# Patient Record
Sex: FEMALE | Race: BLACK OR AFRICAN AMERICAN | NOT HISPANIC OR LATINO | Employment: FULL TIME | URBAN - METROPOLITAN AREA
[De-identification: names, ages, dates, MRNs, and addresses within clinical notes are randomized per-mention and may not be internally consistent; named-entity substitution may affect disease eponyms.]

---

## 2017-06-27 PROBLEM — O99.282 THYROID DISEASE DURING PREGNANCY IN SECOND TRIMESTER: Chronic | Status: ACTIVE | Noted: 2017-06-27

## 2017-06-27 PROBLEM — E07.9 THYROID DISEASE DURING PREGNANCY IN SECOND TRIMESTER: Chronic | Status: ACTIVE | Noted: 2017-06-27
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
## 2017-08-22 ENCOUNTER — ANESTHESIA EVENT (OUTPATIENT)
Dept: OBSTETRICS AND GYNECOLOGY | Facility: OTHER | Age: 22
End: 2017-08-22
Payer: MEDICAID

## 2017-08-22 ENCOUNTER — ANESTHESIA (OUTPATIENT)
Dept: OBSTETRICS AND GYNECOLOGY | Facility: OTHER | Age: 22
End: 2017-08-22

## 2017-08-22 ENCOUNTER — HOSPITAL ENCOUNTER (INPATIENT)
Facility: OTHER | Age: 22
LOS: 22 days | Discharge: HOME | End: 2017-09-13
Attending: OBSTETRICS & GYNECOLOGY | Admitting: OBSTETRICS & GYNECOLOGY
Payer: MEDICAID

## 2017-08-22 ENCOUNTER — ANESTHESIA (OUTPATIENT)
Dept: OBSTETRICS AND GYNECOLOGY | Facility: OTHER | Age: 22
End: 2017-08-22
Payer: MEDICAID

## 2017-08-22 ENCOUNTER — ANESTHESIA EVENT (OUTPATIENT)
Dept: OBSTETRICS AND GYNECOLOGY | Facility: OTHER | Age: 22
End: 2017-08-22

## 2017-08-22 DIAGNOSIS — Z3A.26 26 WEEKS GESTATION OF PREGNANCY: ICD-10-CM

## 2017-08-22 DIAGNOSIS — O36.5990 PREGNANCY AFFECTED BY FETAL GROWTH RESTRICTION: ICD-10-CM

## 2017-08-22 DIAGNOSIS — O14.12: ICD-10-CM

## 2017-08-22 DIAGNOSIS — R00.0 TACHYCARDIA: ICD-10-CM

## 2017-08-22 DIAGNOSIS — Z3A.25 25 WEEKS GESTATION OF PREGNANCY: ICD-10-CM

## 2017-08-22 DIAGNOSIS — O36.5920: ICD-10-CM

## 2017-08-22 DIAGNOSIS — J45.20 MILD INTERMITTENT ASTHMA WITHOUT COMPLICATION: ICD-10-CM

## 2017-08-22 DIAGNOSIS — O36.5920 INTRAUTERINE GROWTH RESTRICTION AFFECTING ANTEPARTUM CARE OF MOTHER IN SECOND TRIMESTER, NOT APPLICABLE OR UNSPECIFIED FETUS: ICD-10-CM

## 2017-08-22 DIAGNOSIS — O14.10 PRE-ECLAMPSIA, SEVERE, ANTEPARTUM: ICD-10-CM

## 2017-08-22 DIAGNOSIS — O14.12 SEVERE PREECLAMPSIA, SECOND TRIMESTER: Primary | ICD-10-CM

## 2017-08-22 DIAGNOSIS — O14.12 PREECLAMPSIA, SEVERE, SECOND TRIMESTER: ICD-10-CM

## 2017-08-22 DIAGNOSIS — O14.10 PREECLAMPSIA, SEVERE: ICD-10-CM

## 2017-08-22 PROBLEM — O99.282 THYROID DISEASE DURING PREGNANCY IN SECOND TRIMESTER: Chronic | Status: RESOLVED | Noted: 2017-06-27 | Resolved: 2017-08-22

## 2017-08-22 PROBLEM — E05.90 HYPERTHYROIDISM: Status: ACTIVE | Noted: 2017-08-22

## 2017-08-22 PROBLEM — O14.90 PREECLAMPSIA: Status: ACTIVE | Noted: 2017-08-22

## 2017-08-22 PROBLEM — Z86.19 HISTORY OF HEPATITIS C: Status: ACTIVE | Noted: 2017-08-22

## 2017-08-22 PROBLEM — Z86.19 HX OF CHLAMYDIA INFECTION: Status: ACTIVE | Noted: 2017-08-22

## 2017-08-22 PROBLEM — O14.90 PREECLAMPSIA: Status: RESOLVED | Noted: 2017-08-22 | Resolved: 2017-08-22

## 2017-08-22 PROBLEM — E07.9 THYROID DISEASE DURING PREGNANCY IN SECOND TRIMESTER: Chronic | Status: RESOLVED | Noted: 2017-06-27 | Resolved: 2017-08-22

## 2017-08-22 LAB
ABO + RH BLD: NORMAL
ALBUMIN SERPL BCP-MCNC: 3.2 G/DL
ALP SERPL-CCNC: 136 U/L
ALT SERPL W/O P-5'-P-CCNC: 24 U/L
AMPHET+METHAMPHET UR QL: NEGATIVE
AST SERPL-CCNC: 25 U/L
AST SERPL-CCNC: 25 U/L
BACTERIA #/AREA URNS HPF: ABNORMAL /HPF
BARBITURATES UR QL SCN>200 NG/ML: NEGATIVE
BASOPHILS # BLD AUTO: 0.01 K/UL
BASOPHILS NFR BLD: 0 %
BENZODIAZ UR QL SCN>200 NG/ML: NEGATIVE
BILIRUB DIRECT SERPL-MCNC: 0.1 MG/DL
BILIRUB SERPL-MCNC: 0.2 MG/DL
BILIRUB UR QL STRIP: NEGATIVE
BLD GP AB SCN CELLS X3 SERPL QL: NORMAL
BZE UR QL SCN: NEGATIVE
CANNABINOIDS UR QL SCN: NEGATIVE
CLARITY UR: CLEAR
COLOR UR: YELLOW
CREAT SERPL-MCNC: 0.6 MG/DL
CREAT UR-MCNC: 27.6 MG/DL
DIFFERENTIAL METHOD: ABNORMAL
EOSINOPHIL # BLD AUTO: 0 K/UL
EOSINOPHIL NFR BLD: 0 %
ERYTHROCYTE [DISTWIDTH] IN BLOOD BY AUTOMATED COUNT: 12.8 %
EST. GFR  (AFRICAN AMERICAN): >60 ML/MIN/1.73 M^2
EST. GFR  (NON AFRICAN AMERICAN): >60 ML/MIN/1.73 M^2
ETHANOL UR-MCNC: <10 MG/DL
GLUCOSE UR QL STRIP: NEGATIVE
HCT VFR BLD AUTO: 38.3 %
HGB BLD-MCNC: 13.1 G/DL
HGB UR QL STRIP: ABNORMAL
HIV1+2 IGG SERPL QL IA.RAPID: NEGATIVE
HYALINE CASTS #/AREA URNS LPF: 0 /LPF
KETONES UR QL STRIP: ABNORMAL
LEUKOCYTE ESTERASE UR QL STRIP: NEGATIVE
LYMPHOCYTES # BLD AUTO: 0.8 K/UL
LYMPHOCYTES NFR BLD: 3.5 %
MCH RBC QN AUTO: 28.4 PG
MCHC RBC AUTO-ENTMCNC: 34.2 G/DL
MCV RBC AUTO: 83 FL
METHADONE UR QL SCN>300 NG/ML: NEGATIVE
MICROSCOPIC COMMENT: ABNORMAL
MONOCYTES # BLD AUTO: 0.1 K/UL
MONOCYTES NFR BLD: 0.5 %
NEUTROPHILS # BLD AUTO: 21.8 K/UL
NEUTROPHILS NFR BLD: 95.6 %
NITRITE UR QL STRIP: NEGATIVE
OPIATES UR QL SCN: NEGATIVE
PCP UR QL SCN>25 NG/ML: NEGATIVE
PH UR STRIP: 6 [PH] (ref 5–8)
PLATELET # BLD AUTO: 322 K/UL
PMV BLD AUTO: 11.1 FL
PROT SERPL-MCNC: 7.6 G/DL
PROT UR QL STRIP: ABNORMAL
RBC # BLD AUTO: 4.61 M/UL
RBC #/AREA URNS HPF: 20 /HPF (ref 0–4)
SP GR UR STRIP: >=1.03 (ref 1–1.03)
T4 FREE SERPL-MCNC: 1.07 NG/DL
TOXICOLOGY INFORMATION: NORMAL
TSH SERPL DL<=0.005 MIU/L-ACNC: 0.1 UIU/ML
URN SPEC COLLECT METH UR: ABNORMAL
UROBILINOGEN UR STRIP-ACNC: NEGATIVE EU/DL
WBC # BLD AUTO: 22.82 K/UL
WBC #/AREA URNS HPF: 10 /HPF (ref 0–5)

## 2017-08-22 PROCEDURE — 87081 CULTURE SCREEN ONLY: CPT

## 2017-08-22 PROCEDURE — 36415 COLL VENOUS BLD VENIPUNCTURE: CPT

## 2017-08-22 PROCEDURE — 80307 DRUG TEST PRSMV CHEM ANLYZR: CPT

## 2017-08-22 PROCEDURE — 86850 RBC ANTIBODY SCREEN: CPT | Mod: 91

## 2017-08-22 PROCEDURE — 86900 BLOOD TYPING SEROLOGIC ABO: CPT | Mod: 91

## 2017-08-22 PROCEDURE — 99221 1ST HOSP IP/OBS SF/LOW 40: CPT | Mod: ,,, | Performed by: OBSTETRICS & GYNECOLOGY

## 2017-08-22 PROCEDURE — 85025 COMPLETE CBC W/AUTO DIFF WBC: CPT | Mod: 91

## 2017-08-22 PROCEDURE — 25000003 PHARM REV CODE 250: Performed by: OBSTETRICS & GYNECOLOGY

## 2017-08-22 PROCEDURE — 86703 HIV-1/HIV-2 1 RESULT ANTBDY: CPT

## 2017-08-22 PROCEDURE — 99285 EMERGENCY DEPT VISIT HI MDM: CPT

## 2017-08-22 PROCEDURE — 11000001 HC ACUTE MED/SURG PRIVATE ROOM

## 2017-08-22 PROCEDURE — 83520 IMMUNOASSAY QUANT NOS NONAB: CPT

## 2017-08-22 PROCEDURE — 86778 TOXOPLASMA ANTIBODY IGM: CPT

## 2017-08-22 PROCEDURE — 84443 ASSAY THYROID STIM HORMONE: CPT

## 2017-08-22 PROCEDURE — 87591 N.GONORRHOEAE DNA AMP PROB: CPT

## 2017-08-22 PROCEDURE — 25000003 PHARM REV CODE 250: Performed by: STUDENT IN AN ORGANIZED HEALTH CARE EDUCATION/TRAINING PROGRAM

## 2017-08-22 PROCEDURE — 87522 HEPATITIS C REVRS TRNSCRPJ: CPT

## 2017-08-22 PROCEDURE — 80076 HEPATIC FUNCTION PANEL: CPT

## 2017-08-22 PROCEDURE — 82565 ASSAY OF CREATININE: CPT

## 2017-08-22 PROCEDURE — 86777 TOXOPLASMA ANTIBODY: CPT

## 2017-08-22 PROCEDURE — 99284 EMERGENCY DEPT VISIT MOD MDM: CPT | Mod: ,,, | Performed by: OBSTETRICS & GYNECOLOGY

## 2017-08-22 PROCEDURE — 84439 ASSAY OF FREE THYROXINE: CPT

## 2017-08-22 PROCEDURE — 86592 SYPHILIS TEST NON-TREP QUAL: CPT

## 2017-08-22 PROCEDURE — 86803 HEPATITIS C AB TEST: CPT

## 2017-08-22 PROCEDURE — 84445 ASSAY OF TSI GLOBULIN: CPT

## 2017-08-22 PROCEDURE — 81000 URINALYSIS NONAUTO W/SCOPE: CPT

## 2017-08-22 PROCEDURE — 63600175 PHARM REV CODE 636 W HCPCS: Performed by: OBSTETRICS & GYNECOLOGY

## 2017-08-22 PROCEDURE — 86703 HIV-1/HIV-2 1 RESULT ANTBDY: CPT | Mod: 91

## 2017-08-22 RX ORDER — MAGNESIUM SULFATE HEPTAHYDRATE 40 MG/ML
2 INJECTION, SOLUTION INTRAVENOUS CONTINUOUS
Status: DISCONTINUED | OUTPATIENT
Start: 2017-08-22 | End: 2017-08-23

## 2017-08-22 RX ORDER — NIFEDIPINE 10 MG/1
10 CAPSULE ORAL ONCE
Status: COMPLETED | OUTPATIENT
Start: 2017-08-22 | End: 2017-08-22

## 2017-08-22 RX ORDER — ONDANSETRON 8 MG/1
8 TABLET, ORALLY DISINTEGRATING ORAL EVERY 8 HOURS PRN
Status: DISCONTINUED | OUTPATIENT
Start: 2017-08-22 | End: 2017-09-09

## 2017-08-22 RX ORDER — BETAMETHASONE SODIUM PHOSPHATE AND BETAMETHASONE ACETATE 3; 3 MG/ML; MG/ML
12 INJECTION, SUSPENSION INTRA-ARTICULAR; INTRALESIONAL; INTRAMUSCULAR; SOFT TISSUE ONCE
Status: COMPLETED | OUTPATIENT
Start: 2017-08-23 | End: 2017-08-23

## 2017-08-22 RX ORDER — BETAMETHASONE SODIUM PHOSPHATE AND BETAMETHASONE ACETATE 3; 3 MG/ML; MG/ML
12 INJECTION, SUSPENSION INTRA-ARTICULAR; INTRALESIONAL; INTRAMUSCULAR; SOFT TISSUE ONCE
Status: DISCONTINUED | OUTPATIENT
Start: 2017-08-23 | End: 2017-08-22

## 2017-08-22 RX ORDER — NIFEDIPINE 30 MG/1
30 TABLET, EXTENDED RELEASE ORAL DAILY
Status: DISCONTINUED | OUTPATIENT
Start: 2017-08-23 | End: 2017-08-22

## 2017-08-22 RX ORDER — NIFEDIPINE 30 MG/1
30 TABLET, EXTENDED RELEASE ORAL DAILY
Status: DISCONTINUED | OUTPATIENT
Start: 2017-08-23 | End: 2017-08-26

## 2017-08-22 RX ORDER — NIFEDIPINE 30 MG/1
60 TABLET, EXTENDED RELEASE ORAL DAILY
Status: DISCONTINUED | OUTPATIENT
Start: 2017-08-23 | End: 2017-08-22

## 2017-08-22 RX ORDER — CALCIUM GLUCONATE 98 MG/ML
1 INJECTION, SOLUTION INTRAVENOUS
Status: DISCONTINUED | OUTPATIENT
Start: 2017-08-22 | End: 2017-08-23

## 2017-08-22 RX ORDER — SIMETHICONE 80 MG
1 TABLET,CHEWABLE ORAL EVERY 6 HOURS PRN
Status: DISCONTINUED | OUTPATIENT
Start: 2017-08-22 | End: 2017-09-10 | Stop reason: SDUPTHER

## 2017-08-22 RX ORDER — DIPHENHYDRAMINE HYDROCHLORIDE 50 MG/ML
25 INJECTION INTRAMUSCULAR; INTRAVENOUS EVERY 4 HOURS PRN
Status: DISCONTINUED | OUTPATIENT
Start: 2017-08-22 | End: 2017-09-02

## 2017-08-22 RX ORDER — SODIUM CHLORIDE, SODIUM LACTATE, POTASSIUM CHLORIDE, CALCIUM CHLORIDE 600; 310; 30; 20 MG/100ML; MG/100ML; MG/100ML; MG/100ML
1000 INJECTION, SOLUTION INTRAVENOUS CONTINUOUS
Status: DISCONTINUED | OUTPATIENT
Start: 2017-08-22 | End: 2017-08-23

## 2017-08-22 RX ORDER — DIPHENHYDRAMINE HCL 25 MG
25 CAPSULE ORAL EVERY 4 HOURS PRN
Status: DISCONTINUED | OUTPATIENT
Start: 2017-08-22 | End: 2017-09-11

## 2017-08-22 RX ORDER — PRENATAL WITH FERROUS FUM AND FOLIC ACID 3080; 920; 120; 400; 22; 1.84; 3; 20; 10; 1; 12; 200; 27; 25; 2 [IU]/1; [IU]/1; MG/1; [IU]/1; MG/1; MG/1; MG/1; MG/1; MG/1; MG/1; UG/1; MG/1; MG/1; MG/1; MG/1
1 TABLET ORAL DAILY
Status: DISCONTINUED | OUTPATIENT
Start: 2017-08-23 | End: 2017-09-11

## 2017-08-22 RX ORDER — NIFEDIPINE 30 MG/1
30 TABLET, EXTENDED RELEASE ORAL DAILY
Status: DISCONTINUED | OUTPATIENT
Start: 2017-08-22 | End: 2017-08-22

## 2017-08-22 RX ADMIN — NIFEDIPINE 10 MG: 10 CAPSULE, LIQUID FILLED ORAL at 09:08

## 2017-08-22 RX ADMIN — NIFEDIPINE 30 MG: 30 TABLET, FILM COATED, EXTENDED RELEASE ORAL at 05:08

## 2017-08-22 RX ADMIN — MAGNESIUM SULFATE IN WATER 2 G/HR: 40 INJECTION, SOLUTION INTRAVENOUS at 05:08

## 2017-08-22 RX ADMIN — NIFEDIPINE 10 MG: 10 CAPSULE, LIQUID FILLED ORAL at 08:08

## 2017-08-22 RX ADMIN — SODIUM CHLORIDE, POTASSIUM CHLORIDE, SODIUM LACTATE AND CALCIUM CHLORIDE 1000 ML: 600; 310; 30; 20 INJECTION, SOLUTION INTRAVENOUS at 05:08

## 2017-08-22 NOTE — PROGRESS NOTES
H and P pending.  In brief, I saw this patient in the OB ED who has new onset preeclampsia along with IUGR, fetal echogenic bowel and possible VSD.  The patient was started on magnesium sulfate and given a dose of IV hydralazine and betamethasone and transferred. P/C ratio was positive.  LFTs, creatinine, and platelets were normal.  EFW 379g.  I discussed with the patient the poor prognosis for the fetus at this gestational age given the EFW. The patient is aware that it is likely if this EFW is true that intubation of the fetus may not be possible. I discussed with her before resuscitation would even be considered I would recommend at least having the steroids on board before considering something such as a csection for distress.  I reviewed with her that a classical csection has consequences for future pregnancies and that a csection would always be needed in future pregnancies and discussed the risks of abnormal placentation.  The patient reports one prior SAB.  Maternal and fetal risks of morbidity and mortality with preeclampsia were reviewed. We will start Procardia XL 30mg daily in an event to temporize the patient's blood pressure to try to allow the fetus to get bigger. The patient is aware of the potential risks of conservative management and desires this but is also aware that delivery may be recommended at anytime. Recheck labs this evening. Continue magnesium.  With AEDF and IUGR, risks of IUFD and  demise were reviewed.  Check thyroid labs and hepatitis labs and HIV and tox screen.  Echogenic bowel labs are pending.  Please also see Dr. Trejo's note.  I spoke to Dr. Hartmann who indicated one of their team will see the patient tomorrow unless delivery is needed tonight for some reason then she will see the patient prior to delivery.  Patient agrees to doptones bid and no fetal monitoring currently.  If patient with low glucose IVF may need altered.  Patient with asthma-chose procardia XL  instead-reviewed theoretical risk of hypotension and magnesium.  Monitor pulse-ekg if signifcant tachycardia persists. BP 150s over 80s when I was in the room-had one 160 in room prior to my arrival.

## 2017-08-22 NOTE — ASSESSMENT & PLAN NOTE
Pre-Eclampsia w/ Severe Features (BP) at 25w0d complicated by FGR with AEDF  - Admit to Antepartum unit  - Consents signed for Delivery and Blood   - Pre-E Labs - P/C: 5.44, AST: 21, Plt:240, Cr:0.5  - Continue MgSO4 for seizure prophylaxis - s/p 4g load, continue at 2g per hour. Will closely monitor for UOP and for signs of MgSO4 toxicity.   - BPs - s/p hydralazine 5mg IV at OSH  - BP: (142-173)/() 155/83 . Will continue to monitor BPs.   - Continue BMZ series  - Repeat labs at 2000

## 2017-08-22 NOTE — HOSPITAL COURSE
08/22/2017 - admitted to antepartum at 25w0d for newly diagnosed PreE w/SF in a pregnancy complicated by FGR (AEDF). BMZ series and Mag started at OSH. BP remained elevated, started on Procardia 30XL.  08/23/2017 - Doing well from PreE standpoint, asymptomatic, labs stable, BP now mild range.  08/24/2017 -8/27 - Continues to be stable from PrE standpoint. Platelets stable, AST stable. Continue FHR dopplers q shift.  HD7-14: BP regimen changed to Procardia XL 60AM/30PM due to elevated PM BP. Labs stable. Umbilical artery flow remains absent on doppler. Ob glucose screen 169. Will order 3hour GTT this week.   HD15-16: Growth scan 420 g, <1%ile. Continue NST BID. Rescue course of BMZ completed.  HD17: BP to 150 overnight, Procardia given early.  HD18: BP in mild range intermittently overnight.  No acute issues.   HD19: NST with new onset variable decelerations. REDF on U/S. CCS 2/2 REDF/NRFHT - uncomplicated.   HD 20-22: POD #2-4 s/p LTCS. Patient doing well with no complaints. Denies SF. Procardia decreased to 30 XL daily with BP remaining in normal to mild range. Patient stable for discharge on HD#22/POD#4.

## 2017-08-22 NOTE — ED PROVIDER NOTES
Encounter Date: 2017       History     Chief Complaint   Patient presents with    Hypertension     Myrtle Valdez is a 22 y.o. [id number] at 25w0d [place] a [place] secondary to PreE w/SF and FGR with AEDF.     This IUP is complicated by newly diagnosed PreE w/SF (severe range BP), fetal growth restriction (1%, AEDF, fetal hyperechoic bowel and possible VSD), asthma (well controlled, no meds), hx hyperthyroidism (no meds), questionable hx of HepC, hx of chlamydia.     Currently patient has no complaints. Patient denies headache, scotoma, RUQ pain, N/V, CP, SOB. Patient denies contractions, vaginal bleeding or discharge, denies LOF.   Fetal Movement: normal.     Pt was [place] by Dr. Trejo and accepted by Dr. Mcdermott here.  Prenatal care with Dr. Elier Sanchez there.           Review of patient's allergies indicates:  No Known Allergies  Past Medical History:   Diagnosis Date    Asthma     Hypertension     elevated BP 17    Thyroid disease     hyperthyroid     Past Surgical History:   Procedure Laterality Date    DILATION AND CURETTAGE OF UTERUS      HIP SURGERY      TYMPANOPLASTY Left 6/10/2014    TYMPANOSTOMY TUBE PLACEMENT       Family History   Problem Relation Age of Onset    Diabetes Mother     Hypertension Mother     Cancer Maternal Grandmother     Diabetes Maternal Grandmother     Heart disease Maternal Grandmother     Hypertension Maternal Grandmother     Asthma Maternal Grandfather     Diabetes Maternal Grandfather      Social History   Substance Use Topics    Smoking status: Never Smoker    Smokeless tobacco: Never Used    Alcohol use No     Review of Systems   Constitutional: Negative for appetite change, chills and fever.   HENT: Negative for congestion and sore throat.    Respiratory: Negative for chest tightness, shortness of breath and wheezing.    Cardiovascular: Negative for chest pain and leg swelling.    Gastrointestinal: Negative for abdominal pain, constipation, diarrhea, nausea and vomiting.   Genitourinary: Negative for dysuria, frequency, vaginal bleeding, vaginal discharge and vaginal pain.   Musculoskeletal: Negative for back pain.   Neurological: Negative for dizziness and headaches.   Psychiatric/Behavioral: Negative for agitation, behavioral problems and confusion.       Physical Exam     Initial Vitals [08/22/17 1615]   BP Pulse Resp Temp SpO2   (!) 157/89 100 -- 98 °F (36.7 °C) 100 %      MAP       111.67         Physical Exam    Vitals reviewed.  Constitutional: She appears well-developed and well-nourished. No distress.   HENT:   Head: Normocephalic and atraumatic.   Eyes: EOM are normal.   Neck: Normal range of motion. Neck supple.   Cardiovascular: Normal rate and regular rhythm.   Pulmonary/Chest: No respiratory distress.   Abdominal: Soft. There is no tenderness. There is no rebound and no guarding.   Gravid fundus soft and nontender, appropriate for gestational age   Genitourinary:   Genitourinary Comments: deferred   Musculoskeletal: Normal range of motion. She exhibits no edema or tenderness.   Neurological: She is alert and oriented to person, place, and time. She has normal reflexes. No cranial nerve deficit.   Skin: Skin is warm and dry. No rash and no abscess noted. No erythema. No pallor.   Psychiatric: She has a normal mood and affect. Her behavior is normal. Judgment and thought content normal.     OB LABOR EXAM:       Method: Sterile vaginal exam per MD.   Vaginal Bleeding: none present.     Dilatation: 0.   Station: -4.   Effacement: 40%.   Amniotic Fluid Color: no fluid.     Comments: FHT: verified at 140s       ED Course   Procedures  Labs Reviewed   STREP B SCREEN, VAGINAL / RECTAL   C. TRACHOMATIS/N. GONORRHOEAE BY AMP DNA   AST (SGOT)   CBC W/ AUTO DIFFERENTIAL   HEPATITIS C RNA, QUANTITATIVE, PCR   CREATININE, SERUM   HEPATIC FUNCTION PANEL   HEPATITIS C ANTIBODY   HIV 1 / 2  ANTIBODY   RAPID HIV   RPR   T4, FREE   THYROID STIMULATING IMMUNOGLOBULIN   THYROTROPIN RECEPTOR ANTIBODY   TOXICOLOGY SCREEN, URINE, RANDOM (COMPLIANCE)   TOXOPLASMA GONDII ANTIBODY, IGM   TOXOPLASMA GONDII IGG   TSH   TYPE & SCREEN                          Attending Attestation:   Physician Attestation Statement for Resident:  As the supervising MD   Physician Attestation Statement: I have personally seen and examined this patient.   I agree with the above history. -:   As the supervising MD I agree with the above PE.    As the supervising MD I agree with the above treatment, course, plan, and disposition.   -: Patient evaluated and found to be stable, agree with resident's assessment and plan for admission to antepartum under MFM direction.   I was personally present during the critical portions of the procedure(s) performed by the resident and was immediately available in the ED to provide services and assistance as needed during the entire procedure.  I have reviewed and agree with the residents interpretation of the following: lab data.  I have reviewed the following: old records at this facility and records from a referring facility.                    ED Course   Comment By Time    by Doppler  Ms. Valdez is a 22 year old female  with prenatal care at [facility], transferred at 25 weeks gestation by Dr. Trejo today with severe preeclampsia, fetal growth restriction and absent end diastolic flow.  Pt is to be evaluated for admission either for delivery or to the antepartum floor. Pt denies headaches and denies any pain. Pt is just scared and crying. Reassured the pt that [facility] will be supervising her care throughout and that we are all participating to ensure the best care possible. Pt satisfied and senior resident present to proceed with consents for admission/delivery and initiating plan of care.  Minal Marroquin MD  [phone]     Clinical Impression:   The primary encounter diagnosis  was 25 weeks gestation of pregnancy. Diagnoses of Preeclampsia, severe and Pregnancy affected by fetal growth restriction were also pertinent to this visit.    Disposition:   Disposition: Admitted  Condition: Stable  Admit to high risk ante for BMZ, Mag, close monitoring and NICU/MFM consults           Kaity Nascimento M.D.  PGY-4 OB/GYN         Kaity Nascimento MD  Resident  08/22/17 0901       Minal Marroquin MD  08/22/17 [id number]

## 2017-08-22 NOTE — HPI
Myrtle Valdez is a 22 y.o. [id number] at 25w0d presents as a transport from [facility] secondary to PreE w/SF and FGR with AEDF.    This IUP is complicated by newly diagnosed PreE w/SF (severe range BP), fetal growth restriction (1%, AEDF, fetal hyperechoic bowel and possible VSD), asthma (well controlled, no meds), hx hyperthyroidism (no meds), questionable hx of HepC, hx of chlamydia.    Currently patient has no complaints. Patient denies headache, scotoma, RUQ pain, N/V, CP, SOB. Patient denies contractions, vaginal bleeding or discharge, denies LOF.   Fetal Movement: normal.

## 2017-08-22 NOTE — H&P
[facility]  Obstetrics  History & Physical    Patient Name: Myrtle Valdez  MRN: [id number]  Admission Date: 2017  Primary Care Provider: Primary Doctor No    Subjective:     Principal Problem:Pre-eclampsia, severe, antepartum    History of Present Illness:  Myrtle Valdez is a 22 y.o. [id number] at 25w0d presents as a transport from [facility] secondary to PreE w/SF and FGR with AEDF.    This IUP is complicated by newly diagnosed PreE w/SF (severe range BP), fetal growth restriction (1%, AEDF, fetal hyperechoic bowel and possible VSD), asthma (well controlled, no meds), hx hyperthyroidism (no meds), questionable hx of HepC, hx of chlamydia.    Currently patient has no complaints. Patient denies headache, scotoma, RUQ pain, N/V, CP, SOB. Patient denies contractions, vaginal bleeding or discharge, denies LOF.   Fetal Movement: normal.       Obstetric History       T0      L0     SAB0   TAB0   Ectopic0   Multiple0   Live Births0       # Outcome Date GA Lbr Jos/2nd Weight Sex Delivery Anes PTL Lv   2 Current            1 AB  12w0d               Past Medical History:   Diagnosis Date    Asthma     Hypertension     elevated BP 17    Thyroid disease     hyperthyroid     Past Surgical History:   Procedure Laterality Date    DILATION AND CURETTAGE OF UTERUS      HIP SURGERY      TYMPANOPLASTY Left 6/10/2014    TYMPANOSTOMY TUBE PLACEMENT         PTA Medications   Medication Sig    neomycin-polymyxin-hydrocortisone (CORTISPORIN) 3.5-10,000-1 mg/mL-unit/mL-% otic suspension Place 4 drops into the left ear 3 (three) times daily.    PRENATAL VIT CALC,IRON,FOLIC (PRENATAL VITAMIN ORAL) Take by mouth.    desonide 0.05% (DESOWEN) 0.05 % Oint Apply topically 2 (two) times daily. May use on rash 24 hours after washing off elimite. Use for 2 weeks       Review of patient's allergies indicates:  No Known Allergies     Family History     Problem Relation (Age of Onset)     Asthma Maternal Grandfather    Cancer Maternal Grandmother    Diabetes Mother, Maternal Grandmother, Maternal Grandfather    Heart disease Maternal Grandmother    Hypertension Mother, Maternal Grandmother        Social History Main Topics    Smoking status: Never Smoker    Smokeless tobacco: Never Used    Alcohol use No    Drug use: No    Sexual activity: No     Review of Systems   Constitutional: Negative for activity change, appetite change, fatigue and fever.   Respiratory: Negative for cough and shortness of breath.    Cardiovascular: Negative for chest pain and palpitations.   Gastrointestinal: Negative for abdominal pain, constipation, diarrhea, nausea and vomiting.   Genitourinary: Negative for dysuria, frequency, pelvic pain, vaginal bleeding and vaginal discharge.   Neurological: Negative for headaches.   Psychiatric/Behavioral: Negative for depression. The patient is not nervous/anxious.    Breast: Negative for breast mass and breast pain     Objective:     Vital Signs (Most Recent):  Temp: 98 °F (36.7 °C) (08/22/17 1615)  Pulse: 97 (08/22/17 1630)  BP: (!) 155/83 (08/22/17 1630)  SpO2: 100 % (08/22/17 1630) Vital Signs (24h Range):  Temp:  [97 °F (36.1 °C)-98 °F (36.7 °C)] 98 °F (36.7 °C)  Pulse:  [] 97  Resp:  [16] 16  SpO2:  [100 %] 100 %  BP: (142-173)/() 155/83      FHT: verified at 140s    Physical Exam:   Constitutional: She is oriented to person, place, and time. She appears well-developed and well-nourished.    HENT:   Head: Normocephalic and atraumatic.     Neck: Normal range of motion.    Cardiovascular: Normal rate, regular rhythm and normal heart sounds.     Pulmonary/Chest: Effort normal and breath sounds normal. No respiratory distress. She has no wheezes.        Abdominal: Soft. Bowel sounds are normal. She exhibits no distension and no abdominal incision. There is no tenderness.   Gravid 25w     Genitourinary:   Genitourinary Comments: Normal external genitalia. Cervix  appears normal. White discharge present. Cultures collected.           Musculoskeletal: Normal range of motion and moves all extremeties. She exhibits no edema or tenderness.       Neurological: She is alert and oriented to person, place, and time. She has normal reflexes.    Skin: Skin is warm and dry.    Psychiatric: She has a normal mood and affect.       Cervix:  Dilation:  0  Effacement:  0%  Station: -3  Presentation: Vertex     Significant Labs:  Lab Results   Component Value Date    GROUPTRH B POS 2017       Chemistries:     Recent Labs  Lab 17  1151      K 3.8      CO2 21*   BUN 7   CREATININE 0.50*   CALCIUM 9.2   PROT 6.4   BILITOT 0.4   ALKPHOS 90   ALT 33   AST 21        CBC/Anemia Labs:     Recent Labs  Lab 17  1151   WBC 17.90*   HGB 11.7*   HCT 35.8*      MCV 85   RDW 13.3            Assessment/Plan:     22 y.o. female  at 25w0d for:    * Pre-eclampsia, severe, antepartum    Pre-Eclampsia w/ Severe Features (BP) at 25w0d complicated by FGR with AEDF  - Admit to Antepartum unit  - Consents signed for Delivery and Blood   - Pre-E Labs - P/C: 5.44, AST: 21, Plt:240, Cr:0.5  - Continue MgSO4 for seizure prophylaxis - s/p 4g load, continue at 2g per hour. Will closely monitor for UOP and for signs of MgSO4 toxicity.   - BPs - s/p hydralazine 5mg IV at OSH  - BP: (142-173)/() 155/83 . Will continue to monitor BPs.   - Continue BMZ series  - Repeat labs at         Intrauterine growth restriction affecting antepartum care of mother in second trimester    - AEDF, EFW 379g, 1% on   - Fetus also has hyperechoic fetal bowel and possible VSD  - Work Up: CMV, parvo, toxoplasmosis, Urine tox  - Verify FHT daily        25 weeks gestation of pregnancy    - PNV  - verify FHT daily        History of hepatitis C    - Hep C Ab and viral load ordered  - hepatic function panel        Hyperthyroidism    - no meds  - TSH, FT4, thyroid antibodies ordered        Hx of  chlamydia infection    - Gc/CT collected        Asthma    - well controlled, no meds  - continue to monitor            Kaity Nascimento MD  Obstetrics  [facility]

## 2017-08-22 NOTE — PROGRESS NOTES
H and P pending.  In brief, I saw this patient in the OB ED who has new onset preeclampsia along with IUGR, fetal echogenic bowel and possible VSD.  The patient was started on magnesium sulfate and given a dose of IV hydralazine and betamethasone and transferred. P/C ratio was positive.  LFTs, creatinine, and platelets were normal.  EFW 379g.  I discussed with the patient the poor prognosis for the fetus at this gestational age given the EFW. The patient is aware that it is likely if this EFW is true that intubation of the fetus may not be possible. I discussed with her before resuscitation would even be considered I would recommend at least having the steroids on board before considering something such as a csection for distress.  I reviewed with her that a classical csection has consequences for future pregnancies and that a csection would always be needed in future pregnancies and discussed the risks of abnormal placentation.  The patient reports one prior SAB.  Maternal and fetal risks of morbidity and mortality with preeclampsia were reviewed. We will start Procardia XL 30mg daily in an event to temporize the patient's blood pressure to try to allow the fetus to get bigger. The patient is aware of the potential risks of conservative management and desires this but is also aware that delivery may be recommended at anytime. Recheck labs this evening. Continue magnesium.  With AEDF and IUGR, risks of IUFD and  demise were reviewed.  Check thyroid labs and hepatitis labs and HIV and tox screen.  Echogenic bowel labs are pending.  Please also see Dr. Trejo's note.  I spoke to Dr. Hartmann who indicated one of their team will see the patient tomorrow unless delivery is needed tonight for some reason then she will see the patient prior to delivery.  Patient agrees to doptones bid and no fetal monitoring currently.  If patient with low glucose IVF may need altered.
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
## 2017-08-22 NOTE — SUBJECTIVE & OBJECTIVE
Obstetric History       T0      L0     SAB0   TAB0   Ectopic0   Multiple0   Live Births0       # Outcome Date GA Lbr Jos/2nd Weight Sex Delivery Anes PTL Lv   2 Current            1 AB  12w0d               Past Medical History:   Diagnosis Date    Asthma     Hypertension     elevated BP 17    Thyroid disease     hyperthyroid     Past Surgical History:   Procedure Laterality Date    DILATION AND CURETTAGE OF UTERUS      HIP SURGERY      TYMPANOPLASTY Left 6/10/2014    TYMPANOSTOMY TUBE PLACEMENT         PTA Medications   Medication Sig    neomycin-polymyxin-hydrocortisone (CORTISPORIN) 3.5-10,000-1 mg/mL-unit/mL-% otic suspension Place 4 drops into the left ear 3 (three) times daily.    PRENATAL VIT CALC,IRON,FOLIC (PRENATAL VITAMIN ORAL) Take by mouth.    desonide 0.05% (DESOWEN) 0.05 % Oint Apply topically 2 (two) times daily. May use on rash 24 hours after washing off elimite. Use for 2 weeks       Review of patient's allergies indicates:  No Known Allergies     Family History     Problem Relation (Age of Onset)    Asthma Maternal Grandfather    Cancer Maternal Grandmother    Diabetes Mother, Maternal Grandmother, Maternal Grandfather    Heart disease Maternal Grandmother    Hypertension Mother, Maternal Grandmother        Social History Main Topics    Smoking status: Never Smoker    Smokeless tobacco: Never Used    Alcohol use No    Drug use: No    Sexual activity: No     Review of Systems   Constitutional: Negative for activity change, appetite change, fatigue and fever.   Respiratory: Negative for cough and shortness of breath.    Cardiovascular: Negative for chest pain and palpitations.   Gastrointestinal: Negative for abdominal pain, constipation, diarrhea, nausea and vomiting.   Genitourinary: Negative for dysuria, frequency, pelvic pain, vaginal bleeding and vaginal discharge.   Neurological: Negative for headaches.   Psychiatric/Behavioral: Negative for  depression. The patient is not nervous/anxious.    Breast: Negative for breast mass and breast pain     Objective:     Vital Signs (Most Recent):  Temp: 98 °F (36.7 °C) (08/22/17 1615)  Pulse: 97 (08/22/17 1630)  BP: (!) 155/83 (08/22/17 1630)  SpO2: 100 % (08/22/17 1630) Vital Signs (24h Range):  Temp:  [97 °F (36.1 °C)-98 °F (36.7 °C)] 98 °F (36.7 °C)  Pulse:  [] 97  Resp:  [16] 16  SpO2:  [100 %] 100 %  BP: (142-173)/() 155/83      FHT: verified at 140s    Physical Exam:   Constitutional: She is oriented to person, place, and time. She appears well-developed and well-nourished.    HENT:   Head: Normocephalic and atraumatic.     Neck: Normal range of motion.    Cardiovascular: Normal rate, regular rhythm and normal heart sounds.     Pulmonary/Chest: Effort normal and breath sounds normal. No respiratory distress. She has no wheezes.        Abdominal: Soft. Bowel sounds are normal. She exhibits no distension and no abdominal incision. There is no tenderness.   Gravid 25w     Genitourinary:   Genitourinary Comments: Normal external genitalia. Cervix appears normal. White discharge present. Cultures collected.           Musculoskeletal: Normal range of motion and moves all extremeties. She exhibits no edema or tenderness.       Neurological: She is alert and oriented to person, place, and time. She has normal reflexes.    Skin: Skin is warm and dry.    Psychiatric: She has a normal mood and affect.       Cervix:  Dilation:  0  Effacement:  0%  Station: -3  Presentation: Vertex     Significant Labs:  Lab Results   Component Value Date    GROUPTRH B POS 08/22/2017       Chemistries:     Recent Labs  Lab 08/22/17  1151      K 3.8      CO2 21*   BUN 7   CREATININE 0.50*   CALCIUM 9.2   PROT 6.4   BILITOT 0.4   ALKPHOS 90   ALT 33   AST 21        CBC/Anemia Labs:     Recent Labs  Lab 08/22/17  1151   WBC 17.90*   HGB 11.7*   HCT 35.8*      MCV 85   RDW 13.3

## 2017-08-22 NOTE — ASSESSMENT & PLAN NOTE
- AEDF, EFW 379g, 1% on 8/22  - Fetus also has hyperechoic fetal bowel and possible VSD  - Work Up: CMV, parvo, toxoplasmosis, Urine tox  - Verify FHT daily

## 2017-08-23 PROBLEM — R00.0 TACHYCARDIA: Status: ACTIVE | Noted: 2017-08-23

## 2017-08-23 LAB
C TRACH DNA SPEC QL NAA+PROBE: NOT DETECTED
HCV AB SERPL QL IA: NEGATIVE
HIV 1+2 AB+HIV1 P24 AG SERPL QL IA: NEGATIVE
N GONORRHOEA DNA SPEC QL NAA+PROBE: NOT DETECTED
RPR SER QL: NORMAL
T3FREE SERPL-MCNC: 3.1 PG/ML

## 2017-08-23 PROCEDURE — 99231 SBSQ HOSP IP/OBS SF/LOW 25: CPT | Mod: ,,, | Performed by: OBSTETRICS & GYNECOLOGY

## 2017-08-23 PROCEDURE — 99232 SBSQ HOSP IP/OBS MODERATE 35: CPT | Mod: ,,, | Performed by: PEDIATRICS

## 2017-08-23 PROCEDURE — 84481 FREE ASSAY (FT-3): CPT

## 2017-08-23 PROCEDURE — 76827 ECHO EXAM OF FETAL HEART: CPT | Performed by: PEDIATRICS

## 2017-08-23 PROCEDURE — 87086 URINE CULTURE/COLONY COUNT: CPT

## 2017-08-23 PROCEDURE — 87147 CULTURE TYPE IMMUNOLOGIC: CPT

## 2017-08-23 PROCEDURE — 76825 ECHO EXAM OF FETAL HEART: CPT | Performed by: PEDIATRICS

## 2017-08-23 PROCEDURE — 96372 THER/PROPH/DIAG INJ SC/IM: CPT

## 2017-08-23 PROCEDURE — 93325 DOPPLER ECHO COLOR FLOW MAPG: CPT | Performed by: PEDIATRICS

## 2017-08-23 PROCEDURE — 25000003 PHARM REV CODE 250: Performed by: OBSTETRICS & GYNECOLOGY

## 2017-08-23 PROCEDURE — 87088 URINE BACTERIA CULTURE: CPT

## 2017-08-23 PROCEDURE — 25000003 PHARM REV CODE 250: Performed by: STUDENT IN AN ORGANIZED HEALTH CARE EDUCATION/TRAINING PROGRAM

## 2017-08-23 PROCEDURE — 93010 ELECTROCARDIOGRAM REPORT: CPT | Mod: ,,, | Performed by: INTERNAL MEDICINE

## 2017-08-23 PROCEDURE — 63600175 PHARM REV CODE 636 W HCPCS: Performed by: OBSTETRICS & GYNECOLOGY

## 2017-08-23 PROCEDURE — 11000001 HC ACUTE MED/SURG PRIVATE ROOM

## 2017-08-23 PROCEDURE — 93005 ELECTROCARDIOGRAM TRACING: CPT

## 2017-08-23 PROCEDURE — 99900035 HC TECH TIME PER 15 MIN (STAT)

## 2017-08-23 PROCEDURE — 36415 COLL VENOUS BLD VENIPUNCTURE: CPT

## 2017-08-23 RX ORDER — MAGNESIUM SULFATE HEPTAHYDRATE 40 MG/ML
2 INJECTION, SOLUTION INTRAVENOUS CONTINUOUS
Status: DISCONTINUED | OUTPATIENT
Start: 2017-08-23 | End: 2017-08-23

## 2017-08-23 RX ORDER — ACETAMINOPHEN 325 MG/1
650 TABLET ORAL EVERY 6 HOURS PRN
Status: DISCONTINUED | OUTPATIENT
Start: 2017-08-23 | End: 2017-09-09

## 2017-08-23 RX ORDER — SODIUM CHLORIDE, SODIUM LACTATE, POTASSIUM CHLORIDE, CALCIUM CHLORIDE 600; 310; 30; 20 MG/100ML; MG/100ML; MG/100ML; MG/100ML
1000 INJECTION, SOLUTION INTRAVENOUS CONTINUOUS
Status: DISCONTINUED | OUTPATIENT
Start: 2017-08-23 | End: 2017-08-23

## 2017-08-23 RX ORDER — SODIUM CHLORIDE, SODIUM LACTATE, POTASSIUM CHLORIDE, CALCIUM CHLORIDE 600; 310; 30; 20 MG/100ML; MG/100ML; MG/100ML; MG/100ML
INJECTION, SOLUTION INTRAVENOUS CONTINUOUS
Status: DISCONTINUED | OUTPATIENT
Start: 2017-08-23 | End: 2017-08-23

## 2017-08-23 RX ORDER — CALCIUM GLUCONATE 98 MG/ML
1 INJECTION, SOLUTION INTRAVENOUS
Status: DISCONTINUED | OUTPATIENT
Start: 2017-08-23 | End: 2017-08-23

## 2017-08-23 RX ADMIN — ACETAMINOPHEN 650 MG: 325 TABLET ORAL at 11:08

## 2017-08-23 RX ADMIN — NIFEDIPINE 30 MG: 30 TABLET, FILM COATED, EXTENDED RELEASE ORAL at 09:08

## 2017-08-23 RX ADMIN — BETAMETHASONE SODIUM PHOSPHATE AND BETAMETHASONE ACETATE 12 MG: 3; 3 INJECTION, SUSPENSION INTRA-ARTICULAR; INTRALESIONAL; INTRAMUSCULAR at 12:08

## 2017-08-23 RX ADMIN — MAGNESIUM SULFATE IN WATER 2 G/HR: 40 INJECTION, SOLUTION INTRAVENOUS at 07:08

## 2017-08-23 RX ADMIN — PRENATAL VIT W/ FE FUMARATE-FA TAB 27-0.8 MG 1 EACH: 27-0.8 TAB at 09:08

## 2017-08-23 NOTE — MEDICAL/APP STUDENT
Magnesium Assessment Note    Subjective:         Myrtle Valedz is a 22 y.o. female at 25w0d on IV MgSO4 for seizure prophylaxis.    Patient denies headaches, denies blurry vision and denies right upper quadrant pain. Denies CP, denies SOB. Patient reports no nausea/no vomiting.     Objective:      Temp:  [97 °F (36.1 °C)-98 °F (36.7 °C)] 98 °F (36.7 °C)  Pulse:  [] 123  Resp:  [16] 16  SpO2:  [99 %-100 %] 100 %  BP: (142-188)/() 172/92    I/O last 3 completed shifts:  In: 100 [I.V.:100]  Out: 550 [Urine:550]  No intake/output data recorded.    General:   alert, cooperative and no distress   Lungs:   clear to auscultation bilaterally   Heart::   regular rate and rhythm and S1, S2 normal   Extremities: peripheral pulses normal, no pedal edema, no clubbing or cyanosis   DTRs- 2+  bilaterally     NST: reassuring, Category 1, 130 bpm, moderate BTBV  TOCO: no contractions      Lab Review  Recent Results (from the past 24 hour(s))   Type & Screen    Collection Time: 08/22/17 11:45 AM   Result Value Ref Range    Group & Rh B POS     Indirect Joao GEL NEG    CBC with Auto Differential    Collection Time: 08/22/17 11:51 AM   Result Value Ref Range    WBC 17.90 (H) 3.90 - 12.70 K/uL    RBC 4.22 4.00 - 5.40 M/uL    Hemoglobin 11.7 (L) 12.0 - 16.0 g/dL    Hematocrit 35.8 (L) 37.0 - 48.5 %    MCV 85 82 - 98 fL    MCH 27.7 27.0 - 31.0 pg    MCHC 32.7 32.0 - 36.0 g/dL    RDW 13.3 11.5 - 14.5 %    Platelets 240 150 - 350 K/uL    MPV 9.8 9.2 - 12.9 fL    Gran # 15.1 (H) 1.8 - 7.7 K/uL    Lymph # 1.5 1.0 - 4.8 K/uL    Mono # 1.0 0.3 - 1.0 K/uL    Eos # 0.2 0.0 - 0.5 K/uL    Baso # 0.10 0.00 - 0.20 K/uL    nRBC 0 0 /100 WBC    Gran% 84.6 (H) 38.0 - 73.0 %    Lymph% 8.1 (L) 18.0 - 48.0 %    Mono% 5.5 4.0 - 15.0 %    Eosinophil% 1.2 0.0 - 8.0 %    Basophil% 0.6 0.0 - 1.9 %    Differential Method Automated    Comprehensive Metabolic Panel (CMP)    Collection Time: 08/22/17 11:51 AM   Result Value Ref Range    Sodium 138  136 - 145 mmol/L    Potassium 3.8 3.5 - 5.1 mmol/L    Chloride 109 95 - 110 mmol/L    CO2 21 (L) 23 - 29 mmol/L    Glucose 71 (L) 74 - 106 mg/dL    BUN, Bld 7 7 - 17 mg/dL    Creatinine 0.50 (L) 0.70 - 1.20 mg/dL    Calcium 9.2 8.4 - 10.2 mg/dL    Total Protein 6.4 6.3 - 8.2 g/dL    Albumin 3.6 3.5 - 5.2 g/dL    Total Bilirubin 0.4 0.2 - 1.3 mg/dL    Alkaline Phosphatase 90 38 - 145 U/L    AST 21 14 - 36 U/L    ALT 33 10 - 44 U/L    eGFR if African American >60 >60 mL/min/1.73 m^2    eGFR if non African American >60 >60 mL/min/1.73 m^2   Protein / creatinine ratio, urine    Collection Time: 08/22/17 12:10 PM   Result Value Ref Range    Protein, Urine Random 184 mg/dL    Creatinine, Random Ur 33.8 mg/dL    Prot/Creat Ratio, Ur 5.44 (H) <0.20 mg/g   Toxicology screen, urine    Collection Time: 08/22/17  4:40 PM   Result Value Ref Range    Alcohol, Urine <10 <10 mg/dL    Benzodiazepines Negative     Methadone metabolites Negative     Cocaine (Metab.) Negative     Opiate Scrn, Ur Negative     Barbiturate Screen, Ur Negative     Amphetamine Screen, Ur Negative     THC Negative     Phencyclidine Negative     Creatinine, Random Ur 27.6 15.0 - 325.0 mg/dL    Toxicology Information SEE COMMENT    Urinalysis    Collection Time: 08/22/17  4:41 PM   Result Value Ref Range    Specimen UA Urine, Clean Catch     Color, UA Yellow Yellow, Straw, [place]    Appearance, UA Clear Clear    pH, UA 6.0 5.0 - 8.0    Specific Gravity, UA >=1.030 (A) 1.005 - 1.030    Protein, UA 2+ (A) Negative    Glucose, UA Negative Negative    Ketones, UA 2+ (A) Negative    Bilirubin (UA) Negative Negative    Occult Blood UA 1+ (A) Negative    Nitrite, UA Negative Negative    Urobilinogen, UA Negative <2.0 EU/dL    Leukocytes, UA Negative Negative   Urinalysis Microscopic    Collection Time: 08/22/17  4:41 PM   Result Value Ref Range    RBC, UA 20 (H) 0 - 4 /hpf    WBC, UA 10 (H) 0 - 5 /hpf    Bacteria, UA FEW None-Occ /hpf    Hyaline Casts, UA 0 0-1/lpf  /lpf    Microscopic Comment SEE COMMENT    AST (SGOT)    Collection Time: 17  7:30 PM   Result Value Ref Range    AST 25 10 - 40 U/L   CBC auto differential    Collection Time: 17  7:30 PM   Result Value Ref Range    WBC 22.82 (H) 3.90 - 12.70 K/uL    RBC 4.61 4.00 - 5.40 M/uL    Hemoglobin 13.1 12.0 - 16.0 g/dL    Hematocrit 38.3 37.0 - 48.5 %    MCV 83 82 - 98 fL    MCH 28.4 27.0 - 31.0 pg    MCHC 34.2 32.0 - 36.0 g/dL    RDW 12.8 11.5 - 14.5 %    Platelets 322 150 - 350 K/uL    MPV 11.1 9.2 - 12.9 fL    Gran # 21.8 (H) 1.8 - 7.7 K/uL    Lymph # 0.8 (L) 1.0 - 4.8 K/uL    Mono # 0.1 (L) 0.3 - 1.0 K/uL    Eos # 0.0 0.0 - 0.5 K/uL    Baso # 0.01 0.00 - 0.20 K/uL    Gran% 95.6 (H) 38.0 - 73.0 %    Lymph% 3.5 (L) 18.0 - 48.0 %    Mono% 0.5 (L) 4.0 - 15.0 %    Eosinophil% 0.0 0.0 - 8.0 %    Basophil% 0.0 0.0 - 1.9 %    Differential Method Automated    Creatinine, serum    Collection Time: 17  7:30 PM   Result Value Ref Range    Creatinine 0.6 0.5 - 1.4 mg/dL    eGFR if African American >60 >60 mL/min/1.73 m^2    eGFR if non African American >60 >60 mL/min/1.73 m^2   Hepatic function panel    Collection Time: 17  7:30 PM   Result Value Ref Range    Total Protein 7.6 6.0 - 8.4 g/dL    Albumin 3.2 (L) 3.5 - 5.2 g/dL    Total Bilirubin 0.2 0.1 - 1.0 mg/dL    Bilirubin, Direct 0.1 0.1 - 0.3 mg/dL    AST 25 10 - 40 U/L    ALT 24 10 - 44 U/L    Alkaline Phosphatase 136 (H) 55 - 135 U/L   RAPID HIV    Collection Time: 17  7:30 PM   Result Value Ref Range    HIV Rapid Testing Negative Negative   T4, free    Collection Time: 17  7:30 PM   Result Value Ref Range    Free T4 1.07 0.71 - 1.51 ng/dL   TSH    Collection Time: 17  7:30 PM   Result Value Ref Range    TSH 0.100 (L) 0.400 - 4.000 uIU/mL        Assessment:     22 y.o.  female at 25w0d, on IV magnesium sulfate.    Active Hospital Problems    Diagnosis  POA    *Pre-eclampsia, severe, antepartum [O14.10]  Yes    Intrauterine  growth restriction affecting antepartum care of mother in second trimester [O36.5920]  Yes    Hx of chlamydia infection [Z86.19]  Unknown    Hyperthyroidism [E05.90]  Unknown    History of hepatitis C [Z86.19]  Unknown    25 weeks gestation of pregnancy [Z3A.25]  Not Applicable    Asthma [J45.909]  Yes      Resolved Hospital Problems    Diagnosis Date Resolved POA    Preeclampsia, severe [O14.10] 08/22/2017 Yes        Plan:     - Continue magnesium sulfate, no signs of toxicity at this time  - Close maternal monitoring including UOP and BP               - BP: (142-188)/() 172/92 s/p nifedipine 24 hr tablet 60 mg at 1725, 10 mg capsule at 2050; 10 mg capsule due at 2145               - UOP: not charted, but good output - about 1000 ml at 2100 per nuse  - Recheck in 4 hours or PRN    Mo Goel

## 2017-08-23 NOTE — PLAN OF CARE
Attempted to meet with pt after consult ordered for depression and Pre E which could cause early delivery. Pt had a headache and was about to take a nap. SW introduced self and informed pt she would come back at a later time.     Will cont to f/u.    JERRY Corbinsdiana [facility]'s [facility]  [email or web address]    (phone) [phone] or  Ext. [phone]  (fax) [phone]

## 2017-08-23 NOTE — CONSULTS
"I had the pleasure of evaluating Myrtle Valdez who is now 22 y.o.  and carrying her 2nd pregnancy at 25 1/7 weeks gestation. She was referred for evaluation of the fetal heart due to MFM evaluation suspicious for VSD in face of severe preeclampsia, evidence of fetal compromise and severe IUGR.    Current Facility-Administered Medications:     acetaminophen tablet 650 mg, 650 mg, Oral, Q6H PRN, Kylie Sumner MD, 650 mg at 08/23/17 1131    diphenhydrAMINE capsule 25 mg, 25 mg, Oral, Q4H PRN, Kaity Nascimento MD    diphenhydrAMINE injection 25 mg, 25 mg, Intravenous, Q4H PRN, Kaity Nascimento MD    nifedipine 24 hr tablet 30 mg, 30 mg, Oral, Daily, Jayleen Ruelas MD, 30 mg at 08/23/17 0904    ondansetron disintegrating tablet 8 mg, 8 mg, Oral, Q8H PRN, Kaity Nascimento MD    prenatal vitamin oral tablet, 1 tablet, Oral, Daily, Kaity Nascimento MD, 1 each at 08/23/17 0904    promethazine (PHENERGAN) 12.5 mg in dextrose 5 % 50 mL IVPB, 12.5 mg, Intravenous, Q6H PRN, Kaity Nascimento MD    simethicone chewable tablet 80 mg, 1 tablet, Oral, Q6H PRN, Kaity Nascimento MD  Review of patient's allergies indicates:  No Known Allergies    Maternal factors increasing risk for congenital heart disease: Previous fetal loss. Great grandmother had a child with "hole in the heart"  Paternal factors increasing risk for congenital heart disease: None identified.    FETAL ECHOCARDIOGRAM:  Technically difficult acoustic witndows :  Femur measurements 20 weeks EGA consistent with history of IUGR.  Abnormal flow in umbilical arteries - discontinuous but no flow reversal noted.  Normal umbilical vein flow noted by pulsed wave doppler.  The ductus venosus Doppler pattern is normal  Segmental anatomy appears normal.  Qualitatively good biventricular function.  (Full report in electronic medical record)    I reviewed the strengths and weaknesses of fetal echocardiography. I also reviewed the current study. The echocardiogram today " demonstrates normal anatomical connections and function for the estimated gestational age with some limitations secondary to available acoustic windows. Within the limitations imposed by fetal physiology, I would expect a reasonableprobability that this infant will be born with a normal heart.    The main concerns raised by this study are the markers for fetal distress. The infant is small for gestational age as noted by MFM and the umbilical artery flow is compromised. The size of the fetus and the circulatory abnormalities are of significant concerns. I reviewed these observations with MFM and they share these concerns.    I answered all the familyss questions regarding the fetal heart. I have not scheduled another evaluation; however, if questions arise later during gestation or after delivery, I would be happy to assist in any way. Ms. Valdez is comfortable with the plan.    RECOMMENDATIONS:  Evaluation of the infant after delivery if the clinical exam is abnormal      Note:  Over 50% of visit in consultation with the family >30minutes

## 2017-08-23 NOTE — PLAN OF CARE
Problem: Patient Care Overview  Goal: Plan of Care Review  Outcome: Ongoing (interventions implemented as appropriate)  Pt doing well this morning. Blood pressures have been elevated in severe range in the earlier part of the shift, but were controlled with two additional does of PO procardia. Pt denies blurry vision, right upper epigastric pain/tenderness, denies headache and SOB. No complaints of LOF, contractions. Pulse remained tachycardic throughout shift, but patient remained asymptomatic. Will continue to monitor.

## 2017-08-23 NOTE — ASSESSMENT & PLAN NOTE
- AEDF, EFW 379g, 1% on 8/22  - Fetus also has hyperechoic fetal bowel and possible VSD  - Work Up: CMV, parvo, toxoplasmosis --> pending  - Urine tox neg  - Verify FHT q shift

## 2017-08-23 NOTE — PROGRESS NOTES
"MAG NOTE     Myrtle Valdez is a 22 y.o.  who is 25w1d presented as a transport from [facility] secondary to PreE w/SF and FGR with AEDF, on MgSO4 for seizure ppx..    Patient reports complete resolution of her mild headache this afternoon with tylenol. Patient denies blurry vision, right upper quadrant pain, CP, SOB, nausea/vomiting.     Objective:       /68   Pulse 97   Temp 98.1 °F (36.7 °C) (Temporal)   Resp 18   Ht 5' 3" (1.6 m)   Wt 93.4 kg (206 lb)   LMP 2017   SpO2 100%   Breastfeeding? No   BMI 36.49 kg/m²   Vitals:    17 1114 17 1206 17 1248 17 1249   BP:  133/77 118/68    Pulse: 109 (!) 112 96 97   Resp:   18    Temp:  98.1 °F (36.7 °C)     TempSrc:  Temporal     SpO2: 100% (!) 93%  100%   Weight:       Height:         I/O last 3 completed shifts:  In: 2226.7 [P.O.:900; I.V.:1326.7]  Out: 2975 [Urine:2975]    I/O this shift:  In: 500 [I.V.:500]  Out: 1300 [Urine:1300]      Date 17 0700 - 17 0659   Shift 8935-9023 8117-1469 5639-8194 24 Hour Total   I  N  T  A  K  E   I.V.  (mL/kg) 500  (5.4)   500  (5.4)    Shift Total  (mL/kg) 500  (5.4)   500  (5.4)   O  U  T  P  U  T   Urine  (mL/kg/hr) 1300  (1.7)   1300    Shift Total  (mL/kg) 1300  (13.9)   1300  (13.9)   Weight (kg) 93.4 93.4 93.4 93.4     General:   alert, appears stated age and cooperative   Lungs:   clear to auscultation bilaterally   Heart:   regular rate and rhythm, S1, S2 normal, no murmur, click, rub or gallop   Abdomen:  soft, non-tender; bowel sounds normal; no masses,  no organomegaly   Uterine Size:  gravid   Extremities: no pedal edema noted   Reflexes - 2+  bilaterally     Lab Review    Chemistries:     Recent Labs  Lab 17  1151 17  1930     --    K 3.8  --      --    CO2 21*  --    BUN 7  --    CREATININE 0.50* 0.6   CALCIUM 9.2  --    PROT 6.4 7.6   BILITOT 0.4 0.2   ALKPHOS 90 136*   ALT 33 24   AST 21 25  25        CBC/Anemia Labs: "     Recent Labs  Lab 17  1151 17  1930   WBC 17.90* 22.82*   HGB 11.7* 13.1   HCT 35.8* 38.3    322   MCV 85 83   RDW 13.3 12.8         Assessment:     Myrtle Valdez is a 22 y.o.  at 25w1d admitted for PreE w/SF and FGR with AEDF, on MgSO4 for seizure ppx.     Plan:   1. Continue magnesium sulfate, no signs of toxicity at this time  2. Monitor for s/s of worsening Pre-Eclampsia   - c/o headache that is mild, tylenol ordered, will continue to monitor closely  3. Close maternal monitoring including UOP and BP   - BP: (118-188)/() 118/68    - UOP ~200 cc/hr  4. D/c mag sulfate at this time; d/c mathews catheter  5. Intrapartum - FHT verified q shift    Kylie Sumner MD, PhD  OBGYN, PGY-2

## 2017-08-23 NOTE — ASSESSMENT & PLAN NOTE
Pre-Eclampsia w/ Severe Features (BP) at 25w1d complicated by FGR with AEDF  - Pre-E Labs stable - P/C: 5.44, AST: 21>25, Plt:240>322, Cr:0.5>0.6  - Continue MgSO4 for seizure prophylaxis - s/p 4g load, continue at 2g per hour. Will closely monitor for UOP and for signs of MgSO4 toxicity.   - Started on Procardia 30XL on admission  - BPs - s/p hydralazine 5mg IV at OSH, s/p procardia 10mg po x 2 overnight  - BP: (122-188)/() 135/77, Will continue to monitor BPs.   - Continue BMZ series, 2nd dose due at 1100 today

## 2017-08-23 NOTE — MEDICAL/APP STUDENT
Magnesium Assessment Note    Subjective:         Myrtle Valdez is a 22 y.o. female at 25w1d on IV MgSO4 for seizure prophylaxis.    Patient denies headaches, denies blurry vision and denies right upper quadrant pain. Denies CP, denies SOB. Patient reports no nausea/no vomiting.     Objective:      Temp:  [97 °F (36.1 °C)-98 °F (36.7 °C)] 97.4 °F (36.3 °C)  Pulse:  [] 112  Resp:  [16] 16  SpO2:  [98 %-100 %] 100 %  BP: (132-188)/() 132/88    I/O last 3 completed shifts:  In: 100 [I.V.:100]  Out: 550 [Urine:550]  I/O this shift:  In: -   Out: 1125 [Urine:1125]    General:   alert, cooperative and no distress   Lungs:   clear to auscultation bilaterally   Heart::   regular rate and rhythm, S1, S2 normal, no murmur, click, rub or gallop   Extremities: peripheral pulses normal, no pedal edema, no clubbing or cyanosis   DTRs- 2+  bilaterally       Lab Review  Recent Results (from the past 24 hour(s))   Type & Screen    Collection Time: 08/22/17 11:45 AM   Result Value Ref Range    Group & Rh B POS     Indirect Joao GEL NEG    CBC with Auto Differential    Collection Time: 08/22/17 11:51 AM   Result Value Ref Range    WBC 17.90 (H) 3.90 - 12.70 K/uL    RBC 4.22 4.00 - 5.40 M/uL    Hemoglobin 11.7 (L) 12.0 - 16.0 g/dL    Hematocrit 35.8 (L) 37.0 - 48.5 %    MCV 85 82 - 98 fL    MCH 27.7 27.0 - 31.0 pg    MCHC 32.7 32.0 - 36.0 g/dL    RDW 13.3 11.5 - 14.5 %    Platelets 240 150 - 350 K/uL    MPV 9.8 9.2 - 12.9 fL    Gran # 15.1 (H) 1.8 - 7.7 K/uL    Lymph # 1.5 1.0 - 4.8 K/uL    Mono # 1.0 0.3 - 1.0 K/uL    Eos # 0.2 0.0 - 0.5 K/uL    Baso # 0.10 0.00 - 0.20 K/uL    nRBC 0 0 /100 WBC    Gran% 84.6 (H) 38.0 - 73.0 %    Lymph% 8.1 (L) 18.0 - 48.0 %    Mono% 5.5 4.0 - 15.0 %    Eosinophil% 1.2 0.0 - 8.0 %    Basophil% 0.6 0.0 - 1.9 %    Differential Method Automated    Comprehensive Metabolic Panel (CMP)    Collection Time: 08/22/17 11:51 AM   Result Value Ref Range    Sodium 138 136 - 145 mmol/L    Potassium  3.8 3.5 - 5.1 mmol/L    Chloride 109 95 - 110 mmol/L    CO2 21 (L) 23 - 29 mmol/L    Glucose 71 (L) 74 - 106 mg/dL    BUN, Bld 7 7 - 17 mg/dL    Creatinine 0.50 (L) 0.70 - 1.20 mg/dL    Calcium 9.2 8.4 - 10.2 mg/dL    Total Protein 6.4 6.3 - 8.2 g/dL    Albumin 3.6 3.5 - 5.2 g/dL    Total Bilirubin 0.4 0.2 - 1.3 mg/dL    Alkaline Phosphatase 90 38 - 145 U/L    AST 21 14 - 36 U/L    ALT 33 10 - 44 U/L    eGFR if African American >60 >60 mL/min/1.73 m^2    eGFR if non African American >60 >60 mL/min/1.73 m^2   Protein / creatinine ratio, urine    Collection Time: 08/22/17 12:10 PM   Result Value Ref Range    Protein, Urine Random 184 mg/dL    Creatinine, Random Ur 33.8 mg/dL    Prot/Creat Ratio, Ur 5.44 (H) <0.20 mg/g   Toxicology screen, urine    Collection Time: 08/22/17  4:40 PM   Result Value Ref Range    Alcohol, Urine <10 <10 mg/dL    Benzodiazepines Negative     Methadone metabolites Negative     Cocaine (Metab.) Negative     Opiate Scrn, Ur Negative     Barbiturate Screen, Ur Negative     Amphetamine Screen, Ur Negative     THC Negative     Phencyclidine Negative     Creatinine, Random Ur 27.6 15.0 - 325.0 mg/dL    Toxicology Information SEE COMMENT    Urinalysis    Collection Time: 08/22/17  4:41 PM   Result Value Ref Range    Specimen UA Urine, Clean Catch     Color, UA Yellow Yellow, Straw, [place]    Appearance, UA Clear Clear    pH, UA 6.0 5.0 - 8.0    Specific Gravity, UA >=1.030 (A) 1.005 - 1.030    Protein, UA 2+ (A) Negative    Glucose, UA Negative Negative    Ketones, UA 2+ (A) Negative    Bilirubin (UA) Negative Negative    Occult Blood UA 1+ (A) Negative    Nitrite, UA Negative Negative    Urobilinogen, UA Negative <2.0 EU/dL    Leukocytes, UA Negative Negative   Urinalysis Microscopic    Collection Time: 08/22/17  4:41 PM   Result Value Ref Range    RBC, UA 20 (H) 0 - 4 /hpf    WBC, UA 10 (H) 0 - 5 /hpf    Bacteria, UA FEW None-Occ /hpf    Hyaline Casts, UA 0 0-1/lpf /lpf    Microscopic Comment SEE  COMMENT    AST (SGOT)    Collection Time: 17  7:30 PM   Result Value Ref Range    AST 25 10 - 40 U/L   CBC auto differential    Collection Time: 17  7:30 PM   Result Value Ref Range    WBC 22.82 (H) 3.90 - 12.70 K/uL    RBC 4.61 4.00 - 5.40 M/uL    Hemoglobin 13.1 12.0 - 16.0 g/dL    Hematocrit 38.3 37.0 - 48.5 %    MCV 83 82 - 98 fL    MCH 28.4 27.0 - 31.0 pg    MCHC 34.2 32.0 - 36.0 g/dL    RDW 12.8 11.5 - 14.5 %    Platelets 322 150 - 350 K/uL    MPV 11.1 9.2 - 12.9 fL    Gran # 21.8 (H) 1.8 - 7.7 K/uL    Lymph # 0.8 (L) 1.0 - 4.8 K/uL    Mono # 0.1 (L) 0.3 - 1.0 K/uL    Eos # 0.0 0.0 - 0.5 K/uL    Baso # 0.01 0.00 - 0.20 K/uL    Gran% 95.6 (H) 38.0 - 73.0 %    Lymph% 3.5 (L) 18.0 - 48.0 %    Mono% 0.5 (L) 4.0 - 15.0 %    Eosinophil% 0.0 0.0 - 8.0 %    Basophil% 0.0 0.0 - 1.9 %    Differential Method Automated    Creatinine, serum    Collection Time: 17  7:30 PM   Result Value Ref Range    Creatinine 0.6 0.5 - 1.4 mg/dL    eGFR if African American >60 >60 mL/min/1.73 m^2    eGFR if non African American >60 >60 mL/min/1.73 m^2   Hepatic function panel    Collection Time: 17  7:30 PM   Result Value Ref Range    Total Protein 7.6 6.0 - 8.4 g/dL    Albumin 3.2 (L) 3.5 - 5.2 g/dL    Total Bilirubin 0.2 0.1 - 1.0 mg/dL    Bilirubin, Direct 0.1 0.1 - 0.3 mg/dL    AST 25 10 - 40 U/L    ALT 24 10 - 44 U/L    Alkaline Phosphatase 136 (H) 55 - 135 U/L   RAPID HIV    Collection Time: 17  7:30 PM   Result Value Ref Range    HIV Rapid Testing Negative Negative   T4, free    Collection Time: 17  7:30 PM   Result Value Ref Range    Free T4 1.07 0.71 - 1.51 ng/dL   TSH    Collection Time: 17  7:30 PM   Result Value Ref Range    TSH 0.100 (L) 0.400 - 4.000 uIU/mL   Type & Screen    Collection Time: 17  7:30 PM   Result Value Ref Range    Group & Rh B POS     Indirect Joao NEG         Assessment:     22 y.o.  female at 25w1d, on IV magnesium sulfate.    Active Hospital  Problems    Diagnosis  POA    *Pre-eclampsia, severe, antepartum [O14.10]  Yes    Intrauterine growth restriction affecting antepartum care of mother in second trimester [O36.0548]  Yes    Hx of chlamydia infection [Z86.19]  Unknown    Hyperthyroidism [E05.90]  Unknown    History of hepatitis C [Z86.19]  Unknown    25 weeks gestation of pregnancy [Z3A.25]  Not Applicable    Asthma [J45.909]  Yes      Resolved Hospital Problems    Diagnosis Date Resolved POA    Preeclampsia, severe [O14.10] 08/22/2017 Yes        Plan:     - Continue magnesium sulfate, no signs of toxicity at this time  - Close maternal monitoring including UOP and BP                - BP: (132-188)/() 132/88; BP trending in the 130s/80s since 2200 last night                - UOP: 600 ml at 0000, 200 ml at 0200  - Recheck in 4 hours or PRN    Mo Goel

## 2017-08-23 NOTE — PROGRESS NOTES
Dr Sumner informed of patient's complaint of a headache, 4 on pain scale and no other pre-eclamptic symptoms and current blood pressure; order for Tylenol written

## 2017-08-23 NOTE — PROGRESS NOTES
Magnesium Assessment Note    Subjective:         Myrtle Valdez is a 22 y.o. female at 25w0d on IV MgSO4 for seizure prophylaxis.    Patient denies headaches, denies blurry vision and denies right upper quadrant pain. Denies CP, denies SOB. Patient reports no nausea/no vomiting.     Objective:      Temp:  [97 °F (36.1 °C)-98 °F (36.7 °C)] 98 °F (36.7 °C)  Pulse:  [] 123  Resp:  [16] 16  SpO2:  [99 %-100 %] 100 %  BP: (142-188)/() 172/92    I/O last 3 completed shifts:  In: 100 [I.V.:100]  Out: 550 [Urine:550]  No intake/output data recorded.    General:   alert, cooperative and no distress   Lungs:   clear to auscultation bilaterally   Heart::   regular rate and rhythm and S1, S2 normal   Extremities: peripheral pulses normal, no pedal edema, no clubbing or cyanosis   DTRs- 2+  bilaterally     FHTs verified 130 bpm    Lab Review  Recent Results (from the past 24 hour(s))   Type & Screen    Collection Time: 08/22/17 11:45 AM   Result Value Ref Range    Group & Rh B POS     Indirect Joao GEL NEG    CBC with Auto Differential    Collection Time: 08/22/17 11:51 AM   Result Value Ref Range    WBC 17.90 (H) 3.90 - 12.70 K/uL    RBC 4.22 4.00 - 5.40 M/uL    Hemoglobin 11.7 (L) 12.0 - 16.0 g/dL    Hematocrit 35.8 (L) 37.0 - 48.5 %    MCV 85 82 - 98 fL    MCH 27.7 27.0 - 31.0 pg    MCHC 32.7 32.0 - 36.0 g/dL    RDW 13.3 11.5 - 14.5 %    Platelets 240 150 - 350 K/uL    MPV 9.8 9.2 - 12.9 fL    Gran # 15.1 (H) 1.8 - 7.7 K/uL    Lymph # 1.5 1.0 - 4.8 K/uL    Mono # 1.0 0.3 - 1.0 K/uL    Eos # 0.2 0.0 - 0.5 K/uL    Baso # 0.10 0.00 - 0.20 K/uL    nRBC 0 0 /100 WBC    Gran% 84.6 (H) 38.0 - 73.0 %    Lymph% 8.1 (L) 18.0 - 48.0 %    Mono% 5.5 4.0 - 15.0 %    Eosinophil% 1.2 0.0 - 8.0 %    Basophil% 0.6 0.0 - 1.9 %    Differential Method Automated    Comprehensive Metabolic Panel (CMP)    Collection Time: 08/22/17 11:51 AM   Result Value Ref Range    Sodium 138 136 - 145 mmol/L    Potassium 3.8 3.5 - 5.1 mmol/L     Chloride 109 95 - 110 mmol/L    CO2 21 (L) 23 - 29 mmol/L    Glucose 71 (L) 74 - 106 mg/dL    BUN, Bld 7 7 - 17 mg/dL    Creatinine 0.50 (L) 0.70 - 1.20 mg/dL    Calcium 9.2 8.4 - 10.2 mg/dL    Total Protein 6.4 6.3 - 8.2 g/dL    Albumin 3.6 3.5 - 5.2 g/dL    Total Bilirubin 0.4 0.2 - 1.3 mg/dL    Alkaline Phosphatase 90 38 - 145 U/L    AST 21 14 - 36 U/L    ALT 33 10 - 44 U/L    eGFR if African American >60 >60 mL/min/1.73 m^2    eGFR if non African American >60 >60 mL/min/1.73 m^2   Protein / creatinine ratio, urine    Collection Time: 08/22/17 12:10 PM   Result Value Ref Range    Protein, Urine Random 184 mg/dL    Creatinine, Random Ur 33.8 mg/dL    Prot/Creat Ratio, Ur 5.44 (H) <0.20 mg/g   Toxicology screen, urine    Collection Time: 08/22/17  4:40 PM   Result Value Ref Range    Alcohol, Urine <10 <10 mg/dL    Benzodiazepines Negative     Methadone metabolites Negative     Cocaine (Metab.) Negative     Opiate Scrn, Ur Negative     Barbiturate Screen, Ur Negative     Amphetamine Screen, Ur Negative     THC Negative     Phencyclidine Negative     Creatinine, Random Ur 27.6 15.0 - 325.0 mg/dL    Toxicology Information SEE COMMENT    Urinalysis    Collection Time: 08/22/17  4:41 PM   Result Value Ref Range    Specimen UA Urine, Clean Catch     Color, UA Yellow Yellow, Straw, [place]    Appearance, UA Clear Clear    pH, UA 6.0 5.0 - 8.0    Specific Gravity, UA >=1.030 (A) 1.005 - 1.030    Protein, UA 2+ (A) Negative    Glucose, UA Negative Negative    Ketones, UA 2+ (A) Negative    Bilirubin (UA) Negative Negative    Occult Blood UA 1+ (A) Negative    Nitrite, UA Negative Negative    Urobilinogen, UA Negative <2.0 EU/dL    Leukocytes, UA Negative Negative   Urinalysis Microscopic    Collection Time: 08/22/17  4:41 PM   Result Value Ref Range    RBC, UA 20 (H) 0 - 4 /hpf    WBC, UA 10 (H) 0 - 5 /hpf    Bacteria, UA FEW None-Occ /hpf    Hyaline Casts, UA 0 0-1/lpf /lpf    Microscopic Comment SEE COMMENT    AST (SGOT)     Collection Time: 17  7:30 PM   Result Value Ref Range    AST 25 10 - 40 U/L   CBC auto differential    Collection Time: 17  7:30 PM   Result Value Ref Range    WBC 22.82 (H) 3.90 - 12.70 K/uL    RBC 4.61 4.00 - 5.40 M/uL    Hemoglobin 13.1 12.0 - 16.0 g/dL    Hematocrit 38.3 37.0 - 48.5 %    MCV 83 82 - 98 fL    MCH 28.4 27.0 - 31.0 pg    MCHC 34.2 32.0 - 36.0 g/dL    RDW 12.8 11.5 - 14.5 %    Platelets 322 150 - 350 K/uL    MPV 11.1 9.2 - 12.9 fL    Gran # 21.8 (H) 1.8 - 7.7 K/uL    Lymph # 0.8 (L) 1.0 - 4.8 K/uL    Mono # 0.1 (L) 0.3 - 1.0 K/uL    Eos # 0.0 0.0 - 0.5 K/uL    Baso # 0.01 0.00 - 0.20 K/uL    Gran% 95.6 (H) 38.0 - 73.0 %    Lymph% 3.5 (L) 18.0 - 48.0 %    Mono% 0.5 (L) 4.0 - 15.0 %    Eosinophil% 0.0 0.0 - 8.0 %    Basophil% 0.0 0.0 - 1.9 %    Differential Method Automated    Creatinine, serum    Collection Time: 17  7:30 PM   Result Value Ref Range    Creatinine 0.6 0.5 - 1.4 mg/dL    eGFR if African American >60 >60 mL/min/1.73 m^2    eGFR if non African American >60 >60 mL/min/1.73 m^2   Hepatic function panel    Collection Time: 17  7:30 PM   Result Value Ref Range    Total Protein 7.6 6.0 - 8.4 g/dL    Albumin 3.2 (L) 3.5 - 5.2 g/dL    Total Bilirubin 0.2 0.1 - 1.0 mg/dL    Bilirubin, Direct 0.1 0.1 - 0.3 mg/dL    AST 25 10 - 40 U/L    ALT 24 10 - 44 U/L    Alkaline Phosphatase 136 (H) 55 - 135 U/L   RAPID HIV    Collection Time: 17  7:30 PM   Result Value Ref Range    HIV Rapid Testing Negative Negative   T4, free    Collection Time: 17  7:30 PM   Result Value Ref Range    Free T4 1.07 0.71 - 1.51 ng/dL   TSH    Collection Time: 17  7:30 PM   Result Value Ref Range    TSH 0.100 (L) 0.400 - 4.000 uIU/mL   Type & Screen    Collection Time: 17  7:30 PM   Result Value Ref Range    Group & Rh B POS     Indirect Joao NEG         Assessment:     22 y.o.  female at 25w0d, on IV magnesium sulfate.    Active Hospital Problems    Diagnosis  POA     *Pre-eclampsia, severe, antepartum [O14.10]  Yes    Intrauterine growth restriction affecting antepartum care of mother in second trimester [O36.5916]  Yes    Hx of chlamydia infection [Z86.19]  Unknown    Hyperthyroidism [E05.90]  Unknown    History of hepatitis C [Z86.19]  Unknown    25 weeks gestation of pregnancy [Z3A.25]  Not Applicable    Asthma [J45.909]  Yes      Resolved Hospital Problems    Diagnosis Date Resolved POA    Preeclampsia, severe [O14.10] 08/22/2017 Yes        Plan:     - Continue magnesium sulfate, no signs of toxicity at this time  - Close maternal monitoring including UOP and BP               - BP: (142-188)/() 172/92 s/p nifedipine 24 hr tablet 30 mg at 1725, 10 mg capsule at 2050; 10 mg capsule due at 2145 -- will re-evaluate                - UOP: not charted, but good output - about 1000 ml at 2100 per nuse  - Recheck in 4 hours or PRN    Jayleen Choi MD

## 2017-08-23 NOTE — SUBJECTIVE & OBJECTIVE
"Obstetric HPI:  Patient is doing well this morning. Patient denies headache, scotoma, RUQ pain, N/V, CP, SOB. BP elevated overnight, procardia 10po x2 (last dose 10pm). BP mild to normal range since. Denies palpitations. Denies cramping or contractions, feeling "flutters", no vaginal bleeding or LOF. Emotionally she is anxious, but appropriate with mother at bedside, looking forward to her discussion with NICU today.      Objective:     Vital Signs (Most Recent):  Temp: 97.4 °F (36.3 °C) (08/22/17 2222)  Pulse: (!) 128 (08/23/17 0632)  BP: 135/77 (08/23/17 0542)  SpO2: 100 % (08/23/17 0632) Vital Signs (24h Range):  Temp:  [97 °F (36.1 °C)-98 °F (36.7 °C)] 97.4 °F (36.3 °C)  Pulse:  [] 128  Resp:  [16] 16  SpO2:  [96 %-100 %] 100 %  BP: (132-188)/() 135/77     Weight: 93.4 kg (206 lb)  Body mass index is 36.49 kg/m².    FHT: verified at 2200 --> 130bpm      Intake/Output Summary (Last 24 hours) at 08/23/17 0635  Last data filed at 08/23/17 0600   Gross per 24 hour   Intake          2226.67 ml   Output             1675 ml   Net           551.67 ml       Significant Labs:    Chemistries:     Recent Labs  Lab 08/22/17  1151 08/22/17 1930     --    K 3.8  --      --    CO2 21*  --    BUN 7  --    CREATININE 0.50* 0.6   CALCIUM 9.2  --    PROT 6.4 7.6   BILITOT 0.4 0.2   ALKPHOS 90 136*   ALT 33 24   AST 21 25  25        CBC/Anemia Labs:     Recent Labs  Lab 08/22/17  1151 08/22/17 1930   WBC 17.90* 22.82*   HGB 11.7* 13.1   HCT 35.8* 38.3    322   MCV 85 83   RDW 13.3 12.8        Urine Tox: neg  Rapid HIV: neg  TSH: 0.100 (low)  FT4:  1.07 (normal)    Physical Exam:   Constitutional: She is oriented to person, place, and time. She appears well-developed and well-nourished.    HENT:   Head: Normocephalic and atraumatic.     Neck: Normal range of motion.    Cardiovascular: Regular rhythm and normal heart sounds.    tachycardic    Pulmonary/Chest: Effort normal and breath sounds normal. No " respiratory distress. She has no wheezes.        Abdominal: Soft. Bowel sounds are normal. She exhibits no distension. There is no tenderness.   Gravid 25w             Musculoskeletal: Normal range of motion and moves all extremeties.   UCHE/SCDs in place       Neurological: She is alert and oriented to person, place, and time. She has normal reflexes.    Skin: Skin is warm and dry.    Psychiatric: She has a normal mood and affect.

## 2017-08-23 NOTE — PROGRESS NOTES
"MAG NOTE     Myrtle Valdez is a 22 y.o.  who is 25w1d presents as a transport from [facility] secondary to PreE w/SF and FGR with AEDF., on MgSO4 for seizure ppx..    Patient reports a mild headache, requesting tylenol. Patient denies blurry vision, right upper quadrant pain, CP, SOB, nausea/vomiting.     Objective:       /68   Pulse 97   Temp 98.1 °F (36.7 °C) (Temporal)   Resp 18   Ht 5' 3" (1.6 m)   Wt 93.4 kg (206 lb)   LMP 2017   SpO2 100%   Breastfeeding? No   BMI 36.49 kg/m²   Vitals:    17 1114 17 1206 17 1248 17 1249   BP:  133/77 118/68    Pulse: 109 (!) 112 96 97   Resp:   18    Temp:  98.1 °F (36.7 °C)     TempSrc:  Temporal     SpO2: 100% (!) 93%  100%   Weight:       Height:             I/O last 3 completed shifts:  In: 2226.7 [P.O.:900; I.V.:1326.7]  Out: 2975 [Urine:2975]    I/O this shift:  In: 500 [I.V.:500]  Out: 1300 [Urine:1300]      Date 17 0700 - 17 0659   Shift 9253-3127 4316-5157 1881-9155 24 Hour Total   I  N  T  A  K  E   I.V.  (mL/kg) 500  (5.4)   500  (5.4)    Shift Total  (mL/kg) 500  (5.4)   500  (5.4)   O  U  T  P  U  T   Urine  (mL/kg/hr) 1300   1300    Shift Total  (mL/kg) 1300  (13.9)   1300  (13.9)   Weight (kg) 93.4 93.4 93.4 93.4         General:   alert, appears stated age and cooperative   Lungs:   clear to auscultation bilaterally   Heart:   regular rate and rhythm, S1, S2 normal, no murmur, click, rub or gallop   Abdomen:  soft, non-tender; bowel sounds normal; no masses,  no organomegaly   Uterine Size:  gravid   Extremities: no pedal edema noted   Reflexes - 2+  bilaterally     FHT: verified 130 at 2200     Lab Review    Chemistries:     Recent Labs  Lab 17  1151 17  1930     --    K 3.8  --      --    CO2 21*  --    BUN 7  --    CREATININE 0.50* 0.6   CALCIUM 9.2  --    PROT 6.4 7.6   BILITOT 0.4 0.2   ALKPHOS 90 136*   ALT 33 24   AST 21 25  25        CBC/Anemia Labs: "     Recent Labs  Lab 17  1151 17  1930   WBC 17.90* 22.82*   HGB 11.7* 13.1   HCT 35.8* 38.3    322   MCV 85 83   RDW 13.3 12.8             Assessment:     Myrtle Valdez is a 22 y.o.  at 25w1d admitted for PreE w/SF and FGR with AEDF, on MgSO4 for seizure ppx.     Plan:   1. Continue magnesium sulfate, no signs of toxicity at this time  2. Monitor for s/s of worsening Pre-Eclampsia   - c/o headache that is mild, tylenol ordered, will continue to monitor closely  3. Close maternal monitoring including UOP and BP   - BP: (118-188)/() 118/68   -  cc/hr  4. Recheck in 2-4 hours or PRN  5. Intrapartum - FHT verified q shift    Kaity Nascimento M.D.  PGY-4 OB/GYN

## 2017-08-23 NOTE — PROGRESS NOTES
"[facility]  Obstetrics  Antepartum Progress Note    Patient Name: Myrtle Valdez  MRN: [id number]  Admission Date: 2017  Hospital Length of Stay: 1 days  Attending Physician: Martha Mcdermott MD  Primary Care Provider: Primary Doctor No    Subjective:     Principal Problem:Pre-eclampsia, severe, antepartum    HPI:  Myrtle Valdez is a 22 y.o. [id number] at 25w0d presents as a transport from [facility] secondary to PreE w/SF and FGR with AEDF.    This IUP is complicated by newly diagnosed PreE w/SF (severe range BP), fetal growth restriction (1%, AEDF, fetal hyperechoic bowel and possible VSD), asthma (well controlled, no meds), hx hyperthyroidism (no meds), questionable hx of HepC, hx of chlamydia.    Currently patient has no complaints. Patient denies headache, scotoma, RUQ pain, N/V, CP, SOB. Patient denies contractions, vaginal bleeding or discharge, denies LOF.   Fetal Movement: normal.     Hospital Course:  2017 - admitted to antepartum at 25w0d for newly diagnosed PreE w/SF in a pregnancy complicated by FGR (AEDF). BMZ series and Mag started at OSH. BP remained elevated, started on Procardia 30XL.  2017 - Doing well from PreE standpoint, asymptomatic, labs stable, BP now mild range.    Obstetric HPI:  Patient is doing well this morning. Patient denies headache, scotoma, RUQ pain, N/V, CP, SOB. BP elevated overnight, procardia 10po x2 (last dose 10pm). BP mild to normal range since. Denies palpitations. Denies cramping or contractions, feeling "flutters", no vaginal bleeding or LOF. Emotionally she is anxious, but appropriate with mother at bedside, looking forward to her discussion with NICU today.      Objective:     Vital Signs (Most Recent):  Temp: 97.4 °F (36.3 °C) (17)  Pulse: (!) 128 (17 0632)  BP: 135/77 (17 0542)  SpO2: 100 % (17 0632) Vital Signs (24h Range):  Temp:  [97 °F (36.1 °C)-98 °F (36.7 °C)] 97.4 °F (36.3 °C)  Pulse:  " [] 128  Resp:  [16] 16  SpO2:  [96 %-100 %] 100 %  BP: (132-188)/() 135/77     Weight: 93.4 kg (206 lb)  Body mass index is 36.49 kg/m².    FHT: verified at 2200 --> 130bpm      Intake/Output Summary (Last 24 hours) at 17 0635  Last data filed at 17 0600   Gross per 24 hour   Intake          2226.67 ml   Output             1675 ml   Net           551.67 ml       Significant Labs:    Chemistries:     Recent Labs  Lab 17  1151 17  1930     --    K 3.8  --      --    CO2 21*  --    BUN 7  --    CREATININE 0.50* 0.6   CALCIUM 9.2  --    PROT 6.4 7.6   BILITOT 0.4 0.2   ALKPHOS 90 136*   ALT 33 24   AST 21 25  25        CBC/Anemia Labs:     Recent Labs  Lab 17  11517  1930   WBC 17.90* 22.82*   HGB 11.7* 13.1   HCT 35.8* 38.3    322   MCV 85 83   RDW 13.3 12.8        Urine Tox: neg  Rapid HIV: neg  TSH: 0.100 (low)  FT4:  1.07 (normal)    Physical Exam:   Constitutional: She is oriented to person, place, and time. She appears well-developed and well-nourished.    HENT:   Head: Normocephalic and atraumatic.     Neck: Normal range of motion.    Cardiovascular: Regular rhythm and normal heart sounds.    tachycardic    Pulmonary/Chest: Effort normal and breath sounds normal. No respiratory distress. She has no wheezes.        Abdominal: Soft. Bowel sounds are normal. She exhibits no distension. There is no tenderness.   Gravid 25w             Musculoskeletal: Normal range of motion and moves all extremeties.   UCHE/SCDs in place       Neurological: She is alert and oriented to person, place, and time. She has normal reflexes.    Skin: Skin is warm and dry.    Psychiatric: She has a normal mood and affect.       Assessment/Plan:     22 y.o. female  at 25w1d for:    * Pre-eclampsia, severe, antepartum    Pre-Eclampsia w/ Severe Features (BP) at 25w1d complicated by FGR with AEDF  - Pre-E Labs stable - P/C: 5.44, AST: 21>25, Plt:240>322, Cr:0.5>0.6  -  Continue MgSO4 for seizure prophylaxis - s/p 4g load, continue at 2g per hour. Will closely monitor for UOP and for signs of MgSO4 toxicity.   - Started on Procardia 30XL on admission  - BPs - s/p hydralazine 5mg IV at OSH, s/p procardia 10mg po x 2 overnight  - BP: (122-188)/() 135/77, Will continue to monitor BPs.   - Continue BMZ series, 2nd dose due at 1100 today        Intrauterine growth restriction affecting antepartum care of mother in second trimester    - AEDF, EFW 379g, 1% on 8/22  - Fetus also has hyperechoic fetal bowel and possible VSD  - Work Up: CMV, parvo, toxoplasmosis --> pending  - Urine tox neg  - Verify FHT q shift        Tachycardia    - EKG ordered  - asymptomatic  - Pulse:  [] 128          25 weeks gestation of pregnancy    - PNV  - verify FHT q shift        History of hepatitis C    - Hep C Ab and viral load pending  - hepatic function panel normal        Hyperthyroidism    - no meds  - TSH low, FT4 normal  - thyroid antibodies pending        Hx of chlamydia infection    - Gc/CT pending        Asthma    - well controlled, no meds  - continue to monitor              Kaity Nascimento MD  Obstetrics  [facility]

## 2017-08-24 LAB
AST SERPL-CCNC: 13 U/L
BACTERIA UR CULT: NORMAL
BASOPHILS # BLD AUTO: 0.01 K/UL
BASOPHILS NFR BLD: 0 %
CREAT SERPL-MCNC: 0.6 MG/DL
DIFFERENTIAL METHOD: ABNORMAL
EOSINOPHIL # BLD AUTO: 0 K/UL
EOSINOPHIL NFR BLD: 0 %
ERYTHROCYTE [DISTWIDTH] IN BLOOD BY AUTOMATED COUNT: 13 %
EST. GFR  (AFRICAN AMERICAN): >60 ML/MIN/1.73 M^2
EST. GFR  (NON AFRICAN AMERICAN): >60 ML/MIN/1.73 M^2
HCT VFR BLD AUTO: 32 %
HCV LOG: <1.08 LOG (10) IU/ML
HCV RNA QUANT PCR: <12 IU/ML
HCV, QUALITATIVE: NOT DETECTED IU/ML
HGB BLD-MCNC: 10.8 G/DL
LYMPHOCYTES # BLD AUTO: 1.3 K/UL
LYMPHOCYTES NFR BLD: 4.9 %
MCH RBC QN AUTO: 28.1 PG
MCHC RBC AUTO-ENTMCNC: 33.8 G/DL
MCV RBC AUTO: 83 FL
MONOCYTES # BLD AUTO: 1.7 K/UL
MONOCYTES NFR BLD: 6.4 %
NEUTROPHILS # BLD AUTO: 23.1 K/UL
NEUTROPHILS NFR BLD: 88.7 %
PLATELET # BLD AUTO: 310 K/UL
PLATELET BLD QL SMEAR: ABNORMAL
PMV BLD AUTO: 10.7 FL
RBC # BLD AUTO: 3.85 M/UL
T GONDII IGM SER-ACNC: <3 AU/ML
TSH RECEP AB SER-ACNC: <1 IU/L
TSI SER-ACNC: <0.1 IU/L
WBC # BLD AUTO: 26.31 K/UL

## 2017-08-24 PROCEDURE — 36415 COLL VENOUS BLD VENIPUNCTURE: CPT

## 2017-08-24 PROCEDURE — 99231 SBSQ HOSP IP/OBS SF/LOW 25: CPT | Mod: ,,, | Performed by: OBSTETRICS & GYNECOLOGY

## 2017-08-24 PROCEDURE — 85613 RUSSELL VIPER VENOM DILUTED: CPT

## 2017-08-24 PROCEDURE — 86147 CARDIOLIPIN ANTIBODY EA IG: CPT | Mod: 59

## 2017-08-24 PROCEDURE — 85025 COMPLETE CBC W/AUTO DIFF WBC: CPT

## 2017-08-24 PROCEDURE — 84450 TRANSFERASE (AST) (SGOT): CPT

## 2017-08-24 PROCEDURE — 25000003 PHARM REV CODE 250: Performed by: OBSTETRICS & GYNECOLOGY

## 2017-08-24 PROCEDURE — 11000001 HC ACUTE MED/SURG PRIVATE ROOM

## 2017-08-24 PROCEDURE — 82565 ASSAY OF CREATININE: CPT

## 2017-08-24 PROCEDURE — 86146 BETA-2 GLYCOPROTEIN ANTIBODY: CPT

## 2017-08-24 PROCEDURE — 25000003 PHARM REV CODE 250: Performed by: STUDENT IN AN ORGANIZED HEALTH CARE EDUCATION/TRAINING PROGRAM

## 2017-08-24 RX ORDER — AMOXICILLIN 250 MG/1
500 CAPSULE ORAL EVERY 12 HOURS
Status: COMPLETED | OUTPATIENT
Start: 2017-08-24 | End: 2017-08-31

## 2017-08-24 RX ADMIN — NIFEDIPINE 30 MG: 30 TABLET, FILM COATED, EXTENDED RELEASE ORAL at 08:08

## 2017-08-24 RX ADMIN — AMOXICILLIN 500 MG: 250 CAPSULE ORAL at 05:08

## 2017-08-24 RX ADMIN — PRENATAL VIT W/ FE FUMARATE-FA TAB 27-0.8 MG 1 EACH: 27-0.8 TAB at 08:08

## 2017-08-24 NOTE — PROGRESS NOTES
"[facility]  Obstetrics  Antepartum Progress Note    Patient Name: Myrtle Valdez  MRN: [id number]  Admission Date: 2017  Hospital Length of Stay: 2 days  Attending Physician: Martha Mcdermott MD  Primary Care Provider: Primary Doctor No    Subjective:     Principal Problem:Pre-eclampsia, severe, antepartum    HPI:  Myrtle Valdez is a 22 y.o. [id number] at 25w0d presents as a transport from [facility] secondary to PreE w/SF and FGR with AEDF.    This IUP is complicated by newly diagnosed PreE w/SF (severe range BP), fetal growth restriction (1%, AEDF, fetal hyperechoic bowel and possible VSD), asthma (well controlled, no meds), hx hyperthyroidism (no meds), questionable hx of HepC, hx of chlamydia.    Currently patient has no complaints. Patient denies headache, scotoma, RUQ pain, N/V, CP, SOB. Patient denies contractions, vaginal bleeding or discharge, denies LOF.   Fetal Movement: normal.     Hospital Course:  2017 - admitted to antepartum at 25w0d for newly diagnosed PreE w/SF in a pregnancy complicated by FGR (AEDF). BMZ series and Mag started at OSH. BP remained elevated, started on Procardia 30XL.  2017 - Doing well from PreE standpoint, asymptomatic, labs stable, BP now mild range.  2017 - Continues to be stable from PrE standpoint. Platelets stable, AST stable. Continue FHR dopplers q shift.    Obstetric HPI:  Patient is doing well this morning. Patient denies headache, scotoma, RUQ pain, N/V, CP, SOB. Denies palpitations. Denies cramping or contractions, feeling "flutters", no vaginal bleeding or LOF.     Objective:     Vital Signs (Most Recent):  Temp: 98 °F (36.7 °C) (17)  Pulse: 88 (17)  Resp: 16 (17)  BP: 139/82 (17)  SpO2: 100 % (17 1723) Vital Signs (24h Range):  Temp:  [98 °F (36.7 °C)-98.7 °F (37.1 °C)] 98 °F (36.7 °C)  Pulse:  [] 88  Resp:  [16-18] 16  SpO2:  [93 %-100 %] 100 %  BP: " (115-147)/(55-86) 139/82     Weight: 93.4 kg (205 lb 14.6 oz)  Body mass index is 36.48 kg/m².    FHT: verified at 2200 --> 130bpm      Intake/Output Summary (Last 24 hours) at 17 07  Last data filed at 17 0629   Gross per 24 hour   Intake             2425 ml   Output             3400 ml   Net             -975 ml       Significant Labs:    Chemistries:     Recent Labs  Lab 17  1151 17  0513     --   --    K 3.8  --   --      --   --    CO2 21*  --   --    BUN 7  --   --    CREATININE 0.50* 0.6 0.6   CALCIUM 9.2  --   --    PROT 6.4 7.6  --    BILITOT 0.4 0.2  --    ALKPHOS 90 136*  --    ALT 33 24  --    AST 21 25  25 13        CBC/Anemia Labs:     Recent Labs  Lab 17  1151 17  0513   WBC 17.90* 22.82* 26.31*   HGB 11.7* 13.1 10.8*   HCT 35.8* 38.3 32.0*    322 310   MCV 85 83 83   RDW 13.3 12.8 13.0        Urine Tox: neg  Rapid HIV: neg  TSH: 0.100 (low)  FT4:  1.07 (normal)    Physical Exam:   Constitutional: She is oriented to person, place, and time. She appears well-developed and well-nourished.    HENT:   Head: Normocephalic and atraumatic.     Neck: Normal range of motion.    Cardiovascular: Regular rhythm and normal heart sounds.    tachycardic    Pulmonary/Chest: Effort normal and breath sounds normal. No respiratory distress. She has no wheezes.        Abdominal: Soft. Bowel sounds are normal. She exhibits no distension. There is no tenderness.   Gravid 25w             Musculoskeletal: Normal range of motion and moves all extremeties.   UCHE/SCDs in place       Neurological: She is alert and oriented to person, place, and time. She has normal reflexes.    Skin: Skin is warm and dry.    Psychiatric: She has a normal mood and affect.       Assessment/Plan:     22 y.o. female  at 25w2d for:    Tachycardia    - EKG ordered  - asymptomatic  - Transient and resolves spontaneously          25 weeks gestation of pregnancy     - PNV  - verify FHT q shift  - Will not do continuous monitoring at this time.   - Repeat MFM US with dopplers on Friday, 8/25        History of hepatitis C    - Hep C Ab and viral load pending  - hepatic function panel normal        Hyperthyroidism    - no meds  - TSH low, FT4 normal  - thyroid antibodies pending        Hx of chlamydia infection    - Gc/CT pending        Intrauterine growth restriction affecting antepartum care of mother in second trimester    - AEDF, EFW 379g, 1% on 8/22  - Fetus also has hyperechoic fetal bowel and possible VSD  - Work Up: CMV, parvo, toxoplasmosis --> pending  - Urine tox neg  - Verify FHT q shift        Asthma    - well controlled, no meds  - continue to monitor        * Pre-eclampsia, severe, antepartum    Pre-Eclampsia w/ Severe Features (BP) at 25w1d complicated by FGR with AEDF  - Pre-E Labs stable - P/C: 5.44, AST: 21>25>16, Plt:240>322>310, Cr:0.5>0.6  - Continue MgSO4 for seizure prophylaxis - s/p 4g load, continue at 2g per hour. Will closely monitor for UOP and for signs of MgSO4 toxicity.   - Started on Procardia 30XL on admission  - S/p BMZ 2/2              Ramiro Del Valle MD  Obstetrics  [facility]

## 2017-08-24 NOTE — CONSULTS
Consultation    I was asked to meet with Ms. Valdez (and her mother was present as well), who is the expectant parent of a gomez male, due to a history of pre-eclampsia, having a fetus with both severe intrauterine growth restriction and absent end diastolic blood at 25 1/7 weeks  gestation.    I visited with the patient and her mother in room 398 for half hour late in the afternoon on 2017. At the time of our meeting, the gestation was known to be 25 weeks and the estimated day of delivery is 17 by history (by ultrasound yesterday, baby is 21 3/7 weeks size-379 gms). The fetus additionally has echogenic bowel as well as a possible ventriculoseptal defect. Ms. Valdez is a 22 year old. [id number] blood type B positive  female who had been followed by her obstetrician in [place], [place] and then had her care transferred to Dr. Mcdermott and the maternal fetal medicine team. Maternal past medical history includes asthma (no medications), hyperthyroidism (no medications), and questionable Hepatitis C (tests negative at [facility]). Past surgical history includes tympanoplasty and hip surgery. Only medication taken regularly was a prenatal vitamin.    There is no history of tobacco, ethanol or recreational drug usage during this pregnancy.  Maternal testing for syphilis and HIV was negative on 2017. A culture for Group B streptococcus was negative as well. Steroid therapy has been given in order to enhance fetal lung maturity.    The chance of survival of a 25 week gestation was reported to Ms. Valdez  to be approximately 65% (national average) HOWEVER due to the severity of the intrauterine growth restriction and the significant possibility of not being able to place and endotracheal tube into a baby this size, I think the chance of survival is less than 10%. I made it clear that if intubation was not possible, we would dry the baby gently and let her hold her child until he passed away.   Should resuscitation be successfully accomplished, I reviewed the complications encountered in care of a baby born at 25 weeks gestation which could include breathing problems (surfactant deficiency requiring mechanical ventilation), infection (10-15% probability) , intracranial bleeding and feeding difficulties. The fact that she was already here, (meaning that we were not having to transport the baby by ground or air) under the care of our maternal fetal medicine team and that she understands the importance of providing human milk, could certainly have a positive influence on outcome.  I stressed that survival meant that the baby was able to be discharged home from the NICU but that it did not imply a normal neurologic outcome.  Complications of being born and surviving could include but not be limited to mental retardation, cerebral palsy, blindness or deafness.    All questions were answered and I gave the patient one of my business cards for future reference.     Thank you for the opportunity to meet Ms. Little and I will share this information with our team.        Nolan Bruno MD   Medical Director, NICU  Section Head, Neonatology    The time spent visiting with the patient and preparing this report was 1 hour.

## 2017-08-24 NOTE — PLAN OF CARE
Problem: Patient Care Overview  Goal: Plan of Care Review  Vital signs improved; mag stopped; neonatology, ,  saw patient; will monitor blood pressures

## 2017-08-24 NOTE — SUBJECTIVE & OBJECTIVE
"Obstetric HPI:  Patient is doing well this morning. Patient denies headache, scotoma, RUQ pain, N/V, CP, SOB. Denies palpitations. Denies cramping or contractions, feeling "flutters", no vaginal bleeding or LOF.     Objective:     Vital Signs (Most Recent):  Temp: 98 °F (36.7 °C) (08/24/17 0514)  Pulse: 88 (08/24/17 0514)  Resp: 16 (08/24/17 0514)  BP: 139/82 (08/24/17 0514)  SpO2: 100 % (08/23/17 1723) Vital Signs (24h Range):  Temp:  [98 °F (36.7 °C)-98.7 °F (37.1 °C)] 98 °F (36.7 °C)  Pulse:  [] 88  Resp:  [16-18] 16  SpO2:  [93 %-100 %] 100 %  BP: (115-147)/(55-86) 139/82     Weight: 93.4 kg (205 lb 14.6 oz)  Body mass index is 36.48 kg/m².    FHT: verified at 2200 --> 130bpm      Intake/Output Summary (Last 24 hours) at 08/24/17 0726  Last data filed at 08/24/17 0629   Gross per 24 hour   Intake             2425 ml   Output             3400 ml   Net             -975 ml       Significant Labs:    Chemistries:     Recent Labs  Lab 08/22/17 1151 08/22/17 1930 08/24/17 0513     --   --    K 3.8  --   --      --   --    CO2 21*  --   --    BUN 7  --   --    CREATININE 0.50* 0.6 0.6   CALCIUM 9.2  --   --    PROT 6.4 7.6  --    BILITOT 0.4 0.2  --    ALKPHOS 90 136*  --    ALT 33 24  --    AST 21 25  25 13        CBC/Anemia Labs:     Recent Labs  Lab 08/22/17 1151 08/22/17 1930 08/24/17 0513   WBC 17.90* 22.82* 26.31*   HGB 11.7* 13.1 10.8*   HCT 35.8* 38.3 32.0*    322 310   MCV 85 83 83   RDW 13.3 12.8 13.0        Urine Tox: neg  Rapid HIV: neg  TSH: 0.100 (low)  FT4:  1.07 (normal)    Physical Exam:   Constitutional: She is oriented to person, place, and time. She appears well-developed and well-nourished.    HENT:   Head: Normocephalic and atraumatic.     Neck: Normal range of motion.    Cardiovascular: Regular rhythm and normal heart sounds.    tachycardic    Pulmonary/Chest: Effort normal and breath sounds normal. No respiratory distress. She has no wheezes.        Abdominal: " Soft. Bowel sounds are normal. She exhibits no distension. There is no tenderness.   Gravid 25w             Musculoskeletal: Normal range of motion and moves all extremeties.   UCHE/SCDs in place       Neurological: She is alert and oriented to person, place, and time. She has normal reflexes.    Skin: Skin is warm and dry.    Psychiatric: She has a normal mood and affect.

## 2017-08-24 NOTE — ASSESSMENT & PLAN NOTE
Pre-Eclampsia w/ Severe Features (BP) at 25w1d complicated by FGR with AEDF  - Pre-E Labs stable - P/C: 5.44, AST: 21>25>16, Plt:240>322>310, Cr:0.5>0.6  - Continue MgSO4 for seizure prophylaxis - s/p 4g load, continue at 2g per hour. Will closely monitor for UOP and for signs of MgSO4 toxicity.   - Started on Procardia 30XL on admission  - S/p BMZ 2/2

## 2017-08-24 NOTE — ASSESSMENT & PLAN NOTE
- PNV  - verify FHT q shift  - Will not do continuous monitoring at this time.   - Repeat MFM US with dopplers on Friday, 8/25

## 2017-08-24 NOTE — PLAN OF CARE
Attempted to meet with pt again after consult ordered for depression and Pre E which could cause early delivery. Pt was napping when sw arrived. Will attempt again at a later time.       Adia Muro LCSW    [facility] [place]  [email or web address]    (phone) [phone] or  Ext. [phone]  (fax) [phone]

## 2017-08-25 LAB
BACTERIA SPEC AEROBE CULT: NORMAL
CARDIOLIPIN IGG SER IA-ACNC: <9.4 GPL
CARDIOLIPIN IGM SER IA-ACNC: <9.4 MPL
LA PPP-IMP: NEGATIVE

## 2017-08-25 PROCEDURE — 11000001 HC ACUTE MED/SURG PRIVATE ROOM

## 2017-08-25 PROCEDURE — 76820 UMBILICAL ARTERY ECHO: CPT | Mod: 26,,, | Performed by: OBSTETRICS & GYNECOLOGY

## 2017-08-25 PROCEDURE — 25000003 PHARM REV CODE 250: Performed by: STUDENT IN AN ORGANIZED HEALTH CARE EDUCATION/TRAINING PROGRAM

## 2017-08-25 PROCEDURE — 25000003 PHARM REV CODE 250: Performed by: OBSTETRICS & GYNECOLOGY

## 2017-08-25 PROCEDURE — 76815 OB US LIMITED FETUS(S): CPT | Mod: 26,,, | Performed by: OBSTETRICS & GYNECOLOGY

## 2017-08-25 PROCEDURE — 99231 SBSQ HOSP IP/OBS SF/LOW 25: CPT | Mod: 25,,, | Performed by: OBSTETRICS & GYNECOLOGY

## 2017-08-25 RX ORDER — LIDOCAINE HYDROCHLORIDE 10 MG/ML
0.5 INJECTION INFILTRATION; PERINEURAL ONCE
Status: COMPLETED | OUTPATIENT
Start: 2017-08-25 | End: 2017-08-25

## 2017-08-25 RX ADMIN — PRENATAL VIT W/ FE FUMARATE-FA TAB 27-0.8 MG 1 EACH: 27-0.8 TAB at 09:08

## 2017-08-25 RX ADMIN — AMOXICILLIN 500 MG: 250 CAPSULE ORAL at 06:08

## 2017-08-25 RX ADMIN — NIFEDIPINE 30 MG: 30 TABLET, FILM COATED, EXTENDED RELEASE ORAL at 09:08

## 2017-08-25 RX ADMIN — AMOXICILLIN 500 MG: 250 CAPSULE ORAL at 05:08

## 2017-08-25 RX ADMIN — LIDOCAINE HYDROCHLORIDE 0.5 ML: 10 INJECTION, SOLUTION INFILTRATION; PERINEURAL at 06:08

## 2017-08-25 NOTE — ASSESSMENT & PLAN NOTE
Pre-Eclampsia w/ Severe Features (BP) at 25w1d complicated by FGR with AEDF  - Pre-E Labs stable - P/C: 5.44, AST: 21>25>16, Plt:240>322>310, Cr:0.5>0.6  - Continue MgSO4 for seizure prophylaxis - s/p 4g load, continue at 2g per hour. Will closely monitor for UOP and for signs of MgSO4 toxicity.   - Continue Procardia 30XL  - S/p BMZ 2/2

## 2017-08-25 NOTE — PLAN OF CARE
Problem: Patient Care Overview  Goal: Plan of Care Review  Pt doing well, no acute changes during this shift, vital signs stable, no signs of distress, pt report positive fetal movement, pt denies contraction, vaginal bleeding, and LOF.   Pt denies headache, URQ pain, visual changes. Will continue to monitor patient.

## 2017-08-25 NOTE — PROGRESS NOTES
Indication  ========    MVP/ Dopplers: IUGR.    History  ======    General History  Height 160 cm  Height (ft) 5 ft  Height (in) 3 in    Maternal Assessment  =================    Height 160 cm  Height (ft) 5 ft  Height (in) 3 in    Method  ======    Transabdominal ultrasound examination, [place] S8.    Pregnancy  =========    Whitley pregnancy. Number of fetuses: 1.    General Evaluation  ==============    Cardiac activity: present.  bpm.  Fetal movements: visualized.  Presentation: cephalic.  Placenta: posterior.  Umbilical cord: 3 vessel cord.  Amniotic fluid: normal amountMVP 2.8 cm.    Fetal Anatomy  ===========    Cranium: documented previously  Lateral ventricles: documented previously  Choroid plexus: suboptimal  Midline falx: suboptimal  Cavum septi pellucidi: suboptimal  Cerebellum: documented previously  Cisterna magna: documented previously  Rt lateral ventricle: documented previously  Lt lateral ventricle: documented previously  Rt choroid plexus: suboptimal  Lt choroid plexus: suboptimal  Lips: suboptimal  Profile: suboptimal  Nose: suboptimal  RVOT: normal  LVOT: normal  4-chamber view: Normal , 4-chamber normal; , septum appears to be intact  Situs: normal  Aortic arch: normal  Ductal arch: normal  SVC: suboptimal  IVC: suboptimal  3-vessel view: normal  3-vessel-trachea view: suboptimal  Diaphragm: normal  Cord insertion: suboptimal  Stomach: normal  Kidneys: normal  Bladder: normal  Genitals: suboptimal  Cervical spine: documented previously  Thoracic spine: documented previously  Lumbar spine: documented previously  Sacral spine: documented previously  Arms: documented previously  Legs: documented previously    Fetal Doppler  ===========    Impression: Normal ductus venosus; absent end diastolic flow in the umbilical artery    Impression  =========    Whitley live intrauterine pregnancy.  Normal amniotic fluid volume.  Absent end diastolic flow in the umbilical artery. Normal ductus  venosus.  Echogenic bowel was noted on prior ultrasound.  Some of the anatomy remains suboptimally visualized. The remainder of the anatomy that was seen appeared normal.  Patient advised of the findings.    Recommendation  ==============    Follow up ultrasound on Tuesday for UA dopplers and fluid.

## 2017-08-25 NOTE — PROGRESS NOTES
"[facility]  Obstetrics  Antepartum Progress Note    Patient Name: Myrtle Valdez  MRN: [id number]  Admission Date: 2017  Hospital Length of Stay: 3 days  Attending Physician: Martha Mcdermott MD  Primary Care Provider: Primary Doctor No    Subjective:     Principal Problem:Pre-eclampsia, severe, antepartum    HPI:  Myrtle Valdez is a 22 y.o. [id number] at 25w0d presents as a transport from [facility] secondary to PreE w/SF and FGR with AEDF.    This IUP is complicated by newly diagnosed PreE w/SF (severe range BP), fetal growth restriction (1%, AEDF, fetal hyperechoic bowel and possible VSD), asthma (well controlled, no meds), hx hyperthyroidism (no meds), questionable hx of HepC, hx of chlamydia.    Currently patient has no complaints. Patient denies headache, scotoma, RUQ pain, N/V, CP, SOB. Patient denies contractions, vaginal bleeding or discharge, denies LOF.   Fetal Movement: normal.     Hospital Course:  2017 - admitted to antepartum at 25w0d for newly diagnosed PreE w/SF in a pregnancy complicated by FGR (AEDF). BMZ series and Mag started at OSH. BP remained elevated, started on Procardia 30XL.  2017 - Doing well from PreE standpoint, asymptomatic, labs stable, BP now mild range.  2017 - Continues to be stable from PrE standpoint. Platelets stable, AST stable. Continue FHR dopplers q shift.  2017 - Continues to be stable from PrE standpoint. Continue FHR dopplers q shift.    Obstetric HPI:  Patient is doing well this morning. Patient denies headache, scotoma, RUQ pain, N/V, CP, SOB. Denies palpitations. Denies cramping or contractions, feeling "flutters", no vaginal bleeding or LOF.     Objective:     Vital Signs (Most Recent):  Temp: 97.6 °F (36.4 °C) (17 0355)  Pulse: 76 (17 0355)  Resp: 18 (17 1606)  BP: 139/75 (17 0355)  SpO2: 100 % (17 0355) Vital Signs (24h Range):  Temp:  [96.8 °F (36 °C)-98.3 °F (36.8 °C)] 97.6 °F " (36.4 °C)  Pulse:  [] 76  Resp:  [18] 18  SpO2:  [86 %-100 %] 100 %  BP: (139-158)/(75-93) 139/75     Weight: 93.4 kg (205 lb 14.6 oz)  Body mass index is 36.48 kg/m².    FHT: verified at 2200 --> 130bpm      Intake/Output Summary (Last 24 hours) at 17 0701  Last data filed at 17 0536   Gross per 24 hour   Intake                0 ml   Output             1450 ml   Net            -1450 ml       Significant Labs:    Chemistries:     Recent Labs  Lab 17  1151 17  1930 17  0513     --   --    K 3.8  --   --      --   --    CO2 21*  --   --    BUN 7  --   --    CREATININE 0.50* 0.6 0.6   CALCIUM 9.2  --   --    PROT 6.4 7.6  --    BILITOT 0.4 0.2  --    ALKPHOS 90 136*  --    ALT 33 24  --    AST 21 25  25 13        CBC/Anemia Labs:     Recent Labs  Lab 17  1151 17  1930 17  0513   WBC 17.90* 22.82* 26.31*   HGB 11.7* 13.1 10.8*   HCT 35.8* 38.3 32.0*    322 310   MCV 85 83 83   RDW 13.3 12.8 13.0        Urine Tox: neg  Rapid HIV: neg  TSH: 0.100 (low)  FT4:  1.07 (normal)    Physical Exam:   Constitutional: She is oriented to person, place, and time. She appears well-developed and well-nourished.    HENT:   Head: Normocephalic and atraumatic.     Neck: Normal range of motion.    Cardiovascular: Regular rhythm and normal heart sounds.    tachycardic    Pulmonary/Chest: Effort normal and breath sounds normal. No respiratory distress. She has no wheezes.        Abdominal: Soft. Bowel sounds are normal. She exhibits no distension. There is no tenderness.   Gravid 25w             Musculoskeletal: Normal range of motion and moves all extremeties.   UCHE/SCDs in place       Neurological: She is alert and oriented to person, place, and time. She has normal reflexes.    Skin: Skin is warm and dry.    Psychiatric: She has a normal mood and affect.       Assessment/Plan:     22 y.o. female  at 25w3d for:    Tachycardia    - EKG ordered  -  asymptomatic  - Transient and resolves spontaneously          25 weeks gestation of pregnancy    - PNV  - verify FHT q shift  - Will not do continuous monitoring at this time.   - Repeat MFM US with dopplers on Friday, 8/25        History of hepatitis C    - Hep C viral load negative  - hepatic function panel normal        Hyperthyroidism    - no meds  - TSH low, FT4 normal  - thyroid antibodies negative        Hx of chlamydia infection    - Gc/CT pending        Intrauterine growth restriction affecting antepartum care of mother in second trimester    - AEDF, EFW 379g, 1% on 8/22  - Fetal echo negative for VSD  - Work Up: CMV, parvo, toxoplasmosis --> pending  - Urine tox neg  - Verify FHT q shift  - UA dopplers today        Asthma    - well controlled, no meds  - continue to monitor        * Pre-eclampsia, severe, antepartum    Pre-Eclampsia w/ Severe Features (BP) at 25w1d complicated by FGR with AEDF  - Pre-E Labs stable - P/C: 5.44, AST: 21>25>16, Plt:240>322>310, Cr:0.5>0.6  - Continue MgSO4 for seizure prophylaxis - s/p 4g load, continue at 2g per hour. Will closely monitor for UOP and for signs of MgSO4 toxicity.   - Continue Procardia 30XL  - S/p BMZ 2/2              Ramiro Del Valle MD  Obstetrics  [facility]

## 2017-08-25 NOTE — ASSESSMENT & PLAN NOTE
- AEDF, EFW 379g, 1% on 8/22  - Fetal echo negative for VSD  - Work Up: CMV, parvo, toxoplasmosis --> pending  - Urine tox neg  - Verify FHT q shift  - UA dopplers today

## 2017-08-25 NOTE — SUBJECTIVE & OBJECTIVE
"Obstetric HPI:  Patient is doing well this morning. Patient denies headache, scotoma, RUQ pain, N/V, CP, SOB. Denies palpitations. Denies cramping or contractions, feeling "flutters", no vaginal bleeding or LOF.     Objective:     Vital Signs (Most Recent):  Temp: 97.6 °F (36.4 °C) (08/25/17 0355)  Pulse: 76 (08/25/17 0355)  Resp: 18 (08/24/17 1606)  BP: 139/75 (08/25/17 0355)  SpO2: 100 % (08/25/17 0355) Vital Signs (24h Range):  Temp:  [96.8 °F (36 °C)-98.3 °F (36.8 °C)] 97.6 °F (36.4 °C)  Pulse:  [] 76  Resp:  [18] 18  SpO2:  [86 %-100 %] 100 %  BP: (139-158)/(75-93) 139/75     Weight: 93.4 kg (205 lb 14.6 oz)  Body mass index is 36.48 kg/m².    FHT: verified at 2200 --> 130bpm      Intake/Output Summary (Last 24 hours) at 08/25/17 0701  Last data filed at 08/25/17 0536   Gross per 24 hour   Intake                0 ml   Output             1450 ml   Net            -1450 ml       Significant Labs:    Chemistries:     Recent Labs  Lab 08/22/17  1151 08/22/17 1930 08/24/17 0513     --   --    K 3.8  --   --      --   --    CO2 21*  --   --    BUN 7  --   --    CREATININE 0.50* 0.6 0.6   CALCIUM 9.2  --   --    PROT 6.4 7.6  --    BILITOT 0.4 0.2  --    ALKPHOS 90 136*  --    ALT 33 24  --    AST 21 25  25 13        CBC/Anemia Labs:     Recent Labs  Lab 08/22/17  1151 08/22/17 1930 08/24/17 0513   WBC 17.90* 22.82* 26.31*   HGB 11.7* 13.1 10.8*   HCT 35.8* 38.3 32.0*    322 310   MCV 85 83 83   RDW 13.3 12.8 13.0        Urine Tox: neg  Rapid HIV: neg  TSH: 0.100 (low)  FT4:  1.07 (normal)    Physical Exam:   Constitutional: She is oriented to person, place, and time. She appears well-developed and well-nourished.    HENT:   Head: Normocephalic and atraumatic.     Neck: Normal range of motion.    Cardiovascular: Regular rhythm and normal heart sounds.    tachycardic    Pulmonary/Chest: Effort normal and breath sounds normal. No respiratory distress. She has no wheezes.        Abdominal: " Soft. Bowel sounds are normal. She exhibits no distension. There is no tenderness.   Gravid 25w             Musculoskeletal: Normal range of motion and moves all extremeties.   UCHE/SCDs in place       Neurological: She is alert and oriented to person, place, and time. She has normal reflexes.    Skin: Skin is warm and dry.    Psychiatric: She has a normal mood and affect.

## 2017-08-26 PROBLEM — J45.20 MILD INTERMITTENT ASTHMA WITHOUT COMPLICATION: Status: ACTIVE | Noted: 2017-08-26

## 2017-08-26 LAB
AST SERPL-CCNC: 16 U/L
B2 GLYCOPROT1 IGA SER QL: <9 SAU
B2 GLYCOPROT1 IGG SER QL: <9 SGU
B2 GLYCOPROT1 IGM SER QL: <9 SMU
BASOPHILS # BLD AUTO: 0.03 K/UL
BASOPHILS NFR BLD: 0.1 %
CREAT SERPL-MCNC: 0.6 MG/DL
DIFFERENTIAL METHOD: ABNORMAL
EOSINOPHIL # BLD AUTO: 0.1 K/UL
EOSINOPHIL NFR BLD: 0.5 %
ERYTHROCYTE [DISTWIDTH] IN BLOOD BY AUTOMATED COUNT: 12.8 %
EST. GFR  (AFRICAN AMERICAN): >60 ML/MIN/1.73 M^2
EST. GFR  (NON AFRICAN AMERICAN): >60 ML/MIN/1.73 M^2
HCT VFR BLD AUTO: 35.1 %
HGB BLD-MCNC: 11.9 G/DL
LYMPHOCYTES # BLD AUTO: 2.4 K/UL
LYMPHOCYTES NFR BLD: 10.2 %
MCH RBC QN AUTO: 28.1 PG
MCHC RBC AUTO-ENTMCNC: 33.9 G/DL
MCV RBC AUTO: 83 FL
MONOCYTES # BLD AUTO: 2 K/UL
MONOCYTES NFR BLD: 8.5 %
NEUTROPHILS # BLD AUTO: 18.9 K/UL
NEUTROPHILS NFR BLD: 80.7 %
PLATELET # BLD AUTO: 328 K/UL
PLATELET BLD QL SMEAR: ABNORMAL
PMV BLD AUTO: 10.8 FL
RBC # BLD AUTO: 4.23 M/UL
WBC # BLD AUTO: 23.75 K/UL

## 2017-08-26 PROCEDURE — 84450 TRANSFERASE (AST) (SGOT): CPT

## 2017-08-26 PROCEDURE — 36415 COLL VENOUS BLD VENIPUNCTURE: CPT

## 2017-08-26 PROCEDURE — 99231 SBSQ HOSP IP/OBS SF/LOW 25: CPT | Mod: ,,, | Performed by: OBSTETRICS & GYNECOLOGY

## 2017-08-26 PROCEDURE — 86645 CMV ANTIBODY IGM: CPT

## 2017-08-26 PROCEDURE — 11000001 HC ACUTE MED/SURG PRIVATE ROOM

## 2017-08-26 PROCEDURE — 85025 COMPLETE CBC W/AUTO DIFF WBC: CPT

## 2017-08-26 PROCEDURE — 82565 ASSAY OF CREATININE: CPT

## 2017-08-26 PROCEDURE — 25000003 PHARM REV CODE 250: Performed by: STUDENT IN AN ORGANIZED HEALTH CARE EDUCATION/TRAINING PROGRAM

## 2017-08-26 PROCEDURE — 25000003 PHARM REV CODE 250: Performed by: OBSTETRICS & GYNECOLOGY

## 2017-08-26 RX ORDER — NIFEDIPINE 30 MG/1
30 TABLET, EXTENDED RELEASE ORAL 2 TIMES DAILY
Status: DISCONTINUED | OUTPATIENT
Start: 2017-08-26 | End: 2017-08-27

## 2017-08-26 RX ADMIN — AMOXICILLIN 500 MG: 250 CAPSULE ORAL at 09:08

## 2017-08-26 RX ADMIN — PRENATAL VIT W/ FE FUMARATE-FA TAB 27-0.8 MG 1 EACH: 27-0.8 TAB at 09:08

## 2017-08-26 RX ADMIN — NIFEDIPINE 30 MG: 30 TABLET, FILM COATED, EXTENDED RELEASE ORAL at 08:08

## 2017-08-26 RX ADMIN — NIFEDIPINE 30 MG: 30 TABLET, FILM COATED, EXTENDED RELEASE ORAL at 09:08

## 2017-08-26 RX ADMIN — AMOXICILLIN 500 MG: 250 CAPSULE ORAL at 08:08

## 2017-08-26 NOTE — ASSESSMENT & PLAN NOTE
Pre-Eclampsia w/ Severe Features (BP) at 25w1d complicated by FGR with AEDF  - Pre-E Labs stable - P/C: 5.44, AST: 21>25>16, Plt:240>322>310, Cr:0.5>0.6  - Continue MgSO4 for seizure prophylaxis - s/p 4g load, continue at 2g per hour. Will closely monitor for UOP and for signs of MgSO4 toxicity.   - Increase Procardia 30XL to BID  - S/p BMZ 2/2

## 2017-08-26 NOTE — PLAN OF CARE
Problem: Patient Care Overview  Goal: Plan of Care Review  Outcome: Ongoing (interventions implemented as appropriate)  Plan of care reviewed with pt. Pt doing well and is without complaints. VS stable throughout shift, and pt remains afebrile. Pt denies pain, ctx, LOF, vaginal bleeding, headache, RUQ pain, or visual disturbances. +FM per pt. No questions or concerns at this time. Will continue to monitor.

## 2017-08-26 NOTE — PROGRESS NOTES
[facility]  Obstetrics  Antepartum Progress Note    Patient Name: Myrtle Valdez  MRN: [id number]  Admission Date: 2017  Hospital Length of Stay: 4 days  Attending Physician: Martha Mcedrmott MD  Primary Care Provider: Primary Doctor No    Subjective:     Principal Problem:Pre-eclampsia, severe, antepartum    HPI:  Myrtle Valdez is a 22 y.o. [id number] at 25w0d presents as a transport from [facility] secondary to PreE w/SF and FGR with AEDF.    This IUP is complicated by newly diagnosed PreE w/SF (severe range BP), fetal growth restriction (1%, AEDF, fetal hyperechoic bowel and possible VSD), asthma (well controlled, no meds), hx hyperthyroidism (no meds), questionable hx of HepC, hx of chlamydia.    Currently patient has no complaints. Patient denies headache, scotoma, RUQ pain, N/V, CP, SOB. Patient denies contractions, vaginal bleeding or discharge, denies LOF.   Fetal Movement: normal.     Hospital Course:  2017 - admitted to antepartum at 25w0d for newly diagnosed PreE w/SF in a pregnancy complicated by FGR (AEDF). BMZ series and Mag started at OSH. BP remained elevated, started on Procardia 30XL.  2017 - Doing well from PreE standpoint, asymptomatic, labs stable, BP now mild range.  2017 - Continues to be stable from PrE standpoint. Platelets stable, AST stable. Continue FHR dopplers q shift.  2017 - Continues to be stable from PrE standpoint. Continue FHR dopplers q shift.  2017 - Stable PreE. FHR dopplers q shift. No compaints    Obstetric HPI:  No issues this AM. Patient is without complaint. BPs stable in the mild range.     Objective:     Vital Signs (Most Recent):  Temp: 98.2 °F (36.8 °C) (17)  Pulse: 74 (17)  Resp: 16 (17)  BP: (!) 149/78 (17)  SpO2: 100 % (17) Vital Signs (24h Range):  Temp:  [97 °F (36.1 °C)-98.2 °F (36.8 °C)] 98.2 °F (36.8 °C)  Pulse:  [70-81] 74  Resp:  [14-18]  16  SpO2:  [100 %] 100 %  BP: (131-157)/(62-90) 149/78     Weight: 93 kg (205 lb)  Body mass index is 36.31 kg/m².    Intake/Output Summary (Last 24 hours) at 08/26/17 1112  Last data filed at 08/26/17 0600   Gross per 24 hour   Intake             1500 ml   Output             2175 ml   Net             -675 ml     Significant Labs:  Recent Lab Results       08/26/17  0505      AST 16     Baso # 0.03     Basophil% 0.1     Creatinine 0.6     Differential Method Automated     eGFR if  >60     eGFR if non  >60  Comment:  Calculation used to obtain the estimated glomerular filtration  rate (eGFR) is the CKD-EPI equation. Since race is unknown   in our information system, the eGFR values for   -American and Non--American patients are given   for each creatinine result.       Eos # 0.1     Eosinophil% 0.5     Gran # 18.9(H)     Gran% 80.7(H)     Hematocrit 35.1(L)     Hemoglobin 11.9(L)     Lymph # 2.4     Lymph% 10.2(L)     MCH 28.1     MCHC 33.9     MCV 83     Mono # 2.0(H)     Mono% 8.5     MPV 10.8     Platelet Estimate Appears normal     Platelets 328     RBC 4.23     RDW 12.8     WBC 23.75(H)         Physical Exam:   Constitutional: She is oriented to person, place, and time. She appears well-developed and well-nourished.    HENT:   Head: Normocephalic and atraumatic.     Neck: Normal range of motion.    Cardiovascular: Regular rhythm and normal heart sounds.     Pulmonary/Chest: Effort normal and breath sounds normal. No respiratory distress. She has no wheezes.        Abdominal: Soft. Bowel sounds are normal. She exhibits no distension. There is no tenderness.   Gravid 25w             Musculoskeletal: Normal range of motion and moves all extremeties.   UCHE/SCDs in place       Neurological: She is alert and oriented to person, place, and time. She has normal reflexes.    Skin: Skin is warm and dry.    Psychiatric: She has a normal mood and affect.       Assessment/Plan:      22 y.o. female  at 25w4d for:    Tachycardia    - EKG ordered  - asymptomatic  - Transient and resolves spontaneously          25 weeks gestation of pregnancy    - PNV  - verify FHT q shift  - Will not do continuous monitoring at this time.   - Repeat MFM US with dopplers on Friday,         History of hepatitis C    - Hep C viral load negative  - hepatic function panel normal        Hyperthyroidism    - no meds  - TSH low, FT4 normal  - thyroid antibodies negative        Hx of chlamydia infection    - Gc/CT  was negative        Intrauterine growth restriction affecting antepartum care of mother in second trimester    - AEDF, EFW 379g, 1% on   - Fetal echo negative for VSD  - Work Up: CMV, parvo, toxoplasmosis --> pending  - Urine tox neg  - Verify FHT q shift  - UA dopplers today        Asthma    - well controlled, no meds  - continue to monitor        * Pre-eclampsia, severe, antepartum    Pre-Eclampsia w/ Severe Features (BP) at 25w1d complicated by FGR with AEDF  - Pre-E Labs stable - P/C: 5.44, AST: 21>25>16, Plt:240>322>310, Cr:0.5>0.6  - Continue MgSO4 for seizure prophylaxis - s/p 4g load, continue at 2g per hour. Will closely monitor for UOP and for signs of MgSO4 toxicity.   - Increase Procardia 30XL to BID  - S/p BMZ 2/2          Kylie Sumner MD  Obstetrics  [facility]        I have seen and examined the patient and agree with the resident note. No current preeclampsia symptoms. BP trending upper 150s. Adjust Procardia XL to 30mg bid. Labs stable. Negative LAC and  ALCA. Doptones q shift. AEDF persisted yesterday on ultrasound. Continue expectant management of preeclampsia.

## 2017-08-26 NOTE — SUBJECTIVE & OBJECTIVE
Obstetric HPI:  No issues this AM. Patient is without complaint. BPs stable in the mild range.     Objective:     Vital Signs (Most Recent):  Temp: 98.2 °F (36.8 °C) (08/26/17 0739)  Pulse: 74 (08/26/17 0739)  Resp: 16 (08/26/17 0739)  BP: (!) 149/78 (08/26/17 0739)  SpO2: 100 % (08/26/17 0739) Vital Signs (24h Range):  Temp:  [97 °F (36.1 °C)-98.2 °F (36.8 °C)] 98.2 °F (36.8 °C)  Pulse:  [70-81] 74  Resp:  [14-18] 16  SpO2:  [100 %] 100 %  BP: (131-157)/(62-90) 149/78     Weight: 93 kg (205 lb)  Body mass index is 36.31 kg/m².    Intake/Output Summary (Last 24 hours) at 08/26/17 1112  Last data filed at 08/26/17 0600   Gross per 24 hour   Intake             1500 ml   Output             2175 ml   Net             -675 ml     Significant Labs:  Recent Lab Results       08/26/17  0505      AST 16     Baso # 0.03     Basophil% 0.1     Creatinine 0.6     Differential Method Automated     eGFR if  >60     eGFR if non  >60  Comment:  Calculation used to obtain the estimated glomerular filtration  rate (eGFR) is the CKD-EPI equation. Since race is unknown   in our information system, the eGFR values for   -American and Non--American patients are given   for each creatinine result.       Eos # 0.1     Eosinophil% 0.5     Gran # 18.9(H)     Gran% 80.7(H)     Hematocrit 35.1(L)     Hemoglobin 11.9(L)     Lymph # 2.4     Lymph% 10.2(L)     MCH 28.1     MCHC 33.9     MCV 83     Mono # 2.0(H)     Mono% 8.5     MPV 10.8     Platelet Estimate Appears normal     Platelets 328     RBC 4.23     RDW 12.8     WBC 23.75(H)         Physical Exam:   Constitutional: She is oriented to person, place, and time. She appears well-developed and well-nourished.    HENT:   Head: Normocephalic and atraumatic.     Neck: Normal range of motion.    Cardiovascular: Regular rhythm and normal heart sounds.     Pulmonary/Chest: Effort normal and breath sounds normal. No respiratory distress. She has no  wheezes.        Abdominal: Soft. Bowel sounds are normal. She exhibits no distension. There is no tenderness.   Gravid 25w             Musculoskeletal: Normal range of motion and moves all extremeties.   UCHE/SCDs in place       Neurological: She is alert and oriented to person, place, and time. She has normal reflexes.    Skin: Skin is warm and dry.    Psychiatric: She has a normal mood and affect.

## 2017-08-26 NOTE — PLAN OF CARE
Problem: Patient Care Overview  Goal: Plan of Care Review  Vital signs within parameters; denies pain, vag bleeding, rupture; ultrasound today

## 2017-08-27 PROCEDURE — 25000003 PHARM REV CODE 250: Performed by: OBSTETRICS & GYNECOLOGY

## 2017-08-27 PROCEDURE — 99231 SBSQ HOSP IP/OBS SF/LOW 25: CPT | Mod: ,,, | Performed by: OBSTETRICS & GYNECOLOGY

## 2017-08-27 PROCEDURE — 11000001 HC ACUTE MED/SURG PRIVATE ROOM

## 2017-08-27 RX ORDER — NIFEDIPINE 30 MG/1
60 TABLET, EXTENDED RELEASE ORAL DAILY
Status: DISCONTINUED | OUTPATIENT
Start: 2017-08-27 | End: 2017-09-12

## 2017-08-27 RX ORDER — NIFEDIPINE 30 MG/1
30 TABLET, EXTENDED RELEASE ORAL NIGHTLY
Status: DISCONTINUED | OUTPATIENT
Start: 2017-08-27 | End: 2017-09-12

## 2017-08-27 RX ADMIN — AMOXICILLIN 500 MG: 250 CAPSULE ORAL at 08:08

## 2017-08-27 RX ADMIN — PRENATAL VIT W/ FE FUMARATE-FA TAB 27-0.8 MG 1 EACH: 27-0.8 TAB at 09:08

## 2017-08-27 RX ADMIN — NIFEDIPINE 30 MG: 30 TABLET, FILM COATED, EXTENDED RELEASE ORAL at 08:08

## 2017-08-27 RX ADMIN — AMOXICILLIN 500 MG: 250 CAPSULE ORAL at 09:08

## 2017-08-27 RX ADMIN — NIFEDIPINE 60 MG: 30 TABLET, FILM COATED, EXTENDED RELEASE ORAL at 09:08

## 2017-08-27 NOTE — ASSESSMENT & PLAN NOTE
- EFW 379g, 1% on 8/22, AEDF on 8/25  - Fetal echo negative for VSD  - Work Up: CMV, parvo, toxoplasmosis --> pending  - Urine tox neg  - Verify FHT q shift  - UA dopplers twice weekly

## 2017-08-27 NOTE — SUBJECTIVE & OBJECTIVE
Obstetric HPI:  No issues this AM. Patient is without complaint. Denies HA, vision changes, SOB, RUQ pain. Denies ctx, VB, LOF. Good FM.     Objective:     Vital Signs (Most Recent):  Temp: 98.3 °F (36.8 °C) (08/27/17 0740)  Pulse: 82 (08/27/17 0740)  Resp: 18 (08/27/17 0740)  BP: 122/67 (08/27/17 0740)  SpO2: 100 % (08/27/17 0740) Vital Signs (24h Range):  Temp:  [96.9 °F (36.1 °C)-98.3 °F (36.8 °C)] 98.3 °F (36.8 °C)  Pulse:  [69-90] 82  Resp:  [16-19] 18  SpO2:  [93 %-100 %] 100 %  BP: (122-172)/(67-96) 122/67     Weight: 93 kg (205 lb)  Body mass index is 36.31 kg/m².    Intake/Output Summary (Last 24 hours) at 08/27/17 0912  Last data filed at 08/27/17 0615   Gross per 24 hour   Intake             1460 ml   Output             2250 ml   Net             -790 ml     Significant Labs:  Recent Lab Results     None        Physical Exam:   Constitutional: She is oriented to person, place, and time. She appears well-developed and well-nourished.    HENT:   Head: Normocephalic and atraumatic.     Neck: Normal range of motion.    Cardiovascular: Regular rhythm and normal heart sounds.     Pulmonary/Chest: Effort normal and breath sounds normal. No respiratory distress. She has no wheezes.        Abdominal: Soft. Bowel sounds are normal. She exhibits no distension. There is no tenderness.   Gravid 25w             Musculoskeletal: Normal range of motion and moves all extremeties.   UCHE/SCDs in place       Neurological: She is alert and oriented to person, place, and time. She has normal reflexes.    Skin: Skin is warm and dry.    Psychiatric: She has a normal mood and affect.

## 2017-08-27 NOTE — PLAN OF CARE
Problem: Patient Care Overview  Goal: Plan of Care Review  Outcome: Ongoing (interventions implemented as appropriate)  Pt doing well.  VS stable. No acute changes this shift. Pt denies LOF, vaginal bleeding,  and contractions.  Pt reports positive fetal movement.        Pt denies headaches, blurry vision, and epigastric pain.  Urinary output and reflexes WNL.

## 2017-08-27 NOTE — PROGRESS NOTES
[facility]  Obstetrics  Antepartum Progress Note    Patient Name: Myrtle Valdez  MRN: [id number]  Admission Date: 2017  Hospital Length of Stay: 5 days  Attending Physician: Martha Mcdermott MD  Primary Care Provider: Primary Doctor No    Subjective:     Principal Problem:Pre-eclampsia, severe, antepartum    HPI:  Myrtle Valdez is a 22 y.o. [id number] at 25w0d presents as a transport from [facility] secondary to PreE w/SF and FGR with AEDF.    This IUP is complicated by newly diagnosed PreE w/SF (severe range BP), fetal growth restriction (1%, AEDF, fetal hyperechoic bowel and possible VSD), asthma (well controlled, no meds), hx hyperthyroidism (no meds), questionable hx of HepC, hx of chlamydia.    Currently patient has no complaints. Patient denies headache, scotoma, RUQ pain, N/V, CP, SOB. Patient denies contractions, vaginal bleeding or discharge, denies LOF.   Fetal Movement: normal.     Hospital Course:  2017 - admitted to antepartum at 25w0d for newly diagnosed PreE w/SF in a pregnancy complicated by FGR (AEDF). BMZ series and Mag started at OSH. BP remained elevated, started on Procardia 30XL.  2017 - Doing well from PreE standpoint, asymptomatic, labs stable, BP now mild range.  2017 to present - Continues to be stable from PrE standpoint. Platelets stable, AST stable. Continue FHR dopplers q shift.        Obstetric HPI:  No issues this AM. Patient is without complaint. Denies HA, vision changes, SOB, RUQ pain. Denies ctx, VB, LOF. Good FM.     Objective:     Vital Signs (Most Recent):  Temp: 98.3 °F (36.8 °C) (17 0740)  Pulse: 82 (1740)  Resp: 18 (17)  BP: 122/67 (17 0740)  SpO2: 100 % (17) Vital Signs (24h Range):  Temp:  [96.9 °F (36.1 °C)-98.3 °F (36.8 °C)] 98.3 °F (36.8 °C)  Pulse:  [69-90] 82  Resp:  [16-19] 18  SpO2:  [93 %-100 %] 100 %  BP: (122-172)/(67-96) 122/67     Weight: 93 kg (205 lb)  Body mass index  is 36.31 kg/m².    Intake/Output Summary (Last 24 hours) at 17 0912  Last data filed at 17 0615   Gross per 24 hour   Intake             1460 ml   Output             2250 ml   Net             -790 ml     Significant Labs:  Recent Lab Results     None        Physical Exam:   Constitutional: She is oriented to person, place, and time. She appears well-developed and well-nourished.    HENT:   Head: Normocephalic and atraumatic.     Neck: Normal range of motion.    Cardiovascular: Regular rhythm and normal heart sounds.     Pulmonary/Chest: Effort normal and breath sounds normal. No respiratory distress. She has no wheezes.        Abdominal: Soft. Bowel sounds are normal. She exhibits no distension. There is no tenderness.   Gravid 25w             Musculoskeletal: Normal range of motion and moves all extremeties.   UCHE/SCDs in place       Neurological: She is alert and oriented to person, place, and time. She has normal reflexes.    Skin: Skin is warm and dry.    Psychiatric: She has a normal mood and affect.       Assessment/Plan:     22 y.o. female  at 25w5d for:    Tachycardia    - EKG ordered  - asymptomatic  - Transient and resolves spontaneously          25 weeks gestation of pregnancy    - PNV  - verify FHT q shift  - Will not do continuous monitoring at this time.         History of hepatitis C    - Hep C viral load negative  - hepatic function panel normal        Hyperthyroidism    - no meds  - TSH low, FT4 normal  - thyroid antibodies negative        Hx of chlamydia infection    - GC/CT  was negative        Intrauterine growth restriction affecting antepartum care of mother in second trimester    - EFW 379g, 1% on , AEDF on   - Fetal echo negative for VSD  - Work Up: CMV, parvo, toxoplasmosis --> pending  - Urine tox neg  - Verify FHT q shift  - UA dopplers twice weekly        Asthma    - well controlled, no meds  - continue to monitor        * Pre-eclampsia, severe, antepartum     Pre-Eclampsia w/ Severe Features (BP) at 25w complicated by FGR with AEDF  - Pre-E Labs stable - P/C: 5.44, AST: 21>25>16, Plt:240>322>310, Cr:0.5>0.6  - s/p magnesium  - s/p BMZ series  - BP controlled on Procardia 30XL BID                Martina Dobson MD  Obstetrics  [facility]

## 2017-08-27 NOTE — PLAN OF CARE
Problem: Patient Care Overview  Goal: Plan of Care Review  Outcome: Ongoing (interventions implemented as appropriate)  Pt denies pain, n/v, h/a, ctx, SBP in the 130's-140's, pt denies spots in vision or right epigastric pain, afebrile, FHT verified, denies vaginal bleeding, LOF, +FM, bed at lowest position, call light within reach, side rails up x2, mother to bedside, pt has no further questions, comments, or concerns at this time, report given to night shift.

## 2017-08-27 NOTE — ASSESSMENT & PLAN NOTE
Pre-Eclampsia w/ Severe Features (BP) at 25w complicated by FGR with AEDF  - Pre-E Labs stable - P/C: 5.44, AST: 21>25>16, Plt:240>322>310, Cr:0.5>0.6  - s/p magnesium  - s/p BMZ series  - BP controlled on Procardia 30XL BID

## 2017-08-28 LAB
AST SERPL-CCNC: 11 U/L
BASOPHILS # BLD AUTO: ABNORMAL K/UL
BASOPHILS NFR BLD: 0 %
CREAT SERPL-MCNC: 0.6 MG/DL
DIFFERENTIAL METHOD: ABNORMAL
EOSINOPHIL # BLD AUTO: ABNORMAL K/UL
EOSINOPHIL NFR BLD: 1 %
ERYTHROCYTE [DISTWIDTH] IN BLOOD BY AUTOMATED COUNT: 12.8 %
EST. GFR  (AFRICAN AMERICAN): >60 ML/MIN/1.73 M^2
EST. GFR  (NON AFRICAN AMERICAN): >60 ML/MIN/1.73 M^2
HCT VFR BLD AUTO: 38.5 %
HGB BLD-MCNC: 13 G/DL
LYMPHOCYTES # BLD AUTO: ABNORMAL K/UL
LYMPHOCYTES NFR BLD: 11 %
MCH RBC QN AUTO: 28.2 PG
MCHC RBC AUTO-ENTMCNC: 33.8 G/DL
MCV RBC AUTO: 84 FL
MONOCYTES # BLD AUTO: ABNORMAL K/UL
MONOCYTES NFR BLD: 5 %
NEUTROPHILS NFR BLD: 79 %
NEUTS BAND NFR BLD MANUAL: 4 %
PLATELET # BLD AUTO: 348 K/UL
PLATELET BLD QL SMEAR: ABNORMAL
PMV BLD AUTO: 10.8 FL
RBC # BLD AUTO: 4.61 M/UL
WBC # BLD AUTO: 26.73 K/UL

## 2017-08-28 PROCEDURE — 99233 SBSQ HOSP IP/OBS HIGH 50: CPT | Mod: 25,,, | Performed by: OBSTETRICS & GYNECOLOGY

## 2017-08-28 PROCEDURE — 11000001 HC ACUTE MED/SURG PRIVATE ROOM

## 2017-08-28 PROCEDURE — 84450 TRANSFERASE (AST) (SGOT): CPT

## 2017-08-28 PROCEDURE — 85027 COMPLETE CBC AUTOMATED: CPT

## 2017-08-28 PROCEDURE — 36415 COLL VENOUS BLD VENIPUNCTURE: CPT

## 2017-08-28 PROCEDURE — 25000003 PHARM REV CODE 250: Performed by: OBSTETRICS & GYNECOLOGY

## 2017-08-28 PROCEDURE — 25000003 PHARM REV CODE 250: Performed by: STUDENT IN AN ORGANIZED HEALTH CARE EDUCATION/TRAINING PROGRAM

## 2017-08-28 PROCEDURE — 85007 BL SMEAR W/DIFF WBC COUNT: CPT

## 2017-08-28 PROCEDURE — 82565 ASSAY OF CREATININE: CPT

## 2017-08-28 RX ORDER — BUTALBITAL, ACETAMINOPHEN AND CAFFEINE 50; 325; 40 MG/1; MG/1; MG/1
2 TABLET ORAL ONCE
Status: COMPLETED | OUTPATIENT
Start: 2017-08-28 | End: 2017-08-28

## 2017-08-28 RX ADMIN — PRENATAL VIT W/ FE FUMARATE-FA TAB 27-0.8 MG 1 EACH: 27-0.8 TAB at 09:08

## 2017-08-28 RX ADMIN — BUTALBITAL, ACETAMINOPHEN AND CAFFEINE 2 TABLET: 50; 325; 40 TABLET ORAL at 05:08

## 2017-08-28 RX ADMIN — AMOXICILLIN 500 MG: 250 CAPSULE ORAL at 09:08

## 2017-08-28 RX ADMIN — NIFEDIPINE 30 MG: 30 TABLET, FILM COATED, EXTENDED RELEASE ORAL at 08:08

## 2017-08-28 RX ADMIN — NIFEDIPINE 60 MG: 30 TABLET, FILM COATED, EXTENDED RELEASE ORAL at 09:08

## 2017-08-28 RX ADMIN — AMOXICILLIN 500 MG: 250 CAPSULE ORAL at 08:08

## 2017-08-28 NOTE — SUBJECTIVE & OBJECTIVE
Obstetric HPI:  No issues this AM. Patient is without complaint. HA 5/10 this morning relieved by Fioricet. Denies vision changes, SOB, RUQ pain. Denies ctx, VB, LOF. Good FM.     Objective:     Vital Signs (Most Recent):  Temp: 97.8 °F (36.6 °C) (08/28/17 0359)  Pulse: 96 (08/28/17 0509)  Resp: 16 (08/28/17 0359)  BP: (!) 142/85 (08/28/17 0509)  SpO2: 96 % (08/28/17 0359) Vital Signs (24h Range):  Temp:  [96.9 °F (36.1 °C)-98.3 °F (36.8 °C)] 97.8 °F (36.6 °C)  Pulse:  [] 96  Resp:  [16-18] 16  SpO2:  [92 %-100 %] 96 %  BP: (122-142)/(67-85) 142/85     Weight: 93.4 kg (205 lb 14.6 oz)  Body mass index is 36.48 kg/m².    Intake/Output Summary (Last 24 hours) at 08/28/17 0648  Last data filed at 08/28/17 0600   Gross per 24 hour   Intake              500 ml   Output              950 ml   Net             -450 ml     Significant Labs:  Recent Lab Results       08/28/17  0500      AST 11     Creatinine 0.6     eGFR if  >60     eGFR if non  >60  Comment:  Calculation used to obtain the estimated glomerular filtration  rate (eGFR) is the CKD-EPI equation. Since race is unknown   in our information system, the eGFR values for   -American and Non--American patients are given   for each creatinine result.       Hematocrit 38.5     Hemoglobin 13.0     MCH 28.2     MCHC 33.8     MCV 84     MPV 10.8     Platelets 348     RBC 4.61     RDW 12.8     WBC 26.73(H)         Physical Exam:   Constitutional: She is oriented to person, place, and time. She appears well-developed and well-nourished.    HENT:   Head: Normocephalic and atraumatic.     Neck: Normal range of motion.    Cardiovascular: Regular rhythm and normal heart sounds.     Pulmonary/Chest: Effort normal and breath sounds normal. No respiratory distress. She has no wheezes.        Abdominal: Soft. Bowel sounds are normal. She exhibits no distension. There is no tenderness.   Gravid 25w             Musculoskeletal: Normal  range of motion and moves all extremeties.   UCHE/SCDs in place       Neurological: She is alert and oriented to person, place, and time. She has normal reflexes.    Skin: Skin is warm and dry. She is not diaphoretic.    Psychiatric: She has a normal mood and affect.

## 2017-08-28 NOTE — ASSESSMENT & PLAN NOTE
- EFW 379g, 1% on 8/22, AEDF on 8/25  - Fetal echo negative for VSD  - Work Up: CMV, parvo, toxoplasmosis -->negative  - Urine tox neg  - Verify FHT q shift  - UA dopplers twice weekly

## 2017-08-28 NOTE — PROGRESS NOTES
[facility]  Obstetrics  Antepartum Progress Note    Patient Name: Myrtle Valdez  MRN: [id number]  Admission Date: 2017  Hospital Length of Stay: 6 days  Attending Physician: Martha Mcdermott MD  Primary Care Provider: Primary Doctor No    Subjective:     Principal Problem:Pre-eclampsia, severe, antepartum    HPI:  Myrtle Valdez is a 22 y.o. [id number] at 25w0d presents as a transport from [facility] secondary to PreE w/SF and FGR with AEDF.    This IUP is complicated by newly diagnosed PreE w/SF (severe range BP), fetal growth restriction (1%, AEDF, fetal hyperechoic bowel and possible VSD), asthma (well controlled, no meds), hx hyperthyroidism (no meds), questionable hx of HepC, hx of chlamydia.    Currently patient has no complaints. Patient denies headache, scotoma, RUQ pain, N/V, CP, SOB. Patient denies contractions, vaginal bleeding or discharge, denies LOF.   Fetal Movement: normal.     Hospital Course:  2017 - admitted to antepartum at 25w0d for newly diagnosed PreE w/SF in a pregnancy complicated by FGR (AEDF). BMZ series and Mag started at OSH. BP remained elevated, started on Procardia 30XL.  2017 - Doing well from PreE standpoint, asymptomatic, labs stable, BP now mild range.  2017 - - Continues to be stable from PrE standpoint. Platelets stable, AST stable. Continue FHR dopplers q shift.  HD7: BP regimen changed to Procardia XL 60AM/30PM due to elevated PM BP. Labs stable.      Obstetric HPI:  No issues this AM. Patient is without complaint. HA 5/10 this morning relieved by Fioricet. Denies vision changes, SOB, RUQ pain. Denies ctx, VB, LOF. Good FM.     Objective:     Vital Signs (Most Recent):  Temp: 97.8 °F (36.6 °C) (17)  Pulse: 96 (17 050)  Resp: 16 (17)  BP: (!) 142/85 (17)  SpO2: 96 % (17) Vital Signs (24h Range):  Temp:  [96.9 °F (36.1 °C)-98.3 °F (36.8 °C)] 97.8 °F (36.6 °C)  Pulse:  []  96  Resp:  [16-18] 16  SpO2:  [92 %-100 %] 96 %  BP: (122-142)/(67-85) 142/85     Weight: 93.4 kg (205 lb 14.6 oz)  Body mass index is 36.48 kg/m².    Intake/Output Summary (Last 24 hours) at 17 0648  Last data filed at 17 0600   Gross per 24 hour   Intake              500 ml   Output              950 ml   Net             -450 ml     Significant Labs:  Recent Lab Results       17  0500      AST 11     Creatinine 0.6     eGFR if  >60     eGFR if non  >60  Comment:  Calculation used to obtain the estimated glomerular filtration  rate (eGFR) is the CKD-EPI equation. Since race is unknown   in our information system, the eGFR values for   -American and Non--American patients are given   for each creatinine result.       Hematocrit 38.5     Hemoglobin 13.0     MCH 28.2     MCHC 33.8     MCV 84     MPV 10.8     Platelets 348     RBC 4.61     RDW 12.8     WBC 26.73(H)         Physical Exam:   Constitutional: She is oriented to person, place, and time. She appears well-developed and well-nourished.    HENT:   Head: Normocephalic and atraumatic.     Neck: Normal range of motion.    Cardiovascular: Regular rhythm and normal heart sounds.     Pulmonary/Chest: Effort normal and breath sounds normal. No respiratory distress. She has no wheezes.        Abdominal: Soft. Bowel sounds are normal. She exhibits no distension. There is no tenderness.   Gravid 25w             Musculoskeletal: Normal range of motion and moves all extremeties.   UCHE/SCDs in place       Neurological: She is alert and oriented to person, place, and time. She has normal reflexes.    Skin: Skin is warm and dry. She is not diaphoretic.    Psychiatric: She has a normal mood and affect.       Assessment/Plan:     22 y.o. female  at 25w6d for:    Tachycardia    - EKG: sinus tach otherwise normal  - asymptomatic  - Transient and resolves spontaneously          25 weeks gestation of pregnancy     - PNV  - verify FHT q shift  - Will not do continuous monitoring at this time.         History of hepatitis C    - Hep C viral load negative  - hepatic function panel normal        Hyperthyroidism    - no meds  - TSH low, FT4 normal  - thyroid antibodies negative        Hx of chlamydia infection    - GC/CT 8/22 was negative        Intrauterine growth restriction affecting antepartum care of mother in second trimester    - EFW 379g, 1% on 8/22, AEDF on 8/25  - Fetal echo negative for VSD  - Work Up: CMV, parvo, toxoplasmosis -->negative  - Urine tox neg  - Verify FHT q shift  - UA dopplers twice weekly        Asthma    - well controlled, no meds  - continue to monitor        * Pre-eclampsia, severe, antepartum    Pre-Eclampsia w/ Severe Features (BP) at 25w complicated by FGR with AEDF  - Pre-E Labs stable - P/C: 5.44, AST: 21>25>16>11, Plt:240>322>310>348, Cr:0.5>0.6>0.6  - s/p magnesium  - s/p BMZ series  - BP controlled on Procardia XL 60AM/30PM                Ramiro Del Valle MD  Obstetrics  [facility]

## 2017-08-28 NOTE — ASSESSMENT & PLAN NOTE
Pre-Eclampsia w/ Severe Features (BP) at 25w complicated by FGR with AEDF  - Pre-E Labs stable - P/C: 5.44, AST: 21>25>16>11, Plt:240>322>310>348, Cr:0.5>0.6>0.6  - s/p magnesium  - s/p BMZ series  - BP controlled on Procardia XL 60AM/30PM

## 2017-08-29 LAB — CMV IGM TITR SERPL: <8 U/ML

## 2017-08-29 PROCEDURE — 25000003 PHARM REV CODE 250: Performed by: OBSTETRICS & GYNECOLOGY

## 2017-08-29 PROCEDURE — 59025 FETAL NON-STRESS TEST: CPT

## 2017-08-29 PROCEDURE — 11000001 HC ACUTE MED/SURG PRIVATE ROOM

## 2017-08-29 PROCEDURE — 99233 SBSQ HOSP IP/OBS HIGH 50: CPT | Mod: 25,,, | Performed by: OBSTETRICS & GYNECOLOGY

## 2017-08-29 PROCEDURE — 76820 UMBILICAL ARTERY ECHO: CPT | Mod: 26,59,, | Performed by: OBSTETRICS & GYNECOLOGY

## 2017-08-29 PROCEDURE — 59025 FETAL NON-STRESS TEST: CPT | Mod: 26,59,, | Performed by: OBSTETRICS & GYNECOLOGY

## 2017-08-29 PROCEDURE — 76815 OB US LIMITED FETUS(S): CPT | Mod: 26,,, | Performed by: OBSTETRICS & GYNECOLOGY

## 2017-08-29 RX ADMIN — AMOXICILLIN 500 MG: 250 CAPSULE ORAL at 09:08

## 2017-08-29 RX ADMIN — NIFEDIPINE 30 MG: 30 TABLET, FILM COATED, EXTENDED RELEASE ORAL at 09:08

## 2017-08-29 RX ADMIN — NIFEDIPINE 60 MG: 30 TABLET, FILM COATED, EXTENDED RELEASE ORAL at 09:08

## 2017-08-29 RX ADMIN — PRENATAL VIT W/ FE FUMARATE-FA TAB 27-0.8 MG 1 EACH: 27-0.8 TAB at 09:08

## 2017-08-29 NOTE — ASSESSMENT & PLAN NOTE
- EFW 379g, 1% on 8/22, AEDF on 8/25  - Fetal echo negative for VSD  - Work Up: CMV, parvo, toxoplasmosis -->negative  - Urine tox neg  - NST BID. Patient understands risks of FHR monitoring including early delivery  - UA dopplers twice weekly

## 2017-08-29 NOTE — PROGRESS NOTES
[facility]  Obstetrics  Antepartum Progress Note    Patient Name: Myrtle Valdez  MRN: [id number]  Admission Date: 2017  Hospital Length of Stay: 7 days  Attending Physician: Martha Mcdermott MD  Primary Care Provider: Primary Doctor No    Subjective:     Principal Problem:Pre-eclampsia, severe, antepartum    HPI:  Myrtle Valdez is a 22 y.o. [id number] at 25w0d [place] a [place] secondary to PreE w/SF and FGR with AEDF.    This IUP is complicated by newly diagnosed PreE w/SF (severe range BP), fetal growth restriction (1%, AEDF, fetal hyperechoic bowel and possible VSD), asthma (well controlled, no meds), hx hyperthyroidism (no meds), questionable hx of HepC, hx of chlamydia.    Currently patient has no complaints. Patient denies headache, scotoma, RUQ pain, N/V, CP, SOB. Patient denies contractions, vaginal bleeding or discharge, denies LOF.   Fetal Movement: normal.     Hospital Course:  2017 - admitted to antepartum at 25w0d for newly diagnosed PreE w/SF in a pregnancy complicated by FGR (AEDF). BMZ series and Mag started at OSH. BP remained elevated, started on Procardia 30XL.  2017 - Doing well from PreE standpoint, asymptomatic, labs stable, BP now mild range.  2017 - - Continues to be stable from PrE standpoint. Platelets stable, AST stable. Continue FHR dopplers q shift.  HD7-8: BP regimen changed to Procardia XL 60AM/30PM due to elevated PM BP. Labs stable.      Obstetric HPI:  No issues this AM. Patient is without complaint. Denies vision changes, SOB, RUQ pain. Denies ctx, VB, LOF. Good FM.     Objective:     Vital Signs (Most Recent):  Temp: 98.7 °F (37.1 °C) (17 05)  Pulse: 90 (17)  Resp: 18 (17)  BP: 117/71 (17 05)  SpO2: 98 % (17 2340) Vital Signs (24h Range):  Temp:  [97.3 °F (36.3 °C)-98.7 °F (37.1 °C)] 98.7 °F (37.1 °C)  Pulse:  [] 90  Resp:  [16-18] 18  SpO2:  [89 %-100 %]  98 %  BP: (117-159)/(66-98) 117/71     Weight: 93.4 kg (205 lb 14.6 oz)  Body mass index is 36.48 kg/m².    Intake/Output Summary (Last 24 hours) at 17 0713  Last data filed at 17 0500   Gross per 24 hour   Intake             1560 ml   Output             2875 ml   Net            -1315 ml     Significant Labs:  Recent Lab Results     None        Physical Exam:   Constitutional: She is oriented to person, place, and time. She appears well-developed and well-nourished.    HENT:   Head: Normocephalic and atraumatic.     Neck: Normal range of motion.    Cardiovascular: Regular rhythm and normal heart sounds.     Pulmonary/Chest: Effort normal and breath sounds normal. No respiratory distress. She has no wheezes.        Abdominal: Soft. Bowel sounds are normal. She exhibits no distension. There is no tenderness.   Gravid 26w             Musculoskeletal: Normal range of motion and moves all extremeties.   UCHE/SCDs in place       Neurological: She is alert and oriented to person, place, and time. She has normal reflexes.    Skin: Skin is warm and dry. She is not diaphoretic.    Psychiatric: She has a normal mood and affect.       Assessment/Plan:     22 y.o. female  at 26w0d for:    Tachycardia    - EKG: sinus tach otherwise normal  - asymptomatic  - Transient and resolves spontaneously          25 weeks gestation of pregnancy    - PNV  - NST BID        History of hepatitis C    - Hep C viral load negative  - hepatic function panel normal        Hyperthyroidism    - no meds  - TSH low, FT4 normal  - thyroid antibodies negative        Hx of chlamydia infection    - GC/CT  was negative        Intrauterine growth restriction affecting antepartum care of mother in second trimester    - EFW 379g, 1% on , AEDF on   - Fetal echo negative for VSD  - Work Up: CMV, parvo, toxoplasmosis -->negative  - Urine tox neg  - NST BID. Patient understands risks of FHR monitoring including early delivery  - UA  dopplers twice weekly        Asthma    - well controlled, no meds  - continue to monitor        * Pre-eclampsia, severe, antepartum    Pre-Eclampsia w/ Severe Features (BP) at 25w complicated by FGR with AEDF  - Pre-E Labs stable - P/C: 5.44, AST: 21>25>16>11, Plt:240>322>310>348, Cr:0.5>0.6>0.6  - s/p magnesium  - s/p BMZ series  - BP controlled on Procardia XL 60AM/30PM                Ramiro Del Valle MD  Obstetrics  [facility]

## 2017-08-29 NOTE — SUBJECTIVE & OBJECTIVE
Obstetric HPI:  No issues this AM. Patient is without complaint. Denies vision changes, SOB, RUQ pain. Denies ctx, VB, LOF. Good FM.     Objective:     Vital Signs (Most Recent):  Temp: 98.7 °F (37.1 °C) (08/29/17 0516)  Pulse: 90 (08/29/17 0516)  Resp: 18 (08/29/17 0516)  BP: 117/71 (08/29/17 0516)  SpO2: 98 % (08/28/17 2340) Vital Signs (24h Range):  Temp:  [97.3 °F (36.3 °C)-98.7 °F (37.1 °C)] 98.7 °F (37.1 °C)  Pulse:  [] 90  Resp:  [16-18] 18  SpO2:  [89 %-100 %] 98 %  BP: (117-159)/(66-98) 117/71     Weight: 93.4 kg (205 lb 14.6 oz)  Body mass index is 36.48 kg/m².    Intake/Output Summary (Last 24 hours) at 08/29/17 0713  Last data filed at 08/29/17 0500   Gross per 24 hour   Intake             1560 ml   Output             2875 ml   Net            -1315 ml     Significant Labs:  Recent Lab Results     None        Physical Exam:   Constitutional: She is oriented to person, place, and time. She appears well-developed and well-nourished.    HENT:   Head: Normocephalic and atraumatic.     Neck: Normal range of motion.    Cardiovascular: Regular rhythm and normal heart sounds.     Pulmonary/Chest: Effort normal and breath sounds normal. No respiratory distress. She has no wheezes.        Abdominal: Soft. Bowel sounds are normal. She exhibits no distension. There is no tenderness.   Gravid 26w             Musculoskeletal: Normal range of motion and moves all extremeties.   UCHE/SCDs in place       Neurological: She is alert and oriented to person, place, and time. She has normal reflexes.    Skin: Skin is warm and dry. She is not diaphoretic.    Psychiatric: She has a normal mood and affect.

## 2017-08-29 NOTE — PLAN OF CARE
Problem: Patient Care Overview  Goal: Plan of Care Review  Pt stable through the night. VS WNL. Pt reports +FM, no lof, no cramping or contractions, and no vb. Patient denies HA, epigastric pain, and or visual disturbances.

## 2017-08-29 NOTE — PLAN OF CARE
SW was finally able to meet with pt and her mom today.    Education on the role of  was provided. Emotional support provided throughout visit. Pt is having a hard time accepting that her baby will likely be premature and require a NICU admission if baby survives. Pt mom reported they are having sandy in God that everything will be ok. Pt's mom asked realistic questions about a NICU admission: visitors, lodging, length of stay. All questions were answered up to this point.     Encouraged pt and her mom to get into a routine during this hospital stay to help manage the lengthy anticipated stay. Pt has mostly laid in her bed and slept since admission.Brainstormed ways to get into a routine and maintain good mental health while in hospital: daily showers, have meals at table, wear own clothes and refill water pitcher. Informed pt it will be encouraged to walk around more and she could possibly get wheelchair privileges once medically cleared.     Plan  Weekly f/u visits  Possible NICU tour in future-pt declined tour today  PRN visits emotional support    Courtney Lafont, LCSW [facility]  [email or web address]    (phone) [phone] or  [place]. [phone]  (fax) [phone]

## 2017-08-30 LAB
T GONDII IGG SER QL IA: NORMAL
T GONDII IGG SERPL IA-ACNC: <5 IU/ML

## 2017-08-30 PROCEDURE — 11000001 HC ACUTE MED/SURG PRIVATE ROOM

## 2017-08-30 PROCEDURE — 59025 FETAL NON-STRESS TEST: CPT

## 2017-08-30 PROCEDURE — 99232 SBSQ HOSP IP/OBS MODERATE 35: CPT | Mod: 25,,, | Performed by: OBSTETRICS & GYNECOLOGY

## 2017-08-30 PROCEDURE — 59025 FETAL NON-STRESS TEST: CPT | Mod: 26,,, | Performed by: OBSTETRICS & GYNECOLOGY

## 2017-08-30 PROCEDURE — 25000003 PHARM REV CODE 250: Performed by: OBSTETRICS & GYNECOLOGY

## 2017-08-30 RX ADMIN — NIFEDIPINE 30 MG: 30 TABLET, FILM COATED, EXTENDED RELEASE ORAL at 09:08

## 2017-08-30 RX ADMIN — PRENATAL VIT W/ FE FUMARATE-FA TAB 27-0.8 MG 1 EACH: 27-0.8 TAB at 09:08

## 2017-08-30 RX ADMIN — AMOXICILLIN 500 MG: 250 CAPSULE ORAL at 09:08

## 2017-08-30 RX ADMIN — NIFEDIPINE 60 MG: 30 TABLET, FILM COATED, EXTENDED RELEASE ORAL at 09:08

## 2017-08-30 NOTE — PROGRESS NOTES
[facility]  Obstetrics  Antepartum Progress Note    Patient Name: Myrtle Valdez  MRN: [id number]  Admission Date: 2017  Hospital Length of Stay: 8 days  Attending Physician: Martha Mcdermott MD  Primary Care Provider: Primary Doctor No    Subjective:     Principal Problem:Pre-eclampsia, severe, antepartum    HPI:  Myrtle Valdez is a 22 y.o. [id number] at 25w0d presents as a transport from [facility] secondary to PreE w/SF and FGR with AEDF.    This IUP is complicated by newly diagnosed PreE w/SF (severe range BP), fetal growth restriction (1%, AEDF, fetal hyperechoic bowel and possible VSD), asthma (well controlled, no meds), hx hyperthyroidism (no meds), questionable hx of HepC, hx of chlamydia.    Currently patient has no complaints. Patient denies headache, scotoma, RUQ pain, N/V, CP, SOB. Patient denies contractions, vaginal bleeding or discharge, denies LOF.   Fetal Movement: normal.     Hospital Course:  2017 - admitted to antepartum at 25w0d for newly diagnosed PreE w/SF in a pregnancy complicated by FGR (AEDF). BMZ series and Mag started at OSH. BP remained elevated, started on Procardia 30XL.  2017 - Doing well from PreE standpoint, asymptomatic, labs stable, BP now mild range.  2017 - - Continues to be stable from PrE standpoint. Platelets stable, AST stable. Continue FHR dopplers q shift.  HD7-9: BP regimen changed to Procardia XL 60AM/30PM due to elevated PM BP. Labs stable. Umbilical artery flow remains absent on doppler.      Obstetric HPI:  No issues this AM. Patient is without complaint. Denies vision changes, SOB, RUQ pain. Denies ctx, VB, LOF. Good FM.     Objective:     Vital Signs (Most Recent):  Temp: 97.5 °F (36.4 °C) (17 0354)  Pulse: 83 (17 0354)  Resp: 16 (17 0354)  BP: 130/75 (17 0354)  SpO2: 100 % (17 1314) Vital Signs (24h Range):  Temp:  [96.9 °F (36.1 °C)-98.2 °F (36.8 °C)] 97.5 °F (36.4 °C)  Pulse:   [] 83  Resp:  [16-18] 16  SpO2:  [99 %-100 %] 100 %  BP: (125-137)/(75-95) 130/75     Weight: 93.4 kg (205 lb 14.6 oz)  Body mass index is 36.48 kg/m².    Intake/Output Summary (Last 24 hours) at 17 0634  Last data filed at 17 0632   Gross per 24 hour   Intake              450 ml   Output              900 ml   Net             -450 ml     Significant Labs:  Recent Lab Results     None        Physical Exam:   Constitutional: She is oriented to person, place, and time. She appears well-developed and well-nourished.    HENT:   Head: Normocephalic and atraumatic.     Neck: Normal range of motion.    Cardiovascular: Regular rhythm and normal heart sounds.     Pulmonary/Chest: Effort normal and breath sounds normal. No respiratory distress. She has no wheezes.        Abdominal: Soft. Bowel sounds are normal. She exhibits no distension. There is no tenderness.   Gravid 26w             Musculoskeletal: Normal range of motion and moves all extremeties.   UCHE/SCDs in place       Neurological: She is alert and oriented to person, place, and time. She has normal reflexes.    Skin: Skin is warm and dry. She is not diaphoretic.    Psychiatric: She has a normal mood and affect.       Assessment/Plan:     22 y.o. female  at 26w1d for:    Tachycardia    - EKG: sinus tach otherwise normal  - asymptomatic  - Transient and resolves spontaneously          25 weeks gestation of pregnancy    - PNV  - NST BID        History of hepatitis C    - Hep C viral load negative  - hepatic function panel normal        Hyperthyroidism    - no meds  - TSH low, FT4 normal  - thyroid antibodies negative        Hx of chlamydia infection    - GC/CT  was negative        Intrauterine growth restriction affecting antepartum care of mother in second trimester    - EFW 379g, 1% on , AEDF on   - Fetal echo negative for VSD  - Work Up: CMV, parvo, toxoplasmosis -->negative  - Urine tox neg  - NST BID. Patient understands risks  of FHR monitoring including early delivery  - UA dopplers twice weekly. Remains absent flow on 8/29.        Asthma    - well controlled, no meds  - continue to monitor        * Pre-eclampsia, severe, antepartum    Pre-Eclampsia w/ Severe Features (BP) at 25w complicated by FGR with AEDF  - Pre-E Labs stable - P/C: 5.44, AST: 21>25>16>11, Plt:240>322>310>348, Cr:0.5>0.6>0.6  - s/p magnesium  - s/p BMZ series  - BP controlled on Procardia XL 60AM/30PM                Ramiro Del Valle MD  Obstetrics  [facility]

## 2017-08-30 NOTE — PLAN OF CARE
Problem: Patient Care Overview  Goal: Plan of Care Review  Patient doing well through the night. Patient reports +FM, no lof, no vb, denies ctx. Patient denies HA, visual disturbances and or epigastric pain. VS and UOP adequate. POC reviewed with pt, pt has no questions or needs at this time.

## 2017-08-30 NOTE — ASSESSMENT & PLAN NOTE
- EFW 379g, 1% on 8/22, AEDF on 8/25  - Fetal echo negative for VSD  - Work Up: CMV, parvo, toxoplasmosis -->negative  - Urine tox neg  - NST BID. Patient understands risks of FHR monitoring including early delivery  - UA dopplers twice weekly. Remains absent flow on 8/29.

## 2017-08-30 NOTE — PLAN OF CARE
Problem: Patient Care Overview  Goal: Plan of Care Review  Vital signs stable, afebrile; NSTs started today, viewed per MDs; ultrasound done today; denies rupture, vaginal bleeding, pain, cramping

## 2017-08-30 NOTE — SUBJECTIVE & OBJECTIVE
Obstetric HPI:  No issues this AM. Patient is without complaint. Denies vision changes, SOB, RUQ pain. Denies ctx, VB, LOF. Good FM.     Objective:     Vital Signs (Most Recent):  Temp: 97.5 °F (36.4 °C) (08/30/17 0354)  Pulse: 83 (08/30/17 0354)  Resp: 16 (08/30/17 0354)  BP: 130/75 (08/30/17 0354)  SpO2: 100 % (08/29/17 1314) Vital Signs (24h Range):  Temp:  [96.9 °F (36.1 °C)-98.2 °F (36.8 °C)] 97.5 °F (36.4 °C)  Pulse:  [] 83  Resp:  [16-18] 16  SpO2:  [99 %-100 %] 100 %  BP: (125-137)/(75-95) 130/75     Weight: 93.4 kg (205 lb 14.6 oz)  Body mass index is 36.48 kg/m².    Intake/Output Summary (Last 24 hours) at 08/30/17 0634  Last data filed at 08/30/17 0632   Gross per 24 hour   Intake              450 ml   Output              900 ml   Net             -450 ml     Significant Labs:  Recent Lab Results     None        Physical Exam:   Constitutional: She is oriented to person, place, and time. She appears well-developed and well-nourished.    HENT:   Head: Normocephalic and atraumatic.     Neck: Normal range of motion.    Cardiovascular: Regular rhythm and normal heart sounds.     Pulmonary/Chest: Effort normal and breath sounds normal. No respiratory distress. She has no wheezes.        Abdominal: Soft. Bowel sounds are normal. She exhibits no distension. There is no tenderness.   Gravid 26w             Musculoskeletal: Normal range of motion and moves all extremeties.   UCHE/SCDs in place       Neurological: She is alert and oriented to person, place, and time. She has normal reflexes.    Skin: Skin is warm and dry. She is not diaphoretic.    Psychiatric: She has a normal mood and affect.

## 2017-08-31 PROCEDURE — 11000001 HC ACUTE MED/SURG PRIVATE ROOM

## 2017-08-31 PROCEDURE — 76815 OB US LIMITED FETUS(S): CPT | Mod: 26,,, | Performed by: OBSTETRICS & GYNECOLOGY

## 2017-08-31 PROCEDURE — 25000003 PHARM REV CODE 250: Performed by: OBSTETRICS & GYNECOLOGY

## 2017-08-31 PROCEDURE — 99232 SBSQ HOSP IP/OBS MODERATE 35: CPT | Mod: 25,,, | Performed by: OBSTETRICS & GYNECOLOGY

## 2017-08-31 PROCEDURE — 59025 FETAL NON-STRESS TEST: CPT | Mod: 26,,, | Performed by: OBSTETRICS & GYNECOLOGY

## 2017-08-31 PROCEDURE — 76820 UMBILICAL ARTERY ECHO: CPT | Mod: 26,,, | Performed by: OBSTETRICS & GYNECOLOGY

## 2017-08-31 RX ADMIN — AMOXICILLIN 500 MG: 250 CAPSULE ORAL at 08:08

## 2017-08-31 RX ADMIN — NIFEDIPINE 60 MG: 30 TABLET, FILM COATED, EXTENDED RELEASE ORAL at 08:08

## 2017-08-31 RX ADMIN — NIFEDIPINE 30 MG: 30 TABLET, FILM COATED, EXTENDED RELEASE ORAL at 09:08

## 2017-08-31 RX ADMIN — PRENATAL VIT W/ FE FUMARATE-FA TAB 27-0.8 MG 1 EACH: 27-0.8 TAB at 08:08

## 2017-08-31 NOTE — PROGRESS NOTES
[facility]  Obstetrics  Antepartum Progress Note    Patient Name: Myrtle Valdez  MRN: [id number]  Admission Date: 2017  Hospital Length of Stay: 9 days  Attending Physician: Martha Mcdermott MD  Primary Care Provider: Primary Doctor No    Subjective:     Principal Problem:Pre-eclampsia, severe, antepartum    HPI:  Myrtle Valdez is a 22 y.o. [id number] at 25w0d presents as a transport from [facility] secondary to PreE w/SF and FGR with AEDF.    This IUP is complicated by newly diagnosed PreE w/SF (severe range BP), fetal growth restriction (1%, AEDF, fetal hyperechoic bowel and possible VSD), asthma (well controlled, no meds), hx hyperthyroidism (no meds), questionable hx of HepC, hx of chlamydia.    Currently patient has no complaints. Patient denies headache, scotoma, RUQ pain, N/V, CP, SOB. Patient denies contractions, vaginal bleeding or discharge, denies LOF.   Fetal Movement: normal.     Hospital Course:  2017 - admitted to antepartum at 25w0d for newly diagnosed PreE w/SF in a pregnancy complicated by FGR (AEDF). BMZ series and Mag started at OSH. BP remained elevated, started on Procardia 30XL.  2017 - Doing well from PreE standpoint, asymptomatic, labs stable, BP now mild range.  2017 - - Continues to be stable from PrE standpoint. Platelets stable, AST stable. Continue FHR dopplers q shift.  HD7-10: BP regimen changed to Procardia XL 60AM/30PM due to elevated PM BP. Labs stable. Umbilical artery flow remains absent on doppler.      Obstetric HPI:  No issues this AM. Patient is without complaint. Denies vision changes, SOB, RUQ pain. Denies ctx, VB, LOF. Good FM.     Objective:     Vital Signs (Most Recent):  Temp: 98.6 °F (37 °C) (17 0440)  Pulse: 72 (17 0440)  Resp: 16 (17 0440)  BP: 112/60 (17 0440)  SpO2: 100 % (17 1315) Vital Signs (24h Range):  Temp:  [97 °F (36.1 °C)-98.6 °F (37 °C)] 98.6 °F (37 °C)  Pulse:  [72-86]  72  Resp:  [16-18] 16  SpO2:  [99 %-100 %] 100 %  BP: (112-151)/(60-90) 112/60     Weight: 93.4 kg (205 lb 14.6 oz)  Body mass index is 36.48 kg/m².    Intake/Output Summary (Last 24 hours) at 17 0657  Last data filed at 17 0624   Gross per 24 hour   Intake              600 ml   Output             1250 ml   Net             -650 ml     Significant Labs:  Recent Lab Results     None        FHT: reassuring, 145bpm, mod BTB variability, - accels, - decels  TOCO: no contractions      Physical Exam:   Constitutional: She is oriented to person, place, and time. She appears well-developed and well-nourished.    HENT:   Head: Normocephalic and atraumatic.     Neck: Normal range of motion.    Cardiovascular: Regular rhythm and normal heart sounds.     Pulmonary/Chest: Effort normal and breath sounds normal. No respiratory distress. She has no wheezes.        Abdominal: Soft. Bowel sounds are normal. She exhibits no distension. There is no tenderness.   Gravid 26w             Musculoskeletal: Normal range of motion and moves all extremeties.   UCHE/SCDs in place       Neurological: She is alert and oriented to person, place, and time. She has normal reflexes.    Skin: Skin is warm and dry. She is not diaphoretic.    Psychiatric: She has a normal mood and affect.       Assessment/Plan:     22 y.o. female  at 26w2d for:    Tachycardia    - EKG: sinus tach otherwise normal  - asymptomatic  - Transient and resolves spontaneously          25 weeks gestation of pregnancy    - PNV  - NST BID        History of hepatitis C    - Hep C viral load negative  - hepatic function panel normal        Hyperthyroidism    - no meds  - TSH low, FT4 normal  - thyroid antibodies negative        Hx of chlamydia infection    - GC/CT  was negative        Intrauterine growth restriction affecting antepartum care of mother in second trimester    - EFW 379g, 1% on , AEDF on . Repeat growth scan next week.  - Fetal echo  negative for VSD  - Work Up: CMV, parvo, toxoplasmosis -->negative  - Urine tox neg  - NST BID. Patient understands risks of FHR monitoring including early delivery  - UA dopplers twice weekly. Remains absent flow on 8/29.        Asthma    - well controlled, no meds  - continue to monitor        * Pre-eclampsia, severe, antepartum    Pre-Eclampsia w/ Severe Features (BP) at 25w complicated by FGR with AEDF  - Pre-E Labs stable - P/C: 5.44, AST: 21>25>16>11, Plt:240>322>310>348, Cr:0.5>0.6>0.6  - Repeat labs on 9/5  - s/p magnesium  - s/p BMZ series  - BP controlled on Procardia XL 60AM/30PM                Ramiro Del Valle MD  Obstetrics  [facility]

## 2017-08-31 NOTE — PLAN OF CARE
Problem: Patient Care Overview  Goal: Plan of Care Review  Vital signs stable, afebrile; NST completed x 2; viewed by  and reviewed by ; denies preeclampsia symptoms

## 2017-08-31 NOTE — ASSESSMENT & PLAN NOTE
Pre-Eclampsia w/ Severe Features (BP) at 25w complicated by FGR with AEDF  - Pre-E Labs stable - P/C: 5.44, AST: 21>25>16>11, Plt:240>322>310>348, Cr:0.5>0.6>0.6  - Repeat labs on 9/5  - s/p magnesium  - s/p BMZ series  - BP controlled on Procardia XL 60AM/30PM

## 2017-08-31 NOTE — ASSESSMENT & PLAN NOTE
- EFW 379g, 1% on 8/22, AEDF on 8/25. Repeat growth scan next week.  - Fetal echo negative for VSD  - Work Up: CMV, parvo, toxoplasmosis -->negative  - Urine tox neg  - NST BID. Patient understands risks of FHR monitoring including early delivery  - UA dopplers twice weekly. Remains absent flow on 8/29.

## 2017-08-31 NOTE — SUBJECTIVE & OBJECTIVE
Obstetric HPI:  No issues this AM. Patient is without complaint. Denies vision changes, SOB, RUQ pain. Denies ctx, VB, LOF. Good FM.     Objective:     Vital Signs (Most Recent):  Temp: 98.6 °F (37 °C) (08/31/17 0440)  Pulse: 72 (08/31/17 0440)  Resp: 16 (08/31/17 0440)  BP: 112/60 (08/31/17 0440)  SpO2: 100 % (08/30/17 1315) Vital Signs (24h Range):  Temp:  [97 °F (36.1 °C)-98.6 °F (37 °C)] 98.6 °F (37 °C)  Pulse:  [72-86] 72  Resp:  [16-18] 16  SpO2:  [99 %-100 %] 100 %  BP: (112-151)/(60-90) 112/60     Weight: 93.4 kg (205 lb 14.6 oz)  Body mass index is 36.48 kg/m².    Intake/Output Summary (Last 24 hours) at 08/31/17 0657  Last data filed at 08/31/17 0624   Gross per 24 hour   Intake              600 ml   Output             1250 ml   Net             -650 ml     Significant Labs:  Recent Lab Results     None        FHT: reassuring, 145bpm, mod BTB variability, - accels, - decels  TOCO: no contractions      Physical Exam:   Constitutional: She is oriented to person, place, and time. She appears well-developed and well-nourished.    HENT:   Head: Normocephalic and atraumatic.     Neck: Normal range of motion.    Cardiovascular: Regular rhythm and normal heart sounds.     Pulmonary/Chest: Effort normal and breath sounds normal. No respiratory distress. She has no wheezes.        Abdominal: Soft. Bowel sounds are normal. She exhibits no distension. There is no tenderness.   Gravid 26w             Musculoskeletal: Normal range of motion and moves all extremeties.   UCHE/SCDs in place       Neurological: She is alert and oriented to person, place, and time. She has normal reflexes.    Skin: Skin is warm and dry. She is not diaphoretic.    Psychiatric: She has a normal mood and affect.

## 2017-09-01 PROBLEM — Z3A.26 26 WEEKS GESTATION OF PREGNANCY: Status: ACTIVE | Noted: 2017-08-22

## 2017-09-01 LAB — GLUCOSE SERPL-MCNC: 169 MG/DL

## 2017-09-01 PROCEDURE — 82950 GLUCOSE TEST: CPT

## 2017-09-01 PROCEDURE — 11000001 HC ACUTE MED/SURG PRIVATE ROOM

## 2017-09-01 PROCEDURE — 25000003 PHARM REV CODE 250: Performed by: OBSTETRICS & GYNECOLOGY

## 2017-09-01 PROCEDURE — 97802 MEDICAL NUTRITION INDIV IN: CPT

## 2017-09-01 PROCEDURE — 36415 COLL VENOUS BLD VENIPUNCTURE: CPT

## 2017-09-01 RX ADMIN — NIFEDIPINE 30 MG: 30 TABLET, FILM COATED, EXTENDED RELEASE ORAL at 09:09

## 2017-09-01 RX ADMIN — PRENATAL VIT W/ FE FUMARATE-FA TAB 27-0.8 MG 1 EACH: 27-0.8 TAB at 09:09

## 2017-09-01 RX ADMIN — NIFEDIPINE 60 MG: 30 TABLET, FILM COATED, EXTENDED RELEASE ORAL at 09:09

## 2017-09-01 NOTE — PLAN OF CARE
NICU tour completed with pt and her mom. All questions regarding a possible NICU admission were answered. Pt was tearful at times during the tour but reported she was happy she decided to see the NICU.     Will cont to f/u.    Courtney Lafont, LCSW [facility]  [email or web address]    (phone) [phone] or  [place]. [phone]  (fax) [phone]

## 2017-09-01 NOTE — SUBJECTIVE & OBJECTIVE
Obstetric HPI:  No issues this AM. Patient is without complaint. Denies vision changes, SOB, RUQ pain. Denies ctx, VB, LOF. Good FM.     Objective:     Vital Signs (Most Recent):  Temp: 98.2 °F (36.8 °C) (09/01/17 0456)  Pulse: 81 (09/01/17 0456)  Resp: 18 (09/01/17 0456)  BP: 132/77 (09/01/17 0455)  SpO2: 100 % (09/01/17 0456) Vital Signs (24h Range):  Temp:  [96.8 °F (36 °C)-98.5 °F (36.9 °C)] 98.2 °F (36.8 °C)  Pulse:  [] 81  Resp:  [16-18] 18  SpO2:  [99 %-100 %] 100 %  BP: (125-138)/(60-82) 132/77     Weight: 93.4 kg (205 lb 14.6 oz)  Body mass index is 36.48 kg/m².    Intake/Output Summary (Last 24 hours) at 09/01/17 0641  Last data filed at 08/31/17 1700   Gross per 24 hour   Intake              850 ml   Output             1250 ml   Net             -400 ml     Significant Labs:  Recent Lab Results     None        FHT: reassuring, 145bpm, mod BTB variability, - accels, - decels  TOCO: no contractions      Physical Exam:   Constitutional: She is oriented to person, place, and time. She appears well-developed and well-nourished.    HENT:   Head: Normocephalic and atraumatic.     Neck: Normal range of motion.    Cardiovascular: Regular rhythm and normal heart sounds.     Pulmonary/Chest: Effort normal and breath sounds normal. No respiratory distress. She has no wheezes.        Abdominal: Soft. Bowel sounds are normal. She exhibits no distension. There is no tenderness.   Gravid 26w             Musculoskeletal: Normal range of motion and moves all extremeties.   UCHE/SCDs in place       Neurological: She is alert and oriented to person, place, and time. She has normal reflexes.    Skin: Skin is warm and dry. She is not diaphoretic.    Psychiatric: She has a normal mood and affect.

## 2017-09-01 NOTE — PLAN OF CARE
Problem: Patient Care Overview  Goal: Plan of Care Review  Outcome: Ongoing (interventions implemented as appropriate)  No acute events today.  NST completed BID.  Pt completed OB glucose screen; result was abnormal (169 mg/dL).  Dr. Del Valle notified; pt will go for 3 hour screen eventually.  VS stable.  I/O.  Plan of care discussed with pt and family, who have been at the bedside.  Will continue to monitor.

## 2017-09-01 NOTE — PLAN OF CARE
Problem: Patient Care Overview  Goal: Plan of Care Review  Outcome: Ongoing (interventions implemented as appropriate)  VSS. Pt denies HA, vision changes, and RUQ pain. Endorses + FM. Denies pain, ctx, LOF, and vaginal bleeding. No questions or concerns at this time.

## 2017-09-01 NOTE — PROGRESS NOTES
[facility]  Obstetrics  Antepartum Progress Note    Patient Name: Myrtle Valdez  MRN: [id number]  Admission Date: 2017  Hospital Length of Stay: 10 days  Attending Physician: Martha Mcdermott MD  Primary Care Provider: Primary Doctor No    Subjective:     Principal Problem:Pre-eclampsia, severe, antepartum    HPI:  Myrtle Valdez is a 22 y.o. [id number] at 25w0d presents as a transport from [facility] secondary to PreE w/SF and FGR with AEDF.    This IUP is complicated by newly diagnosed PreE w/SF (severe range BP), fetal growth restriction (1%, AEDF, fetal hyperechoic bowel and possible VSD), asthma (well controlled, no meds), hx hyperthyroidism (no meds), questionable hx of HepC, hx of chlamydia.    Currently patient has no complaints. Patient denies headache, scotoma, RUQ pain, N/V, CP, SOB. Patient denies contractions, vaginal bleeding or discharge, denies LOF.   Fetal Movement: normal.     Hospital Course:  2017 - admitted to antepartum at 25w0d for newly diagnosed PreE w/SF in a pregnancy complicated by FGR (AEDF). BMZ series and Mag started at OSH. BP remained elevated, started on Procardia 30XL.  2017 - Doing well from PreE standpoint, asymptomatic, labs stable, BP now mild range.  2017 - - Continues to be stable from PrE standpoint. Platelets stable, AST stable. Continue FHR dopplers q shift.  HD7-11: BP regimen changed to Procardia XL 60AM/30PM due to elevated PM BP. Labs stable. Umbilical artery flow remains absent on doppler.      Obstetric HPI:  No issues this AM. Patient is without complaint. Denies vision changes, SOB, RUQ pain. Denies ctx, VB, LOF. Good FM.     Objective:     Vital Signs (Most Recent):  Temp: 98.2 °F (36.8 °C) (17)  Pulse: 81 (17)  Resp: 18 (17)  BP: 132/77 (175)  SpO2: 100 % (17) Vital Signs (24h Range):  Temp:  [96.8 °F (36 °C)-98.5 °F (36.9 °C)] 98.2 °F (36.8 °C)  Pulse:   [] 81  Resp:  [16-18] 18  SpO2:  [99 %-100 %] 100 %  BP: (125-138)/(60-82) 132/77     Weight: 93.4 kg (205 lb 14.6 oz)  Body mass index is 36.48 kg/m².    Intake/Output Summary (Last 24 hours) at 17 0641  Last data filed at 17 1700   Gross per 24 hour   Intake              850 ml   Output             1250 ml   Net             -400 ml     Significant Labs:  Recent Lab Results     None        FHT: reassuring, 145bpm, mod BTB variability, - accels, - decels  TOCO: no contractions      Physical Exam:   Constitutional: She is oriented to person, place, and time. She appears well-developed and well-nourished.    HENT:   Head: Normocephalic and atraumatic.     Neck: Normal range of motion.    Cardiovascular: Regular rhythm and normal heart sounds.     Pulmonary/Chest: Effort normal and breath sounds normal. No respiratory distress. She has no wheezes.        Abdominal: Soft. Bowel sounds are normal. She exhibits no distension. There is no tenderness.   Gravid 26w             Musculoskeletal: Normal range of motion and moves all extremeties.   UCHE/SCDs in place       Neurological: She is alert and oriented to person, place, and time. She has normal reflexes.    Skin: Skin is warm and dry. She is not diaphoretic.    Psychiatric: She has a normal mood and affect.       Assessment/Plan:     22 y.o. female  at 26w3d for:    Tachycardia    - EKG: sinus tach otherwise normal  - asymptomatic  - Transient and resolves spontaneously          26 weeks gestation of pregnancy    - PNV  - NST BID        History of hepatitis C    - Hep C viral load negative  - hepatic function panel normal        Hyperthyroidism    - no meds  - TSH low, FT4 normal  - thyroid antibodies negative        Hx of chlamydia infection    - GC/CT  was negative        Intrauterine growth restriction affecting antepartum care of mother in second trimester    - EFW 379g, 1% on , AEDF on . Repeat growth scan next week.  - Fetal  echo negative for VSD  - Work Up: CMV, parvo, toxoplasmosis -->negative  - Urine tox neg  - NST BID. Patient understands risks of FHR monitoring including early delivery  - UA dopplers twice weekly. Remains absent flow on 8/31.        Asthma    - well controlled, no meds  - continue to monitor        * Pre-eclampsia, severe, antepartum    Pre-Eclampsia w/ Severe Features (BP) at 25w complicated by FGR with AEDF  - Pre-E Labs stable - P/C: 5.44, AST: 21>25>16>11, Plt:240>322>310>348, Cr:0.5>0.6>0.6  - Repeat labs on 9/5  - s/p magnesium  - s/p BMZ series  - BP controlled on Procardia XL 60AM/30PM                Ramiro Del Valle MD  Obstetrics  [facility]

## 2017-09-01 NOTE — PROGRESS NOTES
"[facility]  Adult Nutrition  Progress Note    SUMMARY     Recommendations    Recommendation/Intervention:   1. Continue regular diet   2. Continue prenatal mvi     Goals: Meet >85% EEN and EPN    Nutrition Goal Status: new  Communication of RD Recs: other (comment) (care plan)    Reason for Assessment    Reason for Assessment: length of stay  Diagnosis: pregnancy complications (Pre-eclampsia)  Relevent Medical History: thyroid disease, HTN, asthma      General Information Comments: Pt endorses good appetite, no nutritional issues at this time.    Nutrition Discharge Planning: d/c on regular diet    Nutrition Prescription Ordered    Current Diet Order: regular     Evaluation of Received Nutrients/Fluid Intake    % Intake of Estimated Energy Needs: 75 - 100 %  % Meal Intake: 100%     Nutrition Risk Screen     Nutrition Risk Screen: no indicators present    Nutrition/Diet History    Patient Reported Diet/Restrictions/Preferences: general  Food Preferences: No cultural/Yazidism preferences noted     Labs/Tests/Procedures/Meds    Pertinent Labs Reviewed: reviewed  Pertinent Labs Comments: unremarkable  Pertinent Medications Reviewed: reviewed  Pertinent Medications Comments: prenatal mvi    Physical Findings    Overall Physical Appearance: nourished  Oral/Mouth Cavity: WDL  Skin: intact    Anthropometrics    Temp: 97 °F (36.1 °C)     Height: 5' 3" (160 cm)  Weight Method: Stated  Weight: 93 kg (205 lb)     Ideal Body Weight (IBW), Female: 115 lb     % Ideal Body Weight, Female (lb): 178.26 lb  BMI (Calculated): 36.4  BMI Grade: 35 - 39.9 - obesity - grade II     Estimated/Assessed Needs    Weight Used For Calorie Calculations: 93 kg (205 lb 0.4 oz)   Height (cm): 160 cm  Energy Calorie Requirements (kcal): 2330  Energy Need Method: Sebastian-St Jeor (stress factor 1.4)  RMR (Sebastian-St. Jeor Equation): 1664.12     Weight Used For Protein Calculations: 52.2 kg (115 lb) (IBW)  Protein Requirements: "  gm/d (1.2-1.5 gm/kg IBW)    Fluid Requirements (mL): 2330 (or per team)  Fluid Need Method: RDA Method    Assessment and Plan    No nutrition dx at this time    Monitor and Evaluation    Food and Nutrient Intake: energy intake, food and beverage intake  Food and Nutrient Adminstration: diet order  Anthropometric Measurements: weight, weight change  Biochemical Data, Medical Tests and Procedures: electrolyte and renal panel, gastrointestinal profile, glucose/endocrine profile, inflammatory profile  Nutrition-Focused Physical Findings: overall appearance    Nutrition Risk    Level of Risk: other (see comments) (f/u 1x/wk)    Nutrition Follow-Up    RD Follow-up?: Yes

## 2017-09-01 NOTE — PLAN OF CARE
Problem: Patient Care Overview  Goal: Plan of Care Review  Outcome: Ongoing (interventions implemented as appropriate)  Recommendation/Intervention:   1. Continue regular diet   2. Continue prenatal mvi      Goals: Meet >85% EEN and EPN

## 2017-09-01 NOTE — ASSESSMENT & PLAN NOTE
- EFW 379g, 1% on 8/22, AEDF on 8/25. Repeat growth scan next week.  - Fetal echo negative for VSD  - Work Up: CMV, parvo, toxoplasmosis -->negative  - Urine tox neg  - NST BID. Patient understands risks of FHR monitoring including early delivery  - UA dopplers twice weekly. Remains absent flow on 8/31.

## 2017-09-02 PROCEDURE — 25000003 PHARM REV CODE 250: Performed by: OBSTETRICS & GYNECOLOGY

## 2017-09-02 PROCEDURE — 99233 SBSQ HOSP IP/OBS HIGH 50: CPT | Mod: 25,,, | Performed by: OBSTETRICS & GYNECOLOGY

## 2017-09-02 PROCEDURE — 11000001 HC ACUTE MED/SURG PRIVATE ROOM

## 2017-09-02 PROCEDURE — 59025 FETAL NON-STRESS TEST: CPT

## 2017-09-02 PROCEDURE — 59025 FETAL NON-STRESS TEST: CPT | Mod: 26,,, | Performed by: OBSTETRICS & GYNECOLOGY

## 2017-09-02 RX ADMIN — NIFEDIPINE 30 MG: 30 TABLET, FILM COATED, EXTENDED RELEASE ORAL at 09:09

## 2017-09-02 RX ADMIN — PRENATAL VIT W/ FE FUMARATE-FA TAB 27-0.8 MG 1 EACH: 27-0.8 TAB at 09:09

## 2017-09-02 RX ADMIN — NIFEDIPINE 60 MG: 30 TABLET, FILM COATED, EXTENDED RELEASE ORAL at 09:09

## 2017-09-02 NOTE — SUBJECTIVE & OBJECTIVE
Obstetric HPI:  No issues this AM. Patient is without complaint. Denies vision changes, SOB, RUQ pain. Denies ctx, VB, LOF. Good FM.     Objective:     Vital Signs (Most Recent):  Temp: 97.5 °F (36.4 °C) (09/02/17 0337)  Pulse: 80 (09/02/17 0337)  Resp: 16 (09/02/17 0337)  BP: 116/62 (09/02/17 0337)  SpO2: 100 % (09/02/17 0337) Vital Signs (24h Range):  Temp:  [97 °F (36.1 °C)-98 °F (36.7 °C)] 97.5 °F (36.4 °C)  Pulse:  [] 80  Resp:  [16] 16  SpO2:  [99 %-100 %] 100 %  BP: (116-134)/(62-84) 116/62     Weight: 93 kg (205 lb)  Body mass index is 36.31 kg/m².    Intake/Output Summary (Last 24 hours) at 09/02/17 0855  Last data filed at 09/02/17 0300   Gross per 24 hour   Intake             1200 ml   Output             1420 ml   Net             -220 ml     Significant Labs:  Recent Lab Results       09/01/17  1418      OB Glucose Screen 169(H)         FHT: reassuring, 140bpm, mod BTB variability, - accels, - decels  TOCO: no contractions      Physical Exam:   Constitutional: She is oriented to person, place, and time. She appears well-developed and well-nourished.    HENT:   Head: Normocephalic and atraumatic.     Neck: Normal range of motion.    Cardiovascular: Regular rhythm and normal heart sounds.     Pulmonary/Chest: Effort normal and breath sounds normal. No respiratory distress. She has no wheezes.        Abdominal: Soft. Bowel sounds are normal. She exhibits no distension. There is no tenderness.   Gravid 26w             Musculoskeletal: Normal range of motion and moves all extremeties.   UCHE/SCDs in place       Neurological: She is alert and oriented to person, place, and time. She has normal reflexes.    Skin: Skin is warm and dry. She is not diaphoretic.    Psychiatric: She has a normal mood and affect.

## 2017-09-02 NOTE — PLAN OF CARE
Problem: Patient Care Overview  Goal: Plan of Care Review  Vital signs WDL; afebrile; denies preeclampsia symptoms; reports + fetal movement, denies rupture, vaginal bleeding, pain

## 2017-09-02 NOTE — PROGRESS NOTES
[facility]  Obstetrics  Antepartum Progress Note    Patient Name: Myrtle Valdez  MRN: [id number]  Admission Date: 2017  Hospital Length of Stay: 11 days  Attending Physician: Martha Mcdermott MD  Primary Care Provider: Primary Doctor No    Subjective:     Principal Problem:Pre-eclampsia, severe, antepartum    HPI:  Myrtle Valdez is a 22 y.o. [id number] at 25w0d presents as a transport from [facility] secondary to PreE w/SF and FGR with AEDF.    This IUP is complicated by newly diagnosed PreE w/SF (severe range BP), fetal growth restriction (1%, AEDF, fetal hyperechoic bowel and possible VSD), asthma (well controlled, no meds), hx hyperthyroidism (no meds), questionable hx of HepC, hx of chlamydia.    Currently patient has no complaints. Patient denies headache, scotoma, RUQ pain, N/V, CP, SOB. Patient denies contractions, vaginal bleeding or discharge, denies LOF.   Fetal Movement: normal.     Hospital Course:  2017 - admitted to antepartum at 25w0d for newly diagnosed PreE w/SF in a pregnancy complicated by FGR (AEDF). BMZ series and Mag started at OSH. BP remained elevated, started on Procardia 30XL.  2017 - Doing well from PreE standpoint, asymptomatic, labs stable, BP now mild range.  2017 - - Continues to be stable from PrE standpoint. Platelets stable, AST stable. Continue FHR dopplers q shift.  HD7-12: BP regimen changed to Procardia XL 60AM/30PM due to elevated PM BP. Labs stable. Umbilical artery flow remains absent on doppler.      Obstetric HPI:  No issues this AM. Patient is without complaint. Denies vision changes, SOB, RUQ pain. Denies ctx, VB, LOF. Good FM.     Objective:     Vital Signs (Most Recent):  Temp: 97.5 °F (36.4 °C) (17)  Pulse: 80 (17)  Resp: 16 (17)  BP: 116/62 (17)  SpO2: 100 % (17) Vital Signs (24h Range):  Temp:  [97 °F (36.1 °C)-98 °F (36.7 °C)] 97.5 °F (36.4 °C)  Pulse:   [] 80  Resp:  [16] 16  SpO2:  [99 %-100 %] 100 %  BP: (116-134)/(62-84) 116/62     Weight: 93 kg (205 lb)  Body mass index is 36.31 kg/m².    Intake/Output Summary (Last 24 hours) at 17 0855  Last data filed at 17 0300   Gross per 24 hour   Intake             1200 ml   Output             1420 ml   Net             -220 ml     Significant Labs:  Recent Lab Results       17  1418      OB Glucose Screen 169(H)         FHT: reassuring, 140bpm, mod BTB variability, - accels, - decels  TOCO: no contractions      Physical Exam:   Constitutional: She is oriented to person, place, and time. She appears well-developed and well-nourished.    HENT:   Head: Normocephalic and atraumatic.     Neck: Normal range of motion.    Cardiovascular: Regular rhythm and normal heart sounds.     Pulmonary/Chest: Effort normal and breath sounds normal. No respiratory distress. She has no wheezes.        Abdominal: Soft. Bowel sounds are normal. She exhibits no distension. There is no tenderness.   Gravid 26w             Musculoskeletal: Normal range of motion and moves all extremeties.   UCHE/SCDs in place       Neurological: She is alert and oriented to person, place, and time. She has normal reflexes.    Skin: Skin is warm and dry. She is not diaphoretic.    Psychiatric: She has a normal mood and affect.       Assessment/Plan:     22 y.o. female  at 26w4d for:    Tachycardia    - EKG: sinus tach otherwise normal  - asymptomatic  - Transient and resolves spontaneously          26 weeks gestation of pregnancy    - PNV  - NST BID        History of hepatitis C    - Hep C viral load negative  - hepatic function panel normal        Hyperthyroidism    - no meds  - TSH low, FT4 normal  - thyroid antibodies negative        Hx of chlamydia infection    - GC/CT  was negative        Intrauterine growth restriction affecting antepartum care of mother in second trimester    - EFW 379g, 1% on , AEDF on . Repeat  growth scan next week.  - Fetal echo negative for VSD  - Work Up: CMV, parvo, toxoplasmosis -->negative  - Urine tox neg  - NST BID. Patient understands risks of FHR monitoring including early delivery  - UA dopplers twice weekly. Remains absent flow on 8/31.        Asthma    - well controlled, no meds  - continue to monitor        * Pre-eclampsia, severe, antepartum    Pre-Eclampsia w/ Severe Features (BP) at 25w complicated by FGR with AEDF  - Pre-E Labs stable - P/C: 5.44, AST: 21>25>16>11, Plt:240>322>310>348, Cr:0.5>0.6>0.6  - Repeat labs on 9/5  - s/p magnesium  - s/p BMZ series  - BP controlled on Procardia XL 60AM/30PM                Ramiro Del Valle MD  Obstetrics  [facility]

## 2017-09-03 LAB
POCT GLUCOSE: 167 MG/DL (ref 70–110)
POCT GLUCOSE: 84 MG/DL (ref 70–110)

## 2017-09-03 PROCEDURE — 59025 FETAL NON-STRESS TEST: CPT | Mod: 26,,, | Performed by: OBSTETRICS & GYNECOLOGY

## 2017-09-03 PROCEDURE — 59025 FETAL NON-STRESS TEST: CPT

## 2017-09-03 PROCEDURE — 82962 GLUCOSE BLOOD TEST: CPT

## 2017-09-03 PROCEDURE — 99233 SBSQ HOSP IP/OBS HIGH 50: CPT | Mod: 25,,, | Performed by: OBSTETRICS & GYNECOLOGY

## 2017-09-03 PROCEDURE — 11000001 HC ACUTE MED/SURG PRIVATE ROOM

## 2017-09-03 PROCEDURE — 25000003 PHARM REV CODE 250: Performed by: OBSTETRICS & GYNECOLOGY

## 2017-09-03 RX ADMIN — PRENATAL VIT W/ FE FUMARATE-FA TAB 27-0.8 MG 1 EACH: 27-0.8 TAB at 10:09

## 2017-09-03 RX ADMIN — NIFEDIPINE 30 MG: 30 TABLET, FILM COATED, EXTENDED RELEASE ORAL at 08:09

## 2017-09-03 RX ADMIN — NIFEDIPINE 60 MG: 30 TABLET, FILM COATED, EXTENDED RELEASE ORAL at 10:09

## 2017-09-03 NOTE — PROGRESS NOTES
Results for KITA FOFANA (MRN [id number]) as of 9/3/2017 14:22   Ref. Range 9/3/2017 13:33   POCT Glucose Latest Ref Range: 70 - 110 mg/dL 167 (H)   2 hr postprandial breakfast--called to

## 2017-09-03 NOTE — PLAN OF CARE
Problem: Patient Care Overview  Goal: Plan of Care Review  Outcome: Ongoing (interventions implemented as appropriate)  Patient doing well, Denies any LOF,ctx, VB. Ambulating off unit to [employer]. Currently resting with mother at bedside

## 2017-09-03 NOTE — SUBJECTIVE & OBJECTIVE
Obstetric HPI:  Patient is doing well this mornng and without questions or complaints. She denies symptoms of pre-eclampsia -denies vision changes, SOB, RUQ pain. Denies ctx, VB, LOF. Reports ood FM.     Objective:     Vital Signs (Most Recent):  Temp: 98.6 °F (37 °C) (09/02/17 1956)  Pulse: 81 (09/03/17 0405)  Resp: 16 (09/03/17 0404)  BP: 124/68 (09/03/17 0404)  SpO2: 100 % (09/03/17 0404) Vital Signs (24h Range):  Temp:  [97.6 °F (36.4 °C)-98.6 °F (37 °C)] 98.6 °F (37 °C)  Pulse:  [72-94] 81  Resp:  [16-18] 16  SpO2:  [98 %-100 %] 100 %  BP: (113-141)/(57-88) 124/68     Weight: 92.9 kg (204 lb 12.9 oz)  Body mass index is 36.28 kg/m².    Intake/Output Summary (Last 24 hours) at 09/03/17 0842  Last data filed at 09/02/17 1800   Gross per 24 hour   Intake              700 ml   Output              700 ml   Net                0 ml     Significant Labs:  Recent Lab Results       09/03/17 0406      POCT Glucose 84         FHT: reassuring, 140bpm, mod BTB variability, - accels, - decels  TOCO: no contractions      Physical Exam:   Constitutional: She is oriented to person, place, and time. She appears well-developed and well-nourished.    HENT:   Head: Normocephalic and atraumatic.     Neck: Normal range of motion.    Cardiovascular: Regular rhythm and normal heart sounds.     Pulmonary/Chest: Effort normal and breath sounds normal. No respiratory distress. She has no wheezes.        Abdominal: Soft. Bowel sounds are normal. She exhibits no distension. There is no tenderness.   Gravid 26w             Musculoskeletal: Normal range of motion and moves all extremeties.   UCHE/SCDs in place       Neurological: She is alert and oriented to person, place, and time. She has normal reflexes.    Skin: Skin is warm and dry. She is not diaphoretic.    Psychiatric: She has a normal mood and affect.

## 2017-09-03 NOTE — PROGRESS NOTES
[facility]  Obstetrics  Antepartum Progress Note    Patient Name: Myrtle Valdez  MRN: [id number]  Admission Date: 2017  Hospital Length of Stay: 12 days  Attending Physician: Martha Mcdermott MD  Primary Care Provider: Primary Doctor No    Subjective:     Principal Problem:Pre-eclampsia, severe, antepartum    HPI:  Myrtle Valdez is a 22 y.o. [id number] at 25w0d presents as a transport from [facility] secondary to PreE w/SF and FGR with AEDF.    This IUP is complicated by newly diagnosed PreE w/SF (severe range BP), fetal growth restriction (1%, AEDF, fetal hyperechoic bowel and possible VSD), asthma (well controlled, no meds), hx hyperthyroidism (no meds), questionable hx of HepC, hx of chlamydia.    Currently patient has no complaints. Patient denies headache, scotoma, RUQ pain, N/V, CP, SOB. Patient denies contractions, vaginal bleeding or discharge, denies LOF.   Fetal Movement: normal.     Hospital Course:  2017 - admitted to antepartum at 25w0d for newly diagnosed PreE w/SF in a pregnancy complicated by FGR (AEDF). BMZ series and Mag started at OSH. BP remained elevated, started on Procardia 30XL.  2017 - Doing well from PreE standpoint, asymptomatic, labs stable, BP now mild range.  2017 - - Continues to be stable from PrE standpoint. Platelets stable, AST stable. Continue FHR dopplers q shift.  HD7-12: BP regimen changed to Procardia XL 60AM/30PM due to elevated PM BP. Labs stable. Umbilical artery flow remains absent on doppler. Ob glucose screen 169.    Obstetric HPI:  Patient is doing well this mornng and without questions or complaints. She denies symptoms of pre-eclampsia -denies vision changes, SOB, RUQ pain. Denies ctx, VB, LOF. Reports ood FM.     Objective:     Vital Signs (Most Recent):  Temp: 98.6 °F (37 °C) (17)  Pulse: 81 (17)  Resp: 16 (17)  BP: 124/68 (17)  SpO2: 100 % (17) Vital Signs  (24h Range):  Temp:  [97.6 °F (36.4 °C)-98.6 °F (37 °C)] 98.6 °F (37 °C)  Pulse:  [72-94] 81  Resp:  [16-18] 16  SpO2:  [98 %-100 %] 100 %  BP: (113-141)/(57-88) 124/68     Weight: 92.9 kg (204 lb 12.9 oz)  Body mass index is 36.28 kg/m².    Intake/Output Summary (Last 24 hours) at 17 0842  Last data filed at 17 1800   Gross per 24 hour   Intake              700 ml   Output              700 ml   Net                0 ml     Significant Labs:  Recent Lab Results       17  0406      POCT Glucose 84         FHT: reassuring, 140bpm, mod BTB variability, - accels, - decels  TOCO: no contractions      Physical Exam:   Constitutional: She is oriented to person, place, and time. She appears well-developed and well-nourished.    HENT:   Head: Normocephalic and atraumatic.     Neck: Normal range of motion.    Cardiovascular: Regular rhythm and normal heart sounds.     Pulmonary/Chest: Effort normal and breath sounds normal. No respiratory distress. She has no wheezes.        Abdominal: Soft. Bowel sounds are normal. She exhibits no distension. There is no tenderness.   Gravid 26w             Musculoskeletal: Normal range of motion and moves all extremeties.   UCHE/SCDs in place       Neurological: She is alert and oriented to person, place, and time. She has normal reflexes.    Skin: Skin is warm and dry. She is not diaphoretic.    Psychiatric: She has a normal mood and affect.     AM NST pending  Assessment/Plan:     22 y.o. female  at 26w5d for:    Tachycardia    - EKG: sinus tach otherwise normal  - asymptomatic  - Transient and resolves spontaneously          26 weeks gestation of pregnancy    - PNV  - NST BID  - Glucose screen 169 - fasting glucose, and 2hpp        History of hepatitis C    - Hep C viral load negative  - hepatic function panel normal        Hyperthyroidism    - no meds  - TSH low, FT4 normal  - thyroid antibodies negative        Hx of chlamydia infection    - GC/CT  was  negative        Intrauterine growth restriction affecting antepartum care of mother in second trimester    - EFW 379g, 1% on 8/22, AEDF on 8/25. Repeat growth scan next week.  - Fetal echo negative for VSD  - Work Up: CMV, parvo, toxoplasmosis -->negative  - Urine tox neg  - NST BID. Patient understands risks of FHR monitoring including early delivery  - UA dopplers twice weekly. Remains absent flow on 8/31.        Asthma    - well controlled, no meds  - continue to monitor        * Pre-eclampsia, severe, antepartum    Pre-Eclampsia w/ Severe Features (BP) at 25w complicated by FGR with AEDF  - Pre-E Labs stable - P/C: 5.44, AST: 21>25>16>11, Plt:240>322>310>348, Cr:0.5>0.6>0.6  - Repeat labs on 9/5  - s/p magnesium  - s/p BMZ series  - BP controlled on Procardia XL 60AM/30PM    - BP: (113-141)/(57-88) 124/68                  JESSE Dutta MD  Obstetrics  [facility]

## 2017-09-03 NOTE — ASSESSMENT & PLAN NOTE
Pre-Eclampsia w/ Severe Features (BP) at 25w complicated by FGR with AEDF  - Pre-E Labs stable - P/C: 5.44, AST: 21>25>16>11, Plt:240>322>310>348, Cr:0.5>0.6>0.6  - Repeat labs on 9/5  - s/p magnesium  - s/p BMZ series  - BP controlled on Procardia XL 60AM/30PM    - BP: (113-141)/(57-88) 124/68

## 2017-09-04 LAB
POCT GLUCOSE: 140 MG/DL (ref 70–110)
POCT GLUCOSE: 81 MG/DL (ref 70–110)

## 2017-09-04 PROCEDURE — 99233 SBSQ HOSP IP/OBS HIGH 50: CPT | Mod: 25,,, | Performed by: OBSTETRICS & GYNECOLOGY

## 2017-09-04 PROCEDURE — 25000003 PHARM REV CODE 250: Performed by: OBSTETRICS & GYNECOLOGY

## 2017-09-04 PROCEDURE — 11000001 HC ACUTE MED/SURG PRIVATE ROOM

## 2017-09-04 PROCEDURE — 59025 FETAL NON-STRESS TEST: CPT | Mod: 26,,, | Performed by: OBSTETRICS & GYNECOLOGY

## 2017-09-04 RX ADMIN — NIFEDIPINE 60 MG: 30 TABLET, FILM COATED, EXTENDED RELEASE ORAL at 08:09

## 2017-09-04 RX ADMIN — PRENATAL VIT W/ FE FUMARATE-FA TAB 27-0.8 MG 1 EACH: 27-0.8 TAB at 08:09

## 2017-09-04 RX ADMIN — NIFEDIPINE 30 MG: 30 TABLET, FILM COATED, EXTENDED RELEASE ORAL at 09:09

## 2017-09-04 NOTE — SUBJECTIVE & OBJECTIVE
Obstetric HPI:  Patient is doing well this mornng and without questions or complaints. She denies symptoms of pre-eclampsia -denies vision changes, SOB, RUQ pain. Denies ctx, VB, LOF. Reports ood FM.     Objective:     Vital Signs (Most Recent):  Temp: 97.4 °F (36.3 °C) (09/04/17 0357)  Pulse: 80 (09/04/17 0357)  Resp: 18 (09/04/17 0357)  BP: 129/68 (09/04/17 0357)  SpO2: 100 % (09/04/17 0357) Vital Signs (24h Range):  Temp:  [97.2 °F (36.2 °C)-98.3 °F (36.8 °C)] 97.4 °F (36.3 °C)  Pulse:  [70-90] 80  Resp:  [16-18] 18  SpO2:  [99 %-100 %] 100 %  BP: (121-139)/(67-76) 129/68     Weight: 92.9 kg (204 lb 12.9 oz)  Body mass index is 36.28 kg/m².    Intake/Output Summary (Last 24 hours) at 09/04/17 0830  Last data filed at 09/04/17 0600   Gross per 24 hour   Intake             1340 ml   Output             2100 ml   Net             -760 ml     Significant Labs:  Recent Lab Results       09/04/17  0548 09/03/17  1333      POCT Glucose 81 167(H)         FHT: reassuring, 140bpm, mod BTB variability, - accels, - decels  TOCO: no contractions      Physical Exam:   Constitutional: She is oriented to person, place, and time. She appears well-developed and well-nourished.    HENT:   Head: Normocephalic and atraumatic.     Neck: Normal range of motion.    Cardiovascular: Regular rhythm and normal heart sounds.     Pulmonary/Chest: Effort normal and breath sounds normal. No respiratory distress. She has no wheezes.        Abdominal: Soft. Bowel sounds are normal. She exhibits no distension. There is no tenderness.   Gravid 26w             Musculoskeletal: Normal range of motion and moves all extremeties.   UCHE/SCDs in place       Neurological: She is alert and oriented to person, place, and time. She has normal reflexes.    Skin: Skin is warm and dry. She is not diaphoretic.    Psychiatric: She has a normal mood and affect.

## 2017-09-04 NOTE — PLAN OF CARE
Problem: Patient Care Overview  Goal: Plan of Care Review  Vital signs stable and WDL, afebrile; reports + feta; movement, denies rupture, vag bleeding, pain, cramping, preeclampsia symptoms; tracings reviewed per Wandy

## 2017-09-04 NOTE — PROGRESS NOTES
[facility]  Obstetrics  Antepartum Progress Note    Patient Name: Myrtle Valdez  MRN: [id number]  Admission Date: 2017  Hospital Length of Stay: 13 days  Attending Physician: Martha Mcdermott MD  Primary Care Provider: Primary Doctor No    Subjective:     Principal Problem:Pre-eclampsia, severe, antepartum    HPI:  Myrtle Valdez is a 22 y.o. [id number] at 25w0d presents as a transport from [facility] secondary to PreE w/SF and FGR with AEDF.    This IUP is complicated by newly diagnosed PreE w/SF (severe range BP), fetal growth restriction (1%, AEDF, fetal hyperechoic bowel and possible VSD), asthma (well controlled, no meds), hx hyperthyroidism (no meds), questionable hx of HepC, hx of chlamydia.    Currently patient has no complaints. Patient denies headache, scotoma, RUQ pain, N/V, CP, SOB. Patient denies contractions, vaginal bleeding or discharge, denies LOF.   Fetal Movement: normal.     Hospital Course:  2017 - admitted to antepartum at 25w0d for newly diagnosed PreE w/SF in a pregnancy complicated by FGR (AEDF). BMZ series and Mag started at OSH. BP remained elevated, started on Procardia 30XL.  2017 - Doing well from PreE standpoint, asymptomatic, labs stable, BP now mild range.  2017 - - Continues to be stable from PrE standpoint. Platelets stable, AST stable. Continue FHR dopplers q shift.  HD7-13: BP regimen changed to Procardia XL 60AM/30PM due to elevated PM BP. Labs stable. Umbilical artery flow remains absent on doppler. Ob glucose screen 169. Monitoring Bg.     Obstetric HPI:  Patient is doing well this mornng and without questions or complaints. She denies symptoms of pre-eclampsia -denies vision changes, SOB, RUQ pain. Denies ctx, VB, LOF. Reports ood Fm. Fasting glucose this morning is 81.     Objective:     Vital Signs (Most Recent):  Temp: 97.4 °F (36.3 °C) (17)  Pulse: 80 (17)  Resp: 18 (17)  BP: 129/68  (17 035)  SpO2: 100 % (17 035) Vital Signs (24h Range):  Temp:  [97.2 °F (36.2 °C)-98.3 °F (36.8 °C)] 97.4 °F (36.3 °C)  Pulse:  [70-90] 80  Resp:  [16-18] 18  SpO2:  [99 %-100 %] 100 %  BP: (121-139)/(67-76) 129/68     Weight: 92.9 kg (204 lb 12.9 oz)  Body mass index is 36.28 kg/m².    Intake/Output Summary (Last 24 hours) at 17 0830  Last data filed at 17 0600   Gross per 24 hour   Intake             1340 ml   Output             2100 ml   Net             -760 ml     Significant Labs:  Recent Lab Results       17  0548 17  1333      POCT Glucose 81 167(H)         FHT: reassuring, 140bpm, mod BTB variability, - accels, - decels  TOCO: no contractions      Physical Exam:   Constitutional: She is oriented to person, place, and time. She appears well-developed and well-nourished.    HENT:   Head: Normocephalic and atraumatic.     Neck: Normal range of motion.    Cardiovascular: Regular rhythm and normal heart sounds.     Pulmonary/Chest: Effort normal and breath sounds normal. No respiratory distress. She has no wheezes.        Abdominal: Soft. Bowel sounds are normal. She exhibits no distension. There is no tenderness.   Gravid 26w             Musculoskeletal: Normal range of motion and moves all extremeties.   UCHE/SCDs in place       Neurological: She is alert and oriented to person, place, and time. She has normal reflexes.    Skin: Skin is warm and dry. She is not diaphoretic.    Psychiatric: She has a normal mood and affect.       Assessment/Plan:     22 y.o. female  at 26w6d for:    Tachycardia    - EKG: sinus tach otherwise normal  - asymptomatic  - Transient and resolves spontaneously          26 weeks gestation of pregnancy    - PNV  - NST BID  - Glucose screen 169 - will monitor fasting glucose, and 2hpp        History of hepatitis C    - Hep C viral load negative  - hepatic function panel normal        Hyperthyroidism    - no meds  - TSH low, FT4 normal  -  thyroid antibodies negative        Hx of chlamydia infection    - GC/CT 8/22 was negative        Intrauterine growth restriction affecting antepartum care of mother in second trimester    - EFW 379g, 1% on 8/22, AEDF on 8/25. Repeat growth scan next week.  - Fetal echo negative for VSD  - Work Up: CMV, parvo, toxoplasmosis -->negative  - Urine tox neg  - NST BID. Patient understands risks of FHR monitoring including early delivery  - UA dopplers twice weekly. Remains absent flow on 8/31.        Asthma    - well controlled, no meds  - continue to monitor        * Pre-eclampsia, severe, antepartum    Pre-Eclampsia w/ Severe Features (BP) at 25w complicated by FGR with AEDF  - Pre-E Labs stable - P/C: 5.44, AST: 21>25>16>11, Plt:240>322>310>348, Cr:0.5>0.6>0.6  - Repeat labs on 9/5  - s/p magnesium  - s/p BMZ series  - BP controlled on Procardia XL 60AM/30PM   BP: (121-139)/(67-76) 129/68                    Ritu Ledezma MD  Obstetrics  [facility]

## 2017-09-04 NOTE — ASSESSMENT & PLAN NOTE
Pre-Eclampsia w/ Severe Features (BP) at 25w complicated by FGR with AEDF  - Pre-E Labs stable - P/C: 5.44, AST: 21>25>16>11, Plt:240>322>310>348, Cr:0.5>0.6>0.6  - Repeat labs on 9/5  - s/p magnesium  - s/p BMZ series  - BP controlled on Procardia XL 60AM/30PM   BP: (121-139)/(67-76) 129/68

## 2017-09-04 NOTE — PLAN OF CARE
Patient denies LOF, vaginal bleeding, contractions, and reports normal fetal movement. BID NST completed. Tolerating PO intake without complaints of n/v. Ambulates independently to the bathroom with steady gait. Voids spontaneously without difficulty and is passing gas. VSS throughout the day. All questions answered. Will continue to monitor.

## 2017-09-05 PROBLEM — J45.20 MILD INTERMITTENT ASTHMA WITHOUT COMPLICATION: Status: RESOLVED | Noted: 2017-08-26 | Resolved: 2017-09-05

## 2017-09-05 LAB
ALBUMIN SERPL BCP-MCNC: 2.6 G/DL
ALP SERPL-CCNC: 125 U/L
ALT SERPL W/O P-5'-P-CCNC: 8 U/L
ANION GAP SERPL CALC-SCNC: 10 MMOL/L
AST SERPL-CCNC: 12 U/L
BASOPHILS # BLD AUTO: 0.02 K/UL
BASOPHILS NFR BLD: 0.1 %
BILIRUB SERPL-MCNC: 0.1 MG/DL
BUN SERPL-MCNC: 9 MG/DL
CALCIUM SERPL-MCNC: 9.5 MG/DL
CHLORIDE SERPL-SCNC: 108 MMOL/L
CO2 SERPL-SCNC: 18 MMOL/L
CREAT SERPL-MCNC: 0.6 MG/DL
DIFFERENTIAL METHOD: ABNORMAL
EOSINOPHIL # BLD AUTO: 0.3 K/UL
EOSINOPHIL NFR BLD: 1.3 %
ERYTHROCYTE [DISTWIDTH] IN BLOOD BY AUTOMATED COUNT: 12.9 %
EST. GFR  (AFRICAN AMERICAN): >60 ML/MIN/1.73 M^2
EST. GFR  (NON AFRICAN AMERICAN): >60 ML/MIN/1.73 M^2
GLUCOSE SERPL-MCNC: 94 MG/DL
HCT VFR BLD AUTO: 34.9 %
HGB BLD-MCNC: 11.7 G/DL
LYMPHOCYTES # BLD AUTO: 2.3 K/UL
LYMPHOCYTES NFR BLD: 12.3 %
MCH RBC QN AUTO: 28.2 PG
MCHC RBC AUTO-ENTMCNC: 33.5 G/DL
MCV RBC AUTO: 84 FL
MONOCYTES # BLD AUTO: 1 K/UL
MONOCYTES NFR BLD: 5.3 %
NEUTROPHILS # BLD AUTO: 15.1 K/UL
NEUTROPHILS NFR BLD: 80.6 %
PLATELET # BLD AUTO: 339 K/UL
PMV BLD AUTO: 10.3 FL
POTASSIUM SERPL-SCNC: 3.9 MMOL/L
PROT SERPL-MCNC: 6.2 G/DL
RBC # BLD AUTO: 4.15 M/UL
SODIUM SERPL-SCNC: 136 MMOL/L
WBC # BLD AUTO: 18.74 K/UL

## 2017-09-05 PROCEDURE — 80053 COMPREHEN METABOLIC PANEL: CPT

## 2017-09-05 PROCEDURE — 76820 UMBILICAL ARTERY ECHO: CPT | Mod: 26,,, | Performed by: OBSTETRICS & GYNECOLOGY

## 2017-09-05 PROCEDURE — 99232 SBSQ HOSP IP/OBS MODERATE 35: CPT | Mod: 25,,, | Performed by: OBSTETRICS & GYNECOLOGY

## 2017-09-05 PROCEDURE — 36415 COLL VENOUS BLD VENIPUNCTURE: CPT

## 2017-09-05 PROCEDURE — 59025 FETAL NON-STRESS TEST: CPT | Mod: 26,,, | Performed by: OBSTETRICS & GYNECOLOGY

## 2017-09-05 PROCEDURE — 63600175 PHARM REV CODE 636 W HCPCS: Performed by: OBSTETRICS & GYNECOLOGY

## 2017-09-05 PROCEDURE — 11000001 HC ACUTE MED/SURG PRIVATE ROOM

## 2017-09-05 PROCEDURE — 96372 THER/PROPH/DIAG INJ SC/IM: CPT

## 2017-09-05 PROCEDURE — 85025 COMPLETE CBC W/AUTO DIFF WBC: CPT

## 2017-09-05 PROCEDURE — 59025 FETAL NON-STRESS TEST: CPT

## 2017-09-05 PROCEDURE — 76816 OB US FOLLOW-UP PER FETUS: CPT | Mod: 26,,, | Performed by: OBSTETRICS & GYNECOLOGY

## 2017-09-05 PROCEDURE — 25000003 PHARM REV CODE 250: Performed by: OBSTETRICS & GYNECOLOGY

## 2017-09-05 RX ORDER — BETAMETHASONE SODIUM PHOSPHATE AND BETAMETHASONE ACETATE 3; 3 MG/ML; MG/ML
12 INJECTION, SUSPENSION INTRA-ARTICULAR; INTRALESIONAL; INTRAMUSCULAR; SOFT TISSUE EVERY 24 HOURS
Status: COMPLETED | OUTPATIENT
Start: 2017-09-05 | End: 2017-09-06

## 2017-09-05 RX ADMIN — NIFEDIPINE 30 MG: 30 TABLET, FILM COATED, EXTENDED RELEASE ORAL at 08:09

## 2017-09-05 RX ADMIN — BETAMETHASONE SODIUM PHOSPHATE AND BETAMETHASONE ACETATE 12 MG: 3; 3 INJECTION, SUSPENSION INTRA-ARTICULAR; INTRALESIONAL; INTRAMUSCULAR at 04:09

## 2017-09-05 RX ADMIN — NIFEDIPINE 60 MG: 30 TABLET, FILM COATED, EXTENDED RELEASE ORAL at 09:09

## 2017-09-05 RX ADMIN — PRENATAL VIT W/ FE FUMARATE-FA TAB 27-0.8 MG 1 EACH: 27-0.8 TAB at 09:09

## 2017-09-05 NOTE — ASSESSMENT & PLAN NOTE
- EFW 379g, 1% on 8/22, AEDF on 8/25.  - Fetal echo negative for VSD  - Work Up: CMV, parvo, toxoplasmosis -->negative  - Urine tox neg  - NST BID. Patient understands risks of FHR monitoring including early delivery  - MVP/Dopplers/Growth scan planned for today

## 2017-09-05 NOTE — ASSESSMENT & PLAN NOTE
Pre-Eclampsia w/ Severe Features (BP) at 25w complicated by FGR with AEDF  - Pre-E Labs stable - P/C: 5.44, AST: 25>16>11>12, Plt:322>310>348>339, Cr:0.6>0.6  - s/p magnesium  - s/p BMZ series  - BP controlled on Procardia XL 60AM/30PM

## 2017-09-05 NOTE — SUBJECTIVE & OBJECTIVE
Obstetric HPI:  Patient is doing well this mornng and without questions or complaints. She denies symptoms of pre-eclampsia -denies vision changes, SOB, RUQ pain. Denies ctx, VB, LOF. Reports good FM.     Objective:     Vital Signs (Most Recent):  Temp: 98.8 °F (37.1 °C) (09/05/17 0549)  Pulse: 79 (09/05/17 0549)  Resp: 18 (09/05/17 0549)  BP: 122/70 (09/05/17 0549)  SpO2: 100 % (09/05/17 0549) Vital Signs (24h Range):  Temp:  [97.5 °F (36.4 °C)-99.1 °F (37.3 °C)] 98.8 °F (37.1 °C)  Pulse:  [76-96] 79  Resp:  [18] 18  SpO2:  [98 %-100 %] 100 %  BP: (121-133)/(69-82) 122/70     Weight: 92.6 kg (204 lb 2.3 oz)  Body mass index is 36.17 kg/m².  No intake or output data in the 24 hours ending 09/05/17 0655  Significant Labs:  Recent Lab Results       09/05/17  0432 09/04/17  1231      Albumin 2.6(L)      Alkaline Phosphatase 125      ALT 8(L)      Anion Gap 10      AST 12      Baso # 0.02      Basophil% 0.1      Total Bilirubin 0.1  Comment:  For infants and newborns, interpretation of results should be based  on gestational age, weight and in agreement with clinical  observations.  Premature Infant recommended reference ranges:  Up to 24 hours.............<8.0 mg/dL  Up to 48 hours............<12.0 mg/dL  3-5 days..................<15.0 mg/dL  6-29 days.................<15.0 mg/dL        BUN, Bld 9      Calcium 9.5      Chloride 108      CO2 18(L)      Creatinine 0.6      Differential Method Automated      eGFR if  >60      eGFR if non  >60  Comment:  Calculation used to obtain the estimated glomerular filtration  rate (eGFR) is the CKD-EPI equation. Since race is unknown   in our information system, the eGFR values for   -American and Non--American patients are given   for each creatinine result.        Eos # 0.3      Eosinophil% 1.3      Glucose 94      Gran # 15.1(H)      Gran% 80.6(H)      Hematocrit 34.9(L)      Hemoglobin 11.7(L)      Lymph # 2.3      Lymph% 12.3(L)       MCH 28.2      MCHC 33.5      MCV 84      Mono # 1.0      Mono% 5.3      MPV 10.3      Platelets 339      POCT Glucose  140(H)     Potassium 3.9      Total Protein 6.2      RBC 4.15      RDW 12.9      Sodium 136      WBC 18.74(H)          FHT: reassuring, 140bpm, mod BTB variability, - accels, - decels  TOCO: no contractions      Physical Exam:   Constitutional: She is oriented to person, place, and time. She appears well-developed and well-nourished.    HENT:   Head: Normocephalic and atraumatic.     Neck: Normal range of motion.    Cardiovascular: Regular rhythm and normal heart sounds.     Pulmonary/Chest: Effort normal and breath sounds normal. No respiratory distress. She has no wheezes.        Abdominal: Soft. Bowel sounds are normal. She exhibits no distension. There is no tenderness.   Gravid 26w             Musculoskeletal: Normal range of motion and moves all extremeties.   UCHE/SCDs in place       Neurological: She is alert and oriented to person, place, and time. She has normal reflexes.    Skin: Skin is warm and dry. She is not diaphoretic.    Psychiatric: She has a normal mood and affect.

## 2017-09-05 NOTE — PROGRESS NOTES
[facility]  Obstetrics  Antepartum Progress Note    Patient Name: Myrtle Valdez  MRN: [id number]  Admission Date: 2017  Hospital Length of Stay: 14 days  Attending Physician: Martha Mcdermott MD  Primary Care Provider: Primary Doctor No    Subjective:     Principal Problem:Pre-eclampsia, severe, antepartum    HPI:  Myrtle Valdez is a 22 y.o. [id number] at 25w0d presents as a transport from [facility] secondary to PreE w/SF and FGR with AEDF.    This IUP is complicated by newly diagnosed PreE w/SF (severe range BP), fetal growth restriction (1%, AEDF, fetal hyperechoic bowel and possible VSD), asthma (well controlled, no meds), hx hyperthyroidism (no meds), questionable hx of HepC, hx of chlamydia.    Currently patient has no complaints. Patient denies headache, scotoma, RUQ pain, N/V, CP, SOB. Patient denies contractions, vaginal bleeding or discharge, denies LOF.   Fetal Movement: normal.     Hospital Course:  2017 - admitted to antepartum at 25w0d for newly diagnosed PreE w/SF in a pregnancy complicated by FGR (AEDF). BMZ series and Mag started at OSH. BP remained elevated, started on Procardia 30XL.  2017 - Doing well from PreE standpoint, asymptomatic, labs stable, BP now mild range.  2017 - - Continues to be stable from PrE standpoint. Platelets stable, AST stable. Continue FHR dopplers q shift.  HD7-14: BP regimen changed to Procardia XL 60AM/30PM due to elevated PM BP. Labs stable. Umbilical artery flow remains absent on doppler. Ob glucose screen 169. Will order 3hour GTT this week.     Obstetric HPI:  Patient is doing well this mornng and without questions or complaints. She denies symptoms of pre-eclampsia -denies vision changes, SOB, RUQ pain. Denies ctx, VB, LOF. Reports good FM.     Objective:     Vital Signs (Most Recent):  Temp: 98.8 °F (37.1 °C) (17)  Pulse: 79 (17)  Resp: 18 (17)  BP: 122/70 (17 0549)  SpO2:  100 % (09/05/17 0549) Vital Signs (24h Range):  Temp:  [97.5 °F (36.4 °C)-99.1 °F (37.3 °C)] 98.8 °F (37.1 °C)  Pulse:  [76-96] 79  Resp:  [18] 18  SpO2:  [98 %-100 %] 100 %  BP: (121-133)/(69-82) 122/70     Weight: 92.6 kg (204 lb 2.3 oz)  Body mass index is 36.17 kg/m².  No intake or output data in the 24 hours ending 09/05/17 0655  Significant Labs:  Recent Lab Results       09/05/17  0432 09/04/17  1231      Albumin 2.6(L)      Alkaline Phosphatase 125      ALT 8(L)      Anion Gap 10      AST 12      Baso # 0.02      Basophil% 0.1      Total Bilirubin 0.1  Comment:  For infants and newborns, interpretation of results should be based  on gestational age, weight and in agreement with clinical  observations.  Premature Infant recommended reference ranges:  Up to 24 hours.............<8.0 mg/dL  Up to 48 hours............<12.0 mg/dL  3-5 days..................<15.0 mg/dL  6-29 days.................<15.0 mg/dL        BUN, Bld 9      Calcium 9.5      Chloride 108      CO2 18(L)      Creatinine 0.6      Differential Method Automated      eGFR if  >60      eGFR if non  >60  Comment:  Calculation used to obtain the estimated glomerular filtration  rate (eGFR) is the CKD-EPI equation. Since race is unknown   in our information system, the eGFR values for   -American and Non--American patients are given   for each creatinine result.        Eos # 0.3      Eosinophil% 1.3      Glucose 94      Gran # 15.1(H)      Gran% 80.6(H)      Hematocrit 34.9(L)      Hemoglobin 11.7(L)      Lymph # 2.3      Lymph% 12.3(L)      MCH 28.2      MCHC 33.5      MCV 84      Mono # 1.0      Mono% 5.3      MPV 10.3      Platelets 339      POCT Glucose  140(H)     Potassium 3.9      Total Protein 6.2      RBC 4.15      RDW 12.9      Sodium 136      WBC 18.74(H)          FHT: reassuring, 140bpm, mod BTB variability, - accels, - decels  TOCO: no contractions      Physical Exam:   Constitutional: She is  oriented to person, place, and time. She appears well-developed and well-nourished.    HENT:   Head: Normocephalic and atraumatic.     Neck: Normal range of motion.    Cardiovascular: Regular rhythm and normal heart sounds.     Pulmonary/Chest: Effort normal and breath sounds normal. No respiratory distress. She has no wheezes.        Abdominal: Soft. Bowel sounds are normal. She exhibits no distension. There is no tenderness.   Gravid 26w             Musculoskeletal: Normal range of motion and moves all extremeties.   UCHE/SCDs in place       Neurological: She is alert and oriented to person, place, and time. She has normal reflexes.    Skin: Skin is warm and dry. She is not diaphoretic.    Psychiatric: She has a normal mood and affect.       Assessment/Plan:     22 y.o. female  at 27w0d for:    Tachycardia    - EKG: sinus tach otherwise normal  - asymptomatic  - Transient and resolves spontaneously          26 weeks gestation of pregnancy    - PNV  - NST BID  - Glucose screen 169 - will monitor fasting glucose, and 2hpp        History of hepatitis C    - Hep C viral load negative  - hepatic function panel normal        Hyperthyroidism    - no meds  - TSH low, FT4 normal  - thyroid antibodies negative        Hx of chlamydia infection    - GC/CT  was negative        Intrauterine growth restriction affecting antepartum care of mother in second trimester    - EFW 379g, 1% on , AEDF on .  - Fetal echo negative for VSD  - Work Up: CMV, parvo, toxoplasmosis -->negative  - Urine tox neg  - NST BID. Patient understands risks of FHR monitoring including early delivery  - MVP/Dopplers/Growth scan planned for today        Asthma    - well controlled, no meds  - continue to monitor        * Pre-eclampsia, severe, antepartum    Pre-Eclampsia w/ Severe Features (BP) at 25w complicated by FGR with AEDF  - Pre-E Labs stable - P/C: 5.44, AST: 25>16>11>12, Plt:322>310>348>339, Cr:0.6>0.6  - s/p magnesium  - s/p BMZ  series  - BP controlled on Procardia XL 60AM/30PM                   Ramiro Del Valle MD  Obstetrics  [facility]

## 2017-09-06 PROCEDURE — 99232 SBSQ HOSP IP/OBS MODERATE 35: CPT | Mod: 25,,, | Performed by: OBSTETRICS & GYNECOLOGY

## 2017-09-06 PROCEDURE — 59025 FETAL NON-STRESS TEST: CPT | Mod: 26,,, | Performed by: OBSTETRICS & GYNECOLOGY

## 2017-09-06 PROCEDURE — 63600175 PHARM REV CODE 636 W HCPCS: Performed by: OBSTETRICS & GYNECOLOGY

## 2017-09-06 PROCEDURE — 11000001 HC ACUTE MED/SURG PRIVATE ROOM

## 2017-09-06 PROCEDURE — 25000003 PHARM REV CODE 250: Performed by: OBSTETRICS & GYNECOLOGY

## 2017-09-06 RX ADMIN — PRENATAL VIT W/ FE FUMARATE-FA TAB 27-0.8 MG 1 EACH: 27-0.8 TAB at 09:09

## 2017-09-06 RX ADMIN — NIFEDIPINE 60 MG: 30 TABLET, FILM COATED, EXTENDED RELEASE ORAL at 09:09

## 2017-09-06 RX ADMIN — BETAMETHASONE SODIUM PHOSPHATE AND BETAMETHASONE ACETATE 12 MG: 3; 3 INJECTION, SUSPENSION INTRA-ARTICULAR; INTRALESIONAL; INTRAMUSCULAR at 04:09

## 2017-09-06 RX ADMIN — NIFEDIPINE 30 MG: 30 TABLET, FILM COATED, EXTENDED RELEASE ORAL at 09:09

## 2017-09-06 NOTE — PLAN OF CARE
Problem: Patient Care Overview  Goal: Plan of Care Review  Vital signs WDL; denies rupture, vaginal bleeding, pain, cramping; reports + fetal movement; NST x 2 completed; MDs reviewed tracing; 1st rescue dose on BMZ given

## 2017-09-06 NOTE — ASSESSMENT & PLAN NOTE
- Pre-E Labs stable (9/5)- P/C: 5.44, AST: 25>16>11>12, Plt:322>310>348>339, Cr:0.6>0.6  - s/p magnesium  - s/p BMZ series  - BP controlled on Procardia XL 60AM/30PM   - Repeat labs on 9/12

## 2017-09-06 NOTE — ASSESSMENT & PLAN NOTE
- Fetal echo negative for VSD  - Work Up: CMV, parvo, toxoplasmosis -->negative  - Urine tox neg  - NST BID. Patient understands risks of FHR monitoring including early delivery  - Rescue course of BMZ started  - Stats from scan yesterday not uploaded to computer, but infant is ~420g (<1%) with MVP 3.2 cm. Fluid is overall decreased

## 2017-09-06 NOTE — SUBJECTIVE & OBJECTIVE
Obstetric HPI:  Patient is doing well this mornng and without questions or complaints. She denies symptoms of pre-eclampsia -denies vision changes, SOB, RUQ pain. Denies ctx, VB, LOF. Reports good FM.     Objective:     Vital Signs (Most Recent):  Temp: 98.1 °F (36.7 °C) (09/06/17 0339)  Pulse: 96 (09/06/17 0339)  Resp: 14 (09/06/17 0339)  BP: 139/81 (09/06/17 0339)  SpO2: 100 % (09/05/17 1802) Vital Signs (24h Range):  Temp:  [97 °F (36.1 °C)-98.1 °F (36.7 °C)] 98.1 °F (36.7 °C)  Pulse:  [] 96  Resp:  [14-18] 14  SpO2:  [100 %] 100 %  BP: (127-146)/(59-91) 139/81     Weight: 92.6 kg (204 lb 2.3 oz)  Body mass index is 36.17 kg/m².    Intake/Output Summary (Last 24 hours) at 09/06/17 0614  Last data filed at 09/05/17 1800   Gross per 24 hour   Intake              950 ml   Output              825 ml   Net              125 ml     Significant Labs:  Recent Lab Results     None        FHT: reassuring, 140bpm, mod BTB variability, - accels, occasional variable decels  TOCO: no contractions      Physical Exam:   Constitutional: She is oriented to person, place, and time. She appears well-developed and well-nourished.    HENT:   Head: Normocephalic and atraumatic.     Neck: Normal range of motion.    Cardiovascular: Regular rhythm and normal heart sounds.     Pulmonary/Chest: Effort normal and breath sounds normal. No respiratory distress. She has no wheezes.        Abdominal: Soft. Bowel sounds are normal. She exhibits no distension. There is no tenderness.   Gravid 27w             Musculoskeletal: Normal range of motion and moves all extremeties.   UCHE/SCDs in place       Neurological: She is alert and oriented to person, place, and time. She has normal reflexes.    Skin: Skin is warm and dry. She is not diaphoretic.    Psychiatric: She has a normal mood and affect.

## 2017-09-06 NOTE — PLAN OF CARE
Problem: Patient Care Overview  Goal: Plan of Care Review  Outcome: Ongoing (interventions implemented as appropriate)  VSS throughout shift. Pt afebrile. Pt reports +FM; denies ctxs, LOF, VB & pain. No needs at this time. Call light within reach with bed locked in lowest position. Will continue to monitor.

## 2017-09-06 NOTE — PLAN OF CARE
Problem: Patient Care Overview  Goal: Plan of Care Review  Outcome: Ongoing (interventions implemented as appropriate)  VSS throughout shift. Pt afebrile. Pt reports +FM; denies ctxs, LOF, VB & pain. No needs at this time. Call light within reach with bed locked in lowest position. NST done BID, baseline 135-140 bpm, minimal variability.  Will continue to monitor.

## 2017-09-06 NOTE — PROGRESS NOTES
[facility]  Obstetrics  Antepartum Progress Note    Patient Name: Myrtle Valdez  MRN: [id number]  Admission Date: 2017  Hospital Length of Stay: 15 days  Attending Physician: Martha Mcdermott MD  Primary Care Provider: Primary Doctor No    Subjective:     Principal Problem:Pre-eclampsia, severe, antepartum    HPI:  Myrtle Valdez is a 22 y.o. [id number] at 25w0d presents as a transport from [facility] secondary to PreE w/SF and FGR with AEDF.    This IUP is complicated by newly diagnosed PreE w/SF (severe range BP), fetal growth restriction (1%, AEDF, fetal hyperechoic bowel and possible VSD), asthma (well controlled, no meds), hx hyperthyroidism (no meds), questionable hx of HepC, hx of chlamydia.    Currently patient has no complaints. Patient denies headache, scotoma, RUQ pain, N/V, CP, SOB. Patient denies contractions, vaginal bleeding or discharge, denies LOF.   Fetal Movement: normal.     Hospital Course:  2017 - admitted to antepartum at 25w0d for newly diagnosed PreE w/SF in a pregnancy complicated by FGR (AEDF). BMZ series and Mag started at OSH. BP remained elevated, started on Procardia 30XL.  2017 - Doing well from PreE standpoint, asymptomatic, labs stable, BP now mild range.  2017 - - Continues to be stable from PrE standpoint. Platelets stable, AST stable. Continue FHR dopplers q shift.  HD7-14: BP regimen changed to Procardia XL 60AM/30PM due to elevated PM BP. Labs stable. Umbilical artery flow remains absent on doppler. Ob glucose screen 169. Will order 3hour GTT this week.   HD15: Growth scan 420 g, <1%ile. Continue NST BID. Rescue course of BMZ started.    Obstetric HPI:  Patient is doing well this mornng and without questions or complaints. She denies symptoms of pre-eclampsia -denies vision changes, SOB, RUQ pain. Denies ctx, VB, LOF. Reports good FM.     Objective:     Vital Signs (Most Recent):  Temp: 98.1 °F (36.7 °C) (17  0339)  Pulse: 96 (17 033)  Resp: 14 (17 033)  BP: 139/81 (17 0339)  SpO2: 100 % (17 1802) Vital Signs (24h Range):  Temp:  [97 °F (36.1 °C)-98.1 °F (36.7 °C)] 98.1 °F (36.7 °C)  Pulse:  [] 96  Resp:  [14-18] 14  SpO2:  [100 %] 100 %  BP: (127-146)/(59-91) 139/81     Weight: 92.6 kg (204 lb 2.3 oz)  Body mass index is 36.17 kg/m².    Intake/Output Summary (Last 24 hours) at 17 06  Last data filed at 17 1800   Gross per 24 hour   Intake              950 ml   Output              825 ml   Net              125 ml     Significant Labs:  Recent Lab Results     None        FHT: reassuring, 140bpm, mod BTB variability, - accels, occasional variable decels  TOCO: no contractions      Physical Exam:   Constitutional: She is oriented to person, place, and time. She appears well-developed and well-nourished.    HENT:   Head: Normocephalic and atraumatic.     Neck: Normal range of motion.    Cardiovascular: Regular rhythm and normal heart sounds.     Pulmonary/Chest: Effort normal and breath sounds normal. No respiratory distress. She has no wheezes.        Abdominal: Soft. Bowel sounds are normal. She exhibits no distension. There is no tenderness.   Gravid 27w             Musculoskeletal: Normal range of motion and moves all extremeties.   UCHE/SCDs in place       Neurological: She is alert and oriented to person, place, and time. She has normal reflexes.    Skin: Skin is warm and dry. She is not diaphoretic.    Psychiatric: She has a normal mood and affect.       Assessment/Plan:     22 y.o. female  at 27w1d for:    * Pre-eclampsia, severe, antepartum    - Pre-E Labs stable ()- P/C: 5.44, AST: 25>16>11>12, Plt:322>310>348>339, Cr:0.6>0.6  - s/p magnesium  - s/p BMZ series  - BP controlled on Procardia XL 60AM/30PM   - Repeat labs on           Tachycardia    - EKG: sinus tach otherwise normal  - asymptomatic  - Transient and resolves spontaneously          26 weeks  gestation of pregnancy    - PNV  - NST BID  - Glucose screen 169 - will monitor fasting glucose, and 2hpp        History of hepatitis C    - Hep C viral load negative  - hepatic function panel normal        Hyperthyroidism    - no meds  - TSH low, FT4 normal  - thyroid antibodies negative        Hx of chlamydia infection    - GC/CT 8/22 was negative        Intrauterine growth restriction affecting antepartum care of mother in second trimester    - Fetal echo negative for VSD  - Work Up: CMV, parvo, toxoplasmosis -->negative  - Urine tox neg  - NST BID. Patient understands risks of FHR monitoring including early delivery  - Rescue course of BMZ started  - Stats from scan yesterday not uploaded to computer, but infant is ~420g (<1%) with MVP 3.2 cm. Fluid is overall decreased        Asthma    - well controlled, no meds  - continue to monitor              Ramiro Del Valle MD  Obstetrics  [facility]

## 2017-09-07 PROCEDURE — 25000003 PHARM REV CODE 250: Performed by: OBSTETRICS & GYNECOLOGY

## 2017-09-07 PROCEDURE — 59025 FETAL NON-STRESS TEST: CPT | Mod: 26,,, | Performed by: OBSTETRICS & GYNECOLOGY

## 2017-09-07 PROCEDURE — 11000001 HC ACUTE MED/SURG PRIVATE ROOM

## 2017-09-07 PROCEDURE — 99231 SBSQ HOSP IP/OBS SF/LOW 25: CPT | Mod: 25,,, | Performed by: OBSTETRICS & GYNECOLOGY

## 2017-09-07 RX ADMIN — PRENATAL VIT W/ FE FUMARATE-FA TAB 27-0.8 MG 1 EACH: 27-0.8 TAB at 08:09

## 2017-09-07 RX ADMIN — NIFEDIPINE 30 MG: 30 TABLET, FILM COATED, EXTENDED RELEASE ORAL at 08:09

## 2017-09-07 RX ADMIN — NIFEDIPINE 60 MG: 30 TABLET, FILM COATED, EXTENDED RELEASE ORAL at 08:09

## 2017-09-07 NOTE — PLAN OF CARE
Problem: Patient Care Overview  Goal: Plan of Care Review  Outcome: Ongoing (interventions implemented as appropriate)  Pt doing well, no acute changes during this shift, vital signs stable, no signs of distress, pt report positive fetal movement, pt denies contraction, vaginal bleeding, and LOF.   Pt denies headache, URQ pain, visual changes. Will continue to monitor patient.

## 2017-09-07 NOTE — SUBJECTIVE & OBJECTIVE
Obstetric HPI:  Patient is doing well this mornng and without questions or complaints. She denies symptoms of pre-eclampsia -denies vision changes, SOB, RUQ pain. Denies ctx, VB, LOF. Reports good FM.     Objective:     Vital Signs (Most Recent):  Temp: 97.3 °F (36.3 °C) (09/07/17 0406)  Pulse: 90 (09/07/17 0407)  Resp: 16 (09/07/17 0014)  BP: 127/74 (09/07/17 0407)  SpO2: 97 % (09/07/17 0406) Vital Signs (24h Range):  Temp:  [97.2 °F (36.2 °C)-97.9 °F (36.6 °C)] 97.3 °F (36.3 °C)  Pulse:  [] 90  Resp:  [16-18] 16  SpO2:  [77 %-100 %] 97 %  BP: (127-158)/(64-90) 127/74     Weight: 92.6 kg (204 lb 2.3 oz)  Body mass index is 36.17 kg/m².    Intake/Output Summary (Last 24 hours) at 09/07/17 0645  Last data filed at 09/07/17 0000   Gross per 24 hour   Intake             1360 ml   Output             2800 ml   Net            -1440 ml     Significant Labs:  Recent Lab Results     None        FHT: reassuring, 140bpm, mod BTB variability, - accels, occasional variable decels  TOCO: no contractions      Physical Exam:   Constitutional: She is oriented to person, place, and time. She appears well-developed and well-nourished.    HENT:   Head: Normocephalic and atraumatic.     Neck: Normal range of motion.    Cardiovascular: Regular rhythm and normal heart sounds.     Pulmonary/Chest: Effort normal and breath sounds normal. No respiratory distress. She has no wheezes.        Abdominal: Soft. Bowel sounds are normal. She exhibits no distension. There is no tenderness.   Gravid 27w             Musculoskeletal: Normal range of motion and moves all extremeties.   UCHE/SCDs in place       Neurological: She is alert and oriented to person, place, and time. She has normal reflexes.    Skin: Skin is warm and dry. She is not diaphoretic.    Psychiatric: She has a normal mood and affect.

## 2017-09-07 NOTE — PROGRESS NOTES
"[facility]  Adult Nutrition  Progress Note    SUMMARY     Recommendations    Recommendation/Intervention:   1. Continue regular diet   2. Continue prenatal mvi     Goals: Meet >85% EEN and EPN    Nutrition Goal Status: new  Communication of RD Recs: other (comment) (care plan)    Reason for Assessment    Reason for Assessment: RD follow-up  Diagnosis: pregnancy complications (Pre-eclampsia)  Relevent Medical History: thyroid disease, HTN, asthma      General Information Comments: Pt tolerating diet, no N/V/D/C noted.     Nutrition Discharge Planning: d/c on regular diet    Nutrition Prescription Ordered    Current Diet Order: regular    Evaluation of Received Nutrients/Fluid Intake    % Intake of Estimated Energy Needs: 75 - 100 %  % Meal Intake: 100%     Nutrition Risk Screen     Nutrition Risk Screen: no indicators present    Nutrition/Diet History    Patient Reported Diet/Restrictions/Preferences: general  Food Preferences: No cultural/Taoism preferences noted    Labs/Tests/Procedures/Meds       Pertinent Labs Reviewed: reviewed  Pertinent Labs Comments: Unremarkable  Pertinent Medications Reviewed: reviewed  Pertinent Medications Comments: Prenatal mvi    Physical Findings    Overall Physical Appearance: nourished  Oral/Mouth Cavity: WDL  Skin: intact    Anthropometrics    Temp: 98.2 °F (36.8 °C)     Height: 5' 2.99" (160 cm)  Weight Method: Stated  Weight: 92.6 kg (204 lb 2.3 oz)     Ideal Body Weight (IBW), Female: 114.95 lb     % Ideal Body Weight, Female (lb): 177.6 lb  BMI (Calculated): 36.3  BMI Grade: 35 - 39.9 - obesity - grade II     Estimated/Assessed Needs    Weight Used For Calorie Calculations: 93 kg (205 lb 0.4 oz)   Height (cm): 160 cm  Energy Calorie Requirements (kcal): 2330  Energy Need Method: Tensas-St Jeor (stress factor 1.4)  RMR (Tensas-St. Jeor Equation): 1664.12     Weight Used For Protein Calculations: 52.2 kg (115 lb) (IBW)  Protein Requirements:  gm/d " (1.2-1.5 gm/kg IBW)    Fluid Requirements (mL): 2330 (or per team)  Fluid Need Method: RDA Method     Assessment and Plan    No nutrition dx at this time    Monitor and Evaluation    Food and Nutrient Intake: energy intake, food and beverage intake  Food and Nutrient Adminstration: diet order  Anthropometric Measurements: weight, weight change  Biochemical Data, Medical Tests and Procedures: electrolyte and renal panel, gastrointestinal profile, glucose/endocrine profile, inflammatory profile  Nutrition-Focused Physical Findings: overall appearance    Nutrition Risk    Level of Risk: other (see comments) (f/u 1x/wk)    Nutrition Follow-Up    RD Follow-up?: Yes

## 2017-09-07 NOTE — PROGRESS NOTES
[facility]  Obstetrics  Antepartum Progress Note    Patient Name: Myrtle Valdez  MRN: [id number]  Admission Date: 2017  Hospital Length of Stay: 16 days  Attending Physician: Martha Mcdermott MD  Primary Care Provider: Primary Doctor No    Subjective:     Principal Problem:Pre-eclampsia, severe, antepartum    HPI:  Myrtle Valdez is a 22 y.o. [id number] at 25w0d presents as a transport from [facility] secondary to PreE w/SF and FGR with AEDF.    This IUP is complicated by newly diagnosed PreE w/SF (severe range BP), fetal growth restriction (1%, AEDF, fetal hyperechoic bowel and possible VSD), asthma (well controlled, no meds), hx hyperthyroidism (no meds), questionable hx of HepC, hx of chlamydia.    Currently patient has no complaints. Patient denies headache, scotoma, RUQ pain, N/V, CP, SOB. Patient denies contractions, vaginal bleeding or discharge, denies LOF.   Fetal Movement: normal.     Hospital Course:  2017 - admitted to antepartum at 25w0d for newly diagnosed PreE w/SF in a pregnancy complicated by FGR (AEDF). BMZ series and Mag started at OSH. BP remained elevated, started on Procardia 30XL.  2017 - Doing well from PreE standpoint, asymptomatic, labs stable, BP now mild range.  2017 - - Continues to be stable from PrE standpoint. Platelets stable, AST stable. Continue FHR dopplers q shift.  HD7-14: BP regimen changed to Procardia XL 60AM/30PM due to elevated PM BP. Labs stable. Umbilical artery flow remains absent on doppler. Ob glucose screen 169. Will order 3hour GTT this week.   HD15-16: Growth scan 420 g, <1%ile. Continue NST BID. Rescue course of BMZ completed.    Obstetric HPI:  Patient is doing well this mornng and without questions or complaints. She denies symptoms of pre-eclampsia -denies vision changes, SOB, RUQ pain. Denies ctx, VB, LOF. Reports good FM.     Objective:     Vital Signs (Most Recent):  Temp: 97.3 °F (36.3 °C) (17  0406)  Pulse: 90 (17 040)  Resp: 16 (17 0014)  BP: 127/74 (17 0407)  SpO2: 97 % (17 0406) Vital Signs (24h Range):  Temp:  [97.2 °F (36.2 °C)-97.9 °F (36.6 °C)] 97.3 °F (36.3 °C)  Pulse:  [] 90  Resp:  [16-18] 16  SpO2:  [77 %-100 %] 97 %  BP: (127-158)/(64-90) 127/74     Weight: 92.6 kg (204 lb 2.3 oz)  Body mass index is 36.17 kg/m².    Intake/Output Summary (Last 24 hours) at 17 0645  Last data filed at 17 0000   Gross per 24 hour   Intake             1360 ml   Output             2800 ml   Net            -1440 ml     Significant Labs:  Recent Lab Results     None        FHT: reassuring, 140bpm, mod BTB variability, - accels, occasional variable decels  TOCO: no contractions      Physical Exam:   Constitutional: She is oriented to person, place, and time. She appears well-developed and well-nourished.    HENT:   Head: Normocephalic and atraumatic.     Neck: Normal range of motion.    Cardiovascular: Regular rhythm and normal heart sounds.     Pulmonary/Chest: Effort normal and breath sounds normal. No respiratory distress. She has no wheezes.        Abdominal: Soft. Bowel sounds are normal. She exhibits no distension. There is no tenderness.   Gravid 27w             Musculoskeletal: Normal range of motion and moves all extremeties.   UCHE/SCDs in place       Neurological: She is alert and oriented to person, place, and time. She has normal reflexes.    Skin: Skin is warm and dry. She is not diaphoretic.    Psychiatric: She has a normal mood and affect.       Assessment/Plan:     22 y.o. female  at 27w2d for:    * Pre-eclampsia, severe, antepartum    - Pre-E Labs stable ()- P/C: 5.44, AST: 25>16>11>12, Plt:322>310>348>339, Cr:0.6>0.6  - s/p magnesium  - s/p BMZ series  - BP controlled on Procardia XL 60AM/30PM   - Repeat labs on           Tachycardia    - EKG: sinus tach otherwise normal  - asymptomatic  - Transient and resolves spontaneously          26 weeks  gestation of pregnancy    - PNV  - NST BID  - Glucose screen 169 - will monitor fasting glucose, and 2hpp        History of hepatitis C    - Hep C viral load negative  - hepatic function panel normal        Hyperthyroidism    - no meds  - TSH low, FT4 normal  - thyroid antibodies negative        Hx of chlamydia infection    - GC/CT 8/22 was negative        Intrauterine growth restriction affecting antepartum care of mother in second trimester    - Fetal echo negative for VSD  - Work Up: CMV, parvo, toxoplasmosis -->negative  - Urine tox neg  - NST BID. Patient understands risks of FHR monitoring including early delivery  - Rescue course of BMZ completed (9/5-9/6)  - Growth scan (9/5),  g (<1%) with MVP 3.2 cm. Fluid is overall decreased        Asthma    - well controlled, no meds  - continue to monitor              Ramiro Del Valle MD  Obstetrics  [facility]

## 2017-09-07 NOTE — ASSESSMENT & PLAN NOTE
- Fetal echo negative for VSD  - Work Up: CMV, parvo, toxoplasmosis -->negative  - Urine tox neg  - NST BID. Patient understands risks of FHR monitoring including early delivery  - Rescue course of BMZ completed (9/5-9/6)  - Growth scan (9/5),  g (<1%) with MVP 3.2 cm. Fluid is overall decreased

## 2017-09-08 PROCEDURE — 76815 OB US LIMITED FETUS(S): CPT | Mod: 26,,, | Performed by: OBSTETRICS & GYNECOLOGY

## 2017-09-08 PROCEDURE — 25000003 PHARM REV CODE 250: Performed by: OBSTETRICS & GYNECOLOGY

## 2017-09-08 PROCEDURE — 11000001 HC ACUTE MED/SURG PRIVATE ROOM

## 2017-09-08 PROCEDURE — 76820 UMBILICAL ARTERY ECHO: CPT | Mod: 26,,, | Performed by: OBSTETRICS & GYNECOLOGY

## 2017-09-08 PROCEDURE — 59025 FETAL NON-STRESS TEST: CPT | Mod: 26,,, | Performed by: OBSTETRICS & GYNECOLOGY

## 2017-09-08 PROCEDURE — 99232 SBSQ HOSP IP/OBS MODERATE 35: CPT | Mod: 25,,, | Performed by: OBSTETRICS & GYNECOLOGY

## 2017-09-08 RX ADMIN — NIFEDIPINE 60 MG: 30 TABLET, FILM COATED, EXTENDED RELEASE ORAL at 09:09

## 2017-09-08 RX ADMIN — PRENATAL VIT W/ FE FUMARATE-FA TAB 27-0.8 MG 1 EACH: 27-0.8 TAB at 09:09

## 2017-09-08 RX ADMIN — NIFEDIPINE 30 MG: 30 TABLET, FILM COATED, EXTENDED RELEASE ORAL at 09:09

## 2017-09-08 NOTE — PLAN OF CARE
Met with pt for weekly f/u.    Pt and her mom were watching tv. Pt is wearing her own clothes and feeling comfortable being in the hospital. Pt is happy her lunch menu was expanded. Pt has been leaving her room more often and has even spent time on the balcony getting some fresh air. A couple of days ago, pt was stopped by security and told she could not be on the balcony. Pt was disappointed and complained to staff about this. Sw called security to find out if this is a new rule. SW was told by , Eleno, that pt can visit the hospital balcony. Pt nurse notified. No other concerns or complaints. Emotional support provided throughout.     Will cont to f/u.    Adia Muro LCSW    [facility]  [email or web address]    (phone) [phone] or  Ext. [phone]  (fax) [phone]

## 2017-09-08 NOTE — ASSESSMENT & PLAN NOTE
- Pre-E Labs stable (9/5)- P/C: 5.44, AST: 25>16>11>12, Plt:322>310>348>339, Cr:0.6>0.6  - s/p magnesium  - s/p BMZ series  - BP controlled on Procardia XL 60AM/30PM   - Had to give procardia early yesterday  - Repeat labs on 9/12

## 2017-09-08 NOTE — PLAN OF CARE
Problem: Patient Care Overview  Goal: Plan of Care Review  Outcome: Ongoing (interventions implemented as appropriate)  Pt with no complaints today, NST BID, lights off in room most of day. Mother at bedside.

## 2017-09-08 NOTE — PLAN OF CARE
Problem: Patient Care Overview  Goal: Individualization & Mutuality  Outcome: Ongoing (interventions implemented as appropriate)  Patient educated several times on importance of I&O, patient noncompliant. VS stable. +FM. No problems noted.     Problem: Hypertensive Disorders in Pregnancy (Adult,Obstetrics,Pediatric)  Goal: Signs and Symptoms of Listed Potential Problems Will be Absent, Minimized or Managed (Hypertensive Disorders in Pregnancy)  Signs and symptoms of listed potential problems will be absent, minimized or managed by discharge/transition of care (reference Hypertensive Disorders in Pregnancy (Adult,Obstetrics,Pediatric) CPG).   Outcome: Ongoing (interventions implemented as appropriate)  BP elevated in the beginning of the shift. PM blood pressure medication given early. BP stabilized. No complaints is HA, blurred vision, RUQ pain noted.     Problem: High-Risk/Critically Ill Patient (Obstetrics)  Goal: Signs and Symptoms of Listed Potential Problems Will be Absent, Minimized or Managed (High-Risk/Critically Ill Patient)  Signs and symptoms of listed potential problems will be absent, minimized or managed by discharge/transition of care (reference High-Risk/Critically Ill Patient (Obstetrics) CPG).   Outcome: Ongoing (interventions implemented as appropriate)  No problems noted

## 2017-09-08 NOTE — PROGRESS NOTES
[facility]  Obstetrics  Antepartum Progress Note    Patient Name: Myrtle Valdez  MRN: [id number]  Admission Date: 2017  Hospital Length of Stay: 17 days  Attending Physician: Martha Mcdermott MD  Primary Care Provider: Primary Doctor No    Subjective:     Principal Problem:Pre-eclampsia, severe, antepartum    HPI:  Myrtle Valdez is a 22 y.o. [id number] at 25w0d presents as a transport from [facility] secondary to PreE w/SF and FGR with AEDF.    This IUP is complicated by newly diagnosed PreE w/SF (severe range BP), fetal growth restriction (1%, AEDF, fetal hyperechoic bowel and possible VSD), asthma (well controlled, no meds), hx hyperthyroidism (no meds), questionable hx of HepC, hx of chlamydia.    Currently patient has no complaints. Patient denies headache, scotoma, RUQ pain, N/V, CP, SOB. Patient denies contractions, vaginal bleeding or discharge, denies LOF.   Fetal Movement: normal.     Hospital Course:  2017 - admitted to antepartum at 25w0d for newly diagnosed PreE w/SF in a pregnancy complicated by FGR (AEDF). BMZ series and Mag started at OSH. BP remained elevated, started on Procardia 30XL.  2017 - Doing well from PreE standpoint, asymptomatic, labs stable, BP now mild range.  2017 - - Continues to be stable from PrE standpoint. Platelets stable, AST stable. Continue FHR dopplers q shift.  HD7-14: BP regimen changed to Procardia XL 60AM/30PM due to elevated PM BP. Labs stable. Umbilical artery flow remains absent on doppler. Ob glucose screen 169. Will order 3hour GTT this week.   HD15-16: Growth scan 420 g, <1%ile. Continue NST BID. Rescue course of BMZ completed.  HD17: BP to 150 overnight, Procardia given early.    Obstetric HPI:  Patient is doing well this mornng and without questions or complaints. She denies symptoms of pre-eclampsia -denies vision changes, SOB, RUQ pain. Denies ctx, VB, LOF. Reports good FM.     Objective:     Vital Signs (Most  Recent):  Temp: 97 °F (36.1 °C) (17 0401)  Pulse: 72 (17 0401)  Resp: 18 (17)  BP: 130/75 (17 0400)  SpO2: 99 % (17 2359) Vital Signs (24h Range):  Temp:  [97 °F (36.1 °C)-98.8 °F (37.1 °C)] 97 °F (36.1 °C)  Pulse:  [] 72  Resp:  [18] 18  SpO2:  [99 %-100 %] 99 %  BP: (118-159)/(61-97) 130/75     Weight: 92.6 kg (204 lb 2.3 oz)  Body mass index is 36.17 kg/m².    Intake/Output Summary (Last 24 hours) at 17 0702  Last data filed at 17 1900   Gross per 24 hour   Intake              860 ml   Output             1000 ml   Net             -140 ml     Significant Labs:  Recent Lab Results     None        FHT: reassuring, 140bpm, mod BTB variability, - accels, one decel  TOCO: no contractions      Physical Exam:   Constitutional: She is oriented to person, place, and time. She appears well-developed and well-nourished.    HENT:   Head: Normocephalic and atraumatic.     Neck: Normal range of motion.    Cardiovascular: Regular rhythm and normal heart sounds.     Pulmonary/Chest: Effort normal and breath sounds normal. No respiratory distress. She has no wheezes.        Abdominal: Soft. Bowel sounds are normal. She exhibits no distension. There is no tenderness.   Gravid 27w             Musculoskeletal: Normal range of motion and moves all extremeties.   UCHE/SCDs in place       Neurological: She is alert and oriented to person, place, and time. She has normal reflexes.    Skin: Skin is warm and dry. She is not diaphoretic.    Psychiatric: She has a normal mood and affect.       Assessment/Plan:     22 y.o. female  at 27w3d for:    * Pre-eclampsia, severe, antepartum    - Pre-E Labs stable ()- P/C: 5.44, AST: 25>16>11>12, Plt:322>310>348>339, Cr:0.6>0.6  - s/p magnesium  - s/p BMZ series  - BP controlled on Procardia XL 60AM/30PM   - Had to give procardia early yesterday  - Repeat labs on           Tachycardia    - EKG: sinus tach otherwise normal  -  asymptomatic  - Transient and resolves spontaneously          26 weeks gestation of pregnancy    - PNV  - NST BID  - Glucose screen 169 - will monitor fasting glucose, and 2hpp        History of hepatitis C    - Hep C viral load negative  - hepatic function panel normal        Hyperthyroidism    - no meds  - TSH low, FT4 normal  - thyroid antibodies negative        Hx of chlamydia infection    - GC/CT 8/22 was negative        Intrauterine growth restriction affecting antepartum care of mother in second trimester    - Fetal echo negative for VSD  - Work Up: CMV, parvo, toxoplasmosis -->negative  - Urine tox neg  - NST BID. Patient understands risks of FHR monitoring including early delivery  - Rescue course of BMZ completed (9/5-9/6)  - Growth scan (9/5),  g (<1%) with MVP 3.2 cm. Fluid is overall decreased        Asthma    - well controlled, no meds  - continue to monitor              Ramiro Del Valle MD  Obstetrics  [facility]

## 2017-09-08 NOTE — SUBJECTIVE & OBJECTIVE
Obstetric HPI:  Patient is doing well this mornng and without questions or complaints. She denies symptoms of pre-eclampsia -denies vision changes, SOB, RUQ pain. Denies ctx, VB, LOF. Reports good FM.     Objective:     Vital Signs (Most Recent):  Temp: 97 °F (36.1 °C) (09/08/17 0401)  Pulse: 72 (09/08/17 0401)  Resp: 18 (09/07/17 2129)  BP: 130/75 (09/08/17 0400)  SpO2: 99 % (09/07/17 2359) Vital Signs (24h Range):  Temp:  [97 °F (36.1 °C)-98.8 °F (37.1 °C)] 97 °F (36.1 °C)  Pulse:  [] 72  Resp:  [18] 18  SpO2:  [99 %-100 %] 99 %  BP: (118-159)/(61-97) 130/75     Weight: 92.6 kg (204 lb 2.3 oz)  Body mass index is 36.17 kg/m².    Intake/Output Summary (Last 24 hours) at 09/08/17 0702  Last data filed at 09/07/17 1900   Gross per 24 hour   Intake              860 ml   Output             1000 ml   Net             -140 ml     Significant Labs:  Recent Lab Results     None        FHT: reassuring, 140bpm, mod BTB variability, - accels, one decel  TOCO: no contractions      Physical Exam:   Constitutional: She is oriented to person, place, and time. She appears well-developed and well-nourished.    HENT:   Head: Normocephalic and atraumatic.     Neck: Normal range of motion.    Cardiovascular: Regular rhythm and normal heart sounds.     Pulmonary/Chest: Effort normal and breath sounds normal. No respiratory distress. She has no wheezes.        Abdominal: Soft. Bowel sounds are normal. She exhibits no distension. There is no tenderness.   Gravid 27w             Musculoskeletal: Normal range of motion and moves all extremeties.   UCHE/SCDs in place       Neurological: She is alert and oriented to person, place, and time. She has normal reflexes.    Skin: Skin is warm and dry. She is not diaphoretic.    Psychiatric: She has a normal mood and affect.

## 2017-09-09 PROBLEM — O14.10 PREECLAMPSIA, SEVERE: Status: ACTIVE | Noted: 2017-09-09

## 2017-09-09 PROBLEM — Z98.891 HISTORY OF CLASSICAL CESAREAN SECTION: Status: ACTIVE | Noted: 2017-09-09

## 2017-09-09 PROBLEM — Z98.891 STATUS POST CESAREAN SECTION: Status: ACTIVE | Noted: 2017-09-09

## 2017-09-09 LAB
ABO + RH BLD: NORMAL
ALLENS TEST: ABNORMAL
BASOPHILS # BLD AUTO: 0.02 K/UL
BASOPHILS NFR BLD: 0.1 %
BLD GP AB SCN CELLS X3 SERPL QL: NORMAL
DIFFERENTIAL METHOD: ABNORMAL
EOSINOPHIL # BLD AUTO: 0.1 K/UL
EOSINOPHIL NFR BLD: 0.6 %
ERYTHROCYTE [DISTWIDTH] IN BLOOD BY AUTOMATED COUNT: 12.8 %
HCO3 UR-SCNC: 23.3 MMOL/L (ref 24–28)
HCT VFR BLD AUTO: 36 %
HGB BLD-MCNC: 12.2 G/DL
LYMPHOCYTES # BLD AUTO: 2.7 K/UL
LYMPHOCYTES NFR BLD: 11.8 %
MCH RBC QN AUTO: 28 PG
MCHC RBC AUTO-ENTMCNC: 33.9 G/DL
MCV RBC AUTO: 83 FL
MONOCYTES # BLD AUTO: 1.8 K/UL
MONOCYTES NFR BLD: 7.6 %
NEUTROPHILS # BLD AUTO: 18.5 K/UL
NEUTROPHILS NFR BLD: 79.4 %
PCO2 BLDA: 61.3 MMHG (ref 35–45)
PH SMN: 7.19 [PH] (ref 7.35–7.45)
PLATELET # BLD AUTO: 361 K/UL
PMV BLD AUTO: 10.2 FL
PO2 BLDA: 16 MMHG (ref 80–100)
POC BE: -5 MMOL/L
POC SATURATED O2: 14 % (ref 95–100)
RBC # BLD AUTO: 4.35 M/UL
SAMPLE: ABNORMAL
SITE: ABNORMAL
WBC # BLD AUTO: 23.21 K/UL

## 2017-09-09 PROCEDURE — 25000003 PHARM REV CODE 250: Performed by: ANESTHESIOLOGY

## 2017-09-09 PROCEDURE — 36415 COLL VENOUS BLD VENIPUNCTURE: CPT

## 2017-09-09 PROCEDURE — 99232 SBSQ HOSP IP/OBS MODERATE 35: CPT | Mod: 25,,, | Performed by: OBSTETRICS & GYNECOLOGY

## 2017-09-09 PROCEDURE — 88305 TISSUE EXAM BY PATHOLOGIST: CPT | Performed by: PATHOLOGY

## 2017-09-09 PROCEDURE — 59510 CESAREAN DELIVERY: CPT | Mod: ,,, | Performed by: ANESTHESIOLOGY

## 2017-09-09 PROCEDURE — 88307 TISSUE EXAM BY PATHOLOGIST: CPT | Mod: 26,,, | Performed by: PATHOLOGY

## 2017-09-09 PROCEDURE — 86592 SYPHILIS TEST NON-TREP QUAL: CPT

## 2017-09-09 PROCEDURE — 87340 HEPATITIS B SURFACE AG IA: CPT

## 2017-09-09 PROCEDURE — 37000008 HC ANESTHESIA 1ST 15 MINUTES: Performed by: OBSTETRICS & GYNECOLOGY

## 2017-09-09 PROCEDURE — 63600175 PHARM REV CODE 636 W HCPCS: Performed by: ANESTHESIOLOGY

## 2017-09-09 PROCEDURE — 86900 BLOOD TYPING SEROLOGIC ABO: CPT

## 2017-09-09 PROCEDURE — 82803 BLOOD GASES ANY COMBINATION: CPT

## 2017-09-09 PROCEDURE — 0UB50ZZ EXCISION OF RIGHT FALLOPIAN TUBE, OPEN APPROACH: ICD-10-PCS | Performed by: OBSTETRICS & GYNECOLOGY

## 2017-09-09 PROCEDURE — 11000001 HC ACUTE MED/SURG PRIVATE ROOM

## 2017-09-09 PROCEDURE — 63600175 PHARM REV CODE 636 W HCPCS: Performed by: STUDENT IN AN ORGANIZED HEALTH CARE EDUCATION/TRAINING PROGRAM

## 2017-09-09 PROCEDURE — 86901 BLOOD TYPING SEROLOGIC RH(D): CPT

## 2017-09-09 PROCEDURE — S0028 INJECTION, FAMOTIDINE, 20 MG: HCPCS | Performed by: ANESTHESIOLOGY

## 2017-09-09 PROCEDURE — 85025 COMPLETE CBC W/AUTO DIFF WBC: CPT

## 2017-09-09 PROCEDURE — 25000003 PHARM REV CODE 250: Performed by: OBSTETRICS & GYNECOLOGY

## 2017-09-09 PROCEDURE — 59025 FETAL NON-STRESS TEST: CPT | Mod: 26,,, | Performed by: OBSTETRICS & GYNECOLOGY

## 2017-09-09 PROCEDURE — 99900035 HC TECH TIME PER 15 MIN (STAT)

## 2017-09-09 PROCEDURE — 59514 CESAREAN DELIVERY ONLY: CPT | Mod: AT,,, | Performed by: OBSTETRICS & GYNECOLOGY

## 2017-09-09 PROCEDURE — 88305 TISSUE EXAM BY PATHOLOGIST: CPT | Mod: 26,,, | Performed by: PATHOLOGY

## 2017-09-09 PROCEDURE — 37000009 HC ANESTHESIA EA ADD 15 MINS: Performed by: OBSTETRICS & GYNECOLOGY

## 2017-09-09 PROCEDURE — 25000003 PHARM REV CODE 250: Performed by: STUDENT IN AN ORGANIZED HEALTH CARE EDUCATION/TRAINING PROGRAM

## 2017-09-09 PROCEDURE — 86762 RUBELLA ANTIBODY: CPT

## 2017-09-09 PROCEDURE — 27200688 HC TRAY, SPINAL-HYPER/ ISOBARIC: Performed by: ANESTHESIOLOGY

## 2017-09-09 RX ORDER — SODIUM CHLORIDE, SODIUM LACTATE, POTASSIUM CHLORIDE, CALCIUM CHLORIDE 600; 310; 30; 20 MG/100ML; MG/100ML; MG/100ML; MG/100ML
INJECTION, SOLUTION INTRAVENOUS CONTINUOUS
Status: DISCONTINUED | OUTPATIENT
Start: 2017-09-09 | End: 2017-09-11

## 2017-09-09 RX ORDER — MISOPROSTOL 200 UG/1
800 TABLET ORAL
Status: DISCONTINUED | OUTPATIENT
Start: 2017-09-09 | End: 2017-09-11

## 2017-09-09 RX ORDER — ACETAMINOPHEN 10 MG/ML
INJECTION, SOLUTION INTRAVENOUS
Status: DISCONTINUED | OUTPATIENT
Start: 2017-09-09 | End: 2017-09-09

## 2017-09-09 RX ORDER — OXYCODONE HYDROCHLORIDE 5 MG/1
5 TABLET ORAL EVERY 4 HOURS PRN
Status: ACTIVE | OUTPATIENT
Start: 2017-09-09 | End: 2017-09-10

## 2017-09-09 RX ORDER — MAGNESIUM SULFATE HEPTAHYDRATE 40 MG/ML
2 INJECTION, SOLUTION INTRAVENOUS ONCE
Status: DISCONTINUED | OUTPATIENT
Start: 2017-09-09 | End: 2017-09-12

## 2017-09-09 RX ORDER — MAGNESIUM SULFATE HEPTAHYDRATE 40 MG/ML
2 INJECTION, SOLUTION INTRAVENOUS ONCE
Status: COMPLETED | OUTPATIENT
Start: 2017-09-09 | End: 2017-09-09

## 2017-09-09 RX ORDER — ACETAMINOPHEN 325 MG/1
650 TABLET ORAL EVERY 6 HOURS
Status: COMPLETED | OUTPATIENT
Start: 2017-09-09 | End: 2017-09-10

## 2017-09-09 RX ORDER — CARBOPROST TROMETHAMINE 250 UG/ML
INJECTION, SOLUTION INTRAMUSCULAR
Status: DISCONTINUED
Start: 2017-09-09 | End: 2017-09-09 | Stop reason: WASHOUT

## 2017-09-09 RX ORDER — FAMOTIDINE 10 MG/ML
20 INJECTION INTRAVENOUS
Status: DISCONTINUED | OUTPATIENT
Start: 2017-09-09 | End: 2017-09-11

## 2017-09-09 RX ORDER — SODIUM CITRATE AND CITRIC ACID MONOHYDRATE 334; 500 MG/5ML; MG/5ML
30 SOLUTION ORAL ONCE
Status: DISCONTINUED | OUTPATIENT
Start: 2017-09-09 | End: 2017-09-11

## 2017-09-09 RX ORDER — ACETAMINOPHEN 325 MG/1
650 TABLET ORAL EVERY 6 HOURS
Status: DISCONTINUED | OUTPATIENT
Start: 2017-09-09 | End: 2017-09-09

## 2017-09-09 RX ORDER — MAGNESIUM SULFATE HEPTAHYDRATE 40 MG/ML
2 INJECTION, SOLUTION INTRAVENOUS CONTINUOUS
Status: DISCONTINUED | OUTPATIENT
Start: 2017-09-09 | End: 2017-09-10

## 2017-09-09 RX ORDER — FENTANYL CITRATE 50 UG/ML
INJECTION, SOLUTION INTRAMUSCULAR; INTRAVENOUS
Status: DISCONTINUED | OUTPATIENT
Start: 2017-09-09 | End: 2017-09-09

## 2017-09-09 RX ORDER — OXYCODONE HYDROCHLORIDE 5 MG/1
5 TABLET ORAL EVERY 4 HOURS PRN
Status: DISCONTINUED | OUTPATIENT
Start: 2017-09-09 | End: 2017-09-09

## 2017-09-09 RX ORDER — PHENYLEPHRINE HYDROCHLORIDE 10 MG/ML
INJECTION INTRAVENOUS
Status: DISCONTINUED | OUTPATIENT
Start: 2017-09-09 | End: 2017-09-09

## 2017-09-09 RX ORDER — OXYCODONE HYDROCHLORIDE 5 MG/1
10 TABLET ORAL EVERY 4 HOURS PRN
Status: DISCONTINUED | OUTPATIENT
Start: 2017-09-09 | End: 2017-09-09

## 2017-09-09 RX ORDER — OXYTOCIN 10 [USP'U]/ML
INJECTION, SOLUTION INTRAMUSCULAR; INTRAVENOUS
Status: DISCONTINUED | OUTPATIENT
Start: 2017-09-09 | End: 2017-09-09

## 2017-09-09 RX ORDER — BUPIVACAINE HYDROCHLORIDE 7.5 MG/ML
INJECTION, SOLUTION INTRASPINAL
Status: DISCONTINUED | OUTPATIENT
Start: 2017-09-09 | End: 2017-09-09

## 2017-09-09 RX ORDER — KETOROLAC TROMETHAMINE 30 MG/ML
INJECTION, SOLUTION INTRAMUSCULAR; INTRAVENOUS
Status: DISCONTINUED | OUTPATIENT
Start: 2017-09-09 | End: 2017-09-09

## 2017-09-09 RX ORDER — MAGNESIUM SULFATE HEPTAHYDRATE 40 MG/ML
INJECTION, SOLUTION INTRAVENOUS CONTINUOUS PRN
Status: DISCONTINUED | OUTPATIENT
Start: 2017-09-09 | End: 2017-09-09

## 2017-09-09 RX ORDER — METHYLERGONOVINE MALEATE 0.2 MG/ML
INJECTION INTRAVENOUS
Status: DISCONTINUED
Start: 2017-09-09 | End: 2017-09-09 | Stop reason: WASHOUT

## 2017-09-09 RX ORDER — ONDANSETRON HYDROCHLORIDE 2 MG/ML
INJECTION, SOLUTION INTRAMUSCULAR; INTRAVENOUS
Status: DISCONTINUED | OUTPATIENT
Start: 2017-09-09 | End: 2017-09-09

## 2017-09-09 RX ORDER — OXYTOCIN 10 [USP'U]/ML
INJECTION, SOLUTION INTRAMUSCULAR; INTRAVENOUS
Status: DISCONTINUED
Start: 2017-09-09 | End: 2017-09-09 | Stop reason: WASHOUT

## 2017-09-09 RX ORDER — MIDAZOLAM HYDROCHLORIDE 1 MG/ML
INJECTION INTRAMUSCULAR; INTRAVENOUS
Status: DISCONTINUED | OUTPATIENT
Start: 2017-09-09 | End: 2017-09-09

## 2017-09-09 RX ORDER — KETOROLAC TROMETHAMINE 30 MG/ML
30 INJECTION, SOLUTION INTRAMUSCULAR; INTRAVENOUS EVERY 6 HOURS
Status: DISCONTINUED | OUTPATIENT
Start: 2017-09-09 | End: 2017-09-09

## 2017-09-09 RX ORDER — KETOROLAC TROMETHAMINE 30 MG/ML
30 INJECTION, SOLUTION INTRAMUSCULAR; INTRAVENOUS EVERY 6 HOURS
Status: COMPLETED | OUTPATIENT
Start: 2017-09-09 | End: 2017-09-10

## 2017-09-09 RX ORDER — SODIUM CHLORIDE, SODIUM LACTATE, POTASSIUM CHLORIDE, CALCIUM CHLORIDE 600; 310; 30; 20 MG/100ML; MG/100ML; MG/100ML; MG/100ML
1000 INJECTION, SOLUTION INTRAVENOUS CONTINUOUS
Status: DISCONTINUED | OUTPATIENT
Start: 2017-09-09 | End: 2017-09-11

## 2017-09-09 RX ORDER — MISOPROSTOL 200 UG/1
TABLET ORAL
Status: DISPENSED
Start: 2017-09-09 | End: 2017-09-10

## 2017-09-09 RX ORDER — OXYCODONE HYDROCHLORIDE 5 MG/1
10 TABLET ORAL EVERY 4 HOURS PRN
Status: DISPENSED | OUTPATIENT
Start: 2017-09-09 | End: 2017-09-10

## 2017-09-09 RX ORDER — SODIUM CHLORIDE, SODIUM LACTATE, POTASSIUM CHLORIDE, CALCIUM CHLORIDE 600; 310; 30; 20 MG/100ML; MG/100ML; MG/100ML; MG/100ML
INJECTION, SOLUTION INTRAVENOUS CONTINUOUS PRN
Status: DISCONTINUED | OUTPATIENT
Start: 2017-09-09 | End: 2017-09-09

## 2017-09-09 RX ORDER — SODIUM CITRATE AND CITRIC ACID MONOHYDRATE 334; 500 MG/5ML; MG/5ML
30 SOLUTION ORAL
Status: DISCONTINUED | OUTPATIENT
Start: 2017-09-09 | End: 2017-09-11

## 2017-09-09 RX ORDER — HYDROMORPHONE HYDROCHLORIDE 2 MG/ML
INJECTION, SOLUTION INTRAMUSCULAR; INTRAVENOUS; SUBCUTANEOUS
Status: DISCONTINUED | OUTPATIENT
Start: 2017-09-09 | End: 2017-09-09

## 2017-09-09 RX ORDER — ONDANSETRON 2 MG/ML
4 INJECTION INTRAMUSCULAR; INTRAVENOUS EVERY 6 HOURS PRN
Status: DISCONTINUED | OUTPATIENT
Start: 2017-09-09 | End: 2017-09-13 | Stop reason: HOSPADM

## 2017-09-09 RX ORDER — CALCIUM GLUCONATE 98 MG/ML
1 INJECTION, SOLUTION INTRAVENOUS
Status: DISCONTINUED | OUTPATIENT
Start: 2017-09-09 | End: 2017-09-13 | Stop reason: HOSPADM

## 2017-09-09 RX ORDER — FAMOTIDINE 10 MG/ML
20 INJECTION INTRAVENOUS ONCE
Status: COMPLETED | OUTPATIENT
Start: 2017-09-09 | End: 2017-09-09

## 2017-09-09 RX ADMIN — FAMOTIDINE 20 MG: 10 INJECTION, SOLUTION INTRAVENOUS at 02:09

## 2017-09-09 RX ADMIN — SODIUM CHLORIDE, SODIUM LACTATE, POTASSIUM CHLORIDE, AND CALCIUM CHLORIDE: .6; .31; .03; .02 INJECTION, SOLUTION INTRAVENOUS at 01:09

## 2017-09-09 RX ADMIN — OXYCODONE HYDROCHLORIDE 5 MG: 5 TABLET ORAL at 05:09

## 2017-09-09 RX ADMIN — MAGNESIUM SULFATE IN WATER 2 G: 40 INJECTION, SOLUTION INTRAVENOUS at 01:09

## 2017-09-09 RX ADMIN — MAGNESIUM SULFATE IN WATER 2 G: 40 INJECTION, SOLUTION INTRAVENOUS at 02:09

## 2017-09-09 RX ADMIN — SODIUM CHLORIDE, SODIUM LACTATE, POTASSIUM CHLORIDE, AND CALCIUM CHLORIDE: 600; 310; 30; 20 INJECTION, SOLUTION INTRAVENOUS at 02:09

## 2017-09-09 RX ADMIN — PHENYLEPHRINE HYDROCHLORIDE 100 MCG: 10 INJECTION INTRAVENOUS at 02:09

## 2017-09-09 RX ADMIN — NIFEDIPINE 30 MG: 30 TABLET, FILM COATED, EXTENDED RELEASE ORAL at 09:09

## 2017-09-09 RX ADMIN — PRENATAL VIT W/ FE FUMARATE-FA TAB 27-0.8 MG 1 EACH: 27-0.8 TAB at 08:09

## 2017-09-09 RX ADMIN — ACETAMINOPHEN 650 MG: 325 TABLET ORAL at 09:09

## 2017-09-09 RX ADMIN — FENTANYL CITRATE 10 MCG: 50 INJECTION, SOLUTION INTRAMUSCULAR; INTRAVENOUS at 02:09

## 2017-09-09 RX ADMIN — DEXTROSE 2 G: 50 INJECTION, SOLUTION INTRAVENOUS at 02:09

## 2017-09-09 RX ADMIN — MAGNESIUM SULFATE IN WATER 2 G/HR: 40 INJECTION, SOLUTION INTRAVENOUS at 02:09

## 2017-09-09 RX ADMIN — OXYTOCIN 3 UNITS: 10 INJECTION, SOLUTION INTRAMUSCULAR; INTRAVENOUS at 03:09

## 2017-09-09 RX ADMIN — MIDAZOLAM HYDROCHLORIDE 2 MG: 1 INJECTION, SOLUTION INTRAMUSCULAR; INTRAVENOUS at 02:09

## 2017-09-09 RX ADMIN — KETOROLAC TROMETHAMINE 30 MG: 30 INJECTION, SOLUTION INTRAMUSCULAR; INTRAVENOUS at 03:09

## 2017-09-09 RX ADMIN — PHENYLEPHRINE HYDROCHLORIDE 200 MCG: 10 INJECTION INTRAVENOUS at 02:09

## 2017-09-09 RX ADMIN — NIFEDIPINE 60 MG: 30 TABLET, FILM COATED, EXTENDED RELEASE ORAL at 08:09

## 2017-09-09 RX ADMIN — SODIUM CITRATE AND CITRIC ACID MONOHYDRATE 30 ML: 500; 334 SOLUTION ORAL at 02:09

## 2017-09-09 RX ADMIN — ACETAMINOPHEN 1000 MG: 10 INJECTION, SOLUTION INTRAVENOUS at 03:09

## 2017-09-09 RX ADMIN — ONDANSETRON 4 MG: 2 INJECTION, SOLUTION INTRAMUSCULAR; INTRAVENOUS at 03:09

## 2017-09-09 RX ADMIN — HYDROMORPHONE HYDROCHLORIDE 65 MCG: 2 INJECTION, SOLUTION INTRAMUSCULAR; INTRAVENOUS; SUBCUTANEOUS at 02:09

## 2017-09-09 RX ADMIN — KETOROLAC TROMETHAMINE 30 MG: 30 INJECTION, SOLUTION INTRAMUSCULAR at 09:09

## 2017-09-09 RX ADMIN — OXYTOCIN 7 UNITS: 10 INJECTION, SOLUTION INTRAMUSCULAR; INTRAVENOUS at 03:09

## 2017-09-09 RX ADMIN — BUPIVACAINE HYDROCHLORIDE IN DEXTROSE 1.6 ML: 7.5 INJECTION, SOLUTION SUBARACHNOID at 02:09

## 2017-09-09 NOTE — ASSESSMENT & PLAN NOTE
- Pre-E Labs stable (9/5)- P/C: 5.44, AST: 25>16>11>12, Plt:322>310>348>339, Cr:0.6>0.6  - s/p magnesium  - s/p BMZ series  - BP controlled on Procardia XL 60AM/30PM  - BP: (126-151)/(63-90) 131/75  - Repeat labs on 9/12

## 2017-09-09 NOTE — PLAN OF CARE
Problem: Patient Care Overview  Goal: Plan of Care Review  Outcome: Ongoing (interventions implemented as appropriate)  Pt lying in bed in no apparent distress, oriented to staff and communication board updated. Pt denies CTX, bleeding or LOF. Patient VSS overnight. Call bell in reach, bed locked and low. Will continue to monitor.

## 2017-09-09 NOTE — PLAN OF CARE
Problem: Patient Care Overview  Goal: Plan of Care Review  Pt delivered emergently at 1457 via .  Baby immediately intubated and taken to NICU.  Pt brought back to room 13 for recovery.  Vaginal bleeding light.  Magnesium infusion still running at 2g/hr per Dr. Tierney.  Percocet 10 mg administered at 1720 for incisional pain.  Will continue to monitor closely.

## 2017-09-09 NOTE — PROGRESS NOTES
NST this AM reviewed Baseline 145-150, minimal variability, non-reactive with frequent small variable decelerations and one late appearing deceleration not associated with a contraction.    Given the borderline fluid yesterday, performed a repeat bedside ultrasound today.    Now with severe oligohydramnios and only measurable pocket of fluid 1.8 cm. Fetal movement and breathing were seen but umbilical artery Doppler flow now shows reversed end diastolic flow.     Given the worsening Dopplers, decreasing amniotic fluid, increased frequency of decelerations on monitoring in the setting of severe IUGR and severe pre-e, I do not think there is anything to be gained from attempts at further pregnancy prolongation.    Discussed this with Myrtle and her mother. Explained rationale for delivery at this juncture and the potential for fetal demise with continued expectant mgmt. Explained that there remains a risk of significant  morbidity and mortality but that the survival has improved since she first was counseled by Dr. Bruno several weeks ago.     We discussed  delivery, risks, benefits and indications.     We place on CFM, Magnesium for neuroprotection, and get pre-op labs. Anesthesia, L&D and Rolando notified. She last ate at 10 am so will try to wait 8 hours but if tracing deteriorates at all will move sooner.

## 2017-09-09 NOTE — PROGRESS NOTES
Mother would like to pump for her baby. Mother encouraged to provide  breastmilk by pumping. Benefits of breastmilk discussed. Breastpump initiated at 1731. Mother educated on pump use, cleaning pump parts, labeling containers and storage of breastmilk. Mother to call her nurse with further questions.

## 2017-09-09 NOTE — L&D DELIVERY NOTE
[facility]   Section   Operative Note    SUMMARY     Date of Procedure: 2017     Procedure: Procedure(s) (LRB):  DELIVERY- SECTION (N/A)    Surgeon(s) and Role:     * Muna Salguero MD - Primary        * Aspen Olsen MD - Resident - Assisting    Pre-Operative Diagnosis:   1. Preeclampsia, severe [O14.10]  2. Pregnancy affected by fetal growth restriction [O36.5990]  3. Reversal of end diastolic flow  4. Nonreassuring fetal heart tones    Post-Operative Diagnosis:   Same  5. S/p Classical  Section    PROCEDURE: Primary classical  section via Pfannenstiel incision. Right paratubal cyst excision.    Anesthesia: Spinal           Complications: No    Blood Loss: 350 mL     UOP: 200 mL    FLUIDS: 500 mL LR    INDICATIONS: Ms. Valdez is a 22 [id number] female with IUP 27w4d weeks' gestational age with PreE w SF (BP), FGR, hyperechoic fetal bowel who was taken to the OR for emergent 1CCS 2/2 REDF and NRFHT. The patient had been thoroughly counseled and agreed with this plan.    FINDINGS: Uterus approximately 12cm. Male infant in cephalic presentation. Placenta appears infarcted with eccentric cord insertion. Infant weighing 460g with Apgars of 2 and 9 at 1 and 5 minutes respectively. Right paratubal cysts noted and excised. Otherwise normal tubes and ovaries.     PROCEDURE IN DETAIL: The patient was taken back to the Operating Room where anesthesia was found to be adequate. The patient was prepped and draped in normal sterile fashion in dorsal supine position with leftward tilt.  Skin incision was made with scalpel and this incision was taken down to the underlying fascia with the bovie. Incision was made in the midline of the fascia with inside knife; the incision was extended laterally using curved Ferreira scissors on both sides. Using Kocher clamps, the superior aspect of the fascia was grasped and tented up and the underlying rectus muscle was   off the fascia bluntly with the assistance of curved Ferreira scissors. In a similar fashion, the inferior aspect of the fascia was grasped with Kocher's, tented up and the underlying rectus muscle was  off bluntly with the assistance of curved Ferreira scissors. The muscle was then incised in midline and  bluntly. Peritoneum was identified, entered bluntly, and then extended superiorly and inferiorly bluntly. Bladder blade was inserted. A vertical incision was made on the uterus with a scalpel. The infant was found to be in a cephalic presentation. The amniotic sac was ruptured with a hemostat. The head was then delivered atraumatically and the infant shortly followed. The cord was clamped and cut and the infant was  handed off to the awaiting NICU team. Cord gas and blood were collected and sent.   The placenta was then delivered atraumatically with findings as above. The uterus was lifted out of the pelvis and the hysterotomy was then closed in a three-layer fashion using #1 chromic in a running locked fashion x 2 with a baseball stitch on the serosa. Good hemostasis was noted at both the angles and at the repaired hysterotomy incision. The uterus was then replaced into the abdomen and the gutters were irrigated and cleared of all clots. The peritoneum and muscle were then reapproximated using #1 chromic in a mattress fashion. The fascia was then reapproximated using 0 PDS in a running fashion and tied at the midline. Good hemostasis was noted throughout. The subcutaneous tissue was reapproximated using 2-0 vicryl in an interrupted fashion. The skin was then closed using 4-0 Monocryl in a running fashion.   Sponge, lap, and needle counts were good x 2. The patient tolerated the procedure well and was transferred over to the recovery area in stable condition.    **THIS PATIENT IS NOT A CANDIDATE FOR **         Specimens:   Specimen (12h ago through future)    Start     Ordered    17 6125   Specimen to Pathology - Surgery  Once     Comments:  Right paratubal cyst      17 1544    17 1542  Specimen to Pathology - Surgery  Once     Comments:  Placenta - 27w4d, reverse end diastolic flow of umbilical artery      17 1544          Condition: Good         Delivery Information for  Bryce Valdez    Birth information:  YOB: 2016   Time of birth: 2:57 PM   Sex: male   Head Delivery Date/Time: 2017  2:57 PM   Delivery type: , Classical   Gestational Age: 27w4d    Delivery Providers    Delivering clinician:  Muna Salguero MD   Other personnel:   Provider Role   Aspen Olsen MD Resident   Gena Mathis, RN Registered Nurse   Dhara Adriana [facility] Tech                    Assessment    Living status:  Living  Apgars:     1 Minute:   5 Minute:   10 Minute:   15 Minute:   20 Minute:     Skin Color:   0  1       Heart Rate:   1  2       Reflex Irritability:   0  2       Muscle Tone:   1  2       Respiratory Effort:   0  2       Total:   2  9               Apgars Assigned By:  NICU TEAM         Assisted Delivery Details:    Forceps attempted?:  No  Vacuum extractor attempted?:  No         Shoulder Dystocia    Shoulder dystocia present?:  No           Presentation and Position    Presentation:   Vertex   Position:   Middle                Interventions/Resuscitation    Method:  NICU Attended       Cord    Vessels:  3 vessels  Complications:  None  Delayed Cord Clamping?:  No  Cord Clamped Date/Time:  2017  2:57 PM  Cord Blood Disposition:  Sent with Baby  Gases Sent?:  Yes  Stem Cell Collection (by MD):  No       Placenta    Date and time:  2017  2:59 PM  Removal:  Manual removal  Appearance:  Intact  Placenta disposition:  pathology           Labor Events:       labor: Yes     Labor Onset Date/Time:         Dilation Complete Date/Time:         Start Pushing Date/Time:       Rupture Date/Time:              Rupture type:           Fluid  Amount:        Fluid Color:        Fluid Odor:        Membrane Status (PeriCalm): INT (Intact)      Rupture Date/Time (PeriCalm):        Fluid Amount (PeriCalm):        Fluid Color (PeriCalm):         steroids: Full Course     Antibiotics given for GBS: No     Induction: none     Indications for induction:        Augmentation:       Indications for augmentation:       Labor complications: None     Additional complications:          Cervical ripening:                     Delivery:      Episiotomy: None     Indication for Episiotomy:       Perineal Lacerations: None Repaired:      Periurethral Laceration: none Repaired:     Labial Laceration: none Repaired:     Sulcus Laceration: none Repaired:     Vaginal Laceration: No Repaired:     Cervical Laceration: No Repaired:     Repair suture: None     Repair # of packets:       Vaginal delivery QBL (mL):        QBL (mL): 350     Combined Blood Loss (mL): 350     Vaginal Sweep Performed: No     Surgicount Correct: Yes       Other providers:       Anesthesia    Method:  Spinal          Details (if applicable):  Trial of Labor No    Categorization: Primary    Priority: Emergency   Indications for : Fetal Intolerance of Labor   Incision Type: classical     Additional  information:  Forceps:    Vacuum:    Breech:    Observed anomalies    Other (Comments):         Aspen Olsen MD  PGY-3 OB/GYN  [phone]    I was present for the entire procedure/operation and agree with above resident documentation.     Muna Salguero

## 2017-09-09 NOTE — ANESTHESIA PROCEDURE NOTES
Spinal    Diagnosis: labor  Patient location during procedure: OR  Start time: 9/9/2017 2:46 PM  Timeout: 9/9/2017 2:45 PM  End time: 9/9/2017 2:50 PM  Staffing  Anesthesiologist: ROC REICH  Other anesthesia staff: NICKOLAS CASTANEDA  Performed: other anesthesia staff   Preanesthetic Checklist  Completed: patient identified, site marked, surgical consent, pre-op evaluation, timeout performed, IV checked, risks and benefits discussed and monitors and equipment checked  Spinal Block  Patient position: sitting  Prep: ChloraPrep  Patient monitoring: continuous pulse ox and heart rate  Approach: midline  Location: L3-4  Injection technique: single shot  CSF Fluid: clear free-flowing CSF  Needle  Needle type: pencil-tip   Needle gauge: 25 G  Needle length: 5 in  Additional Documentation: incremental injection, negative aspiration for heme and no paresthesia on injection  Needle localization: anatomical landmarks  Assessment  Sensory level: T4   Dermatomal levels determined by pinch or prick  Ease of block: easy  Patient's tolerance of the procedure: comfortable throughout block and no complaints  Medications:  Bolus administered: 1.6 mL of 0.75 and with dextrose bupivacaine  Epinephrine added: none fentanyl and dilaudid  Additional Notes  10mcg fentanyl  650mcg dilaudid

## 2017-09-09 NOTE — PROGRESS NOTES
[facility]  Obstetrics  Antepartum Progress Note    Patient Name: Myrtle Valdez  MRN: [id number]  Admission Date: 2017  Hospital Length of Stay: 18 days  Attending Physician: Martha Mcdermott MD  Primary Care Provider: Primary Doctor No    Subjective:     Principal Problem:Pre-eclampsia, severe, antepartum    HPI:  Myrtle Valdez is a 22 y.o. [id number] at 25w0d presents as a transport from [facility] secondary to PreE w/SF and FGR with AEDF.    This IUP is complicated by newly diagnosed PreE w/SF (severe range BP), fetal growth restriction (1%, AEDF, fetal hyperechoic bowel and possible VSD), asthma (well controlled, no meds), hx hyperthyroidism (no meds), questionable hx of HepC, hx of chlamydia.    Currently patient has no complaints. Patient denies headache, scotoma, RUQ pain, N/V, CP, SOB. Patient denies contractions, vaginal bleeding or discharge, denies LOF.   Fetal Movement: normal.     Hospital Course:  2017 - admitted to antepartum at 25w0d for newly diagnosed PreE w/SF in a pregnancy complicated by FGR (AEDF). BMZ series and Mag started at OSH. BP remained elevated, started on Procardia 30XL.  2017 - Doing well from PreE standpoint, asymptomatic, labs stable, BP now mild range.  2017 - - Continues to be stable from PrE standpoint. Platelets stable, AST stable. Continue FHR dopplers q shift.  HD7-14: BP regimen changed to Procardia XL 60AM/30PM due to elevated PM BP. Labs stable. Umbilical artery flow remains absent on doppler. Ob glucose screen 169. Will order 3hour GTT this week.   HD15-16: Growth scan 420 g, <1%ile. Continue NST BID. Rescue course of BMZ completed.  HD17: BP to 150 overnight, Procardia given early.  HD18: BP in mild range intermittently overnight.  No acute issues.     Obstetric HPI:  Patient is doing well this mornng and without questions or complaints. She denies symptoms of pre-eclampsia -denies vision changes, SOB, RUQ pain.  Denies ctx, VB, LOF. Reports good FM.     Objective:     Vital Signs (Most Recent):  Temp: 97.5 °F (36.4 °C) (17)  Pulse: 81 (17)  Resp: 16 (17)  BP: 131/75 (17)  SpO2: 100 % (17) Vital Signs (24h Range):  Temp:  [97.3 °F (36.3 °C)-98.5 °F (36.9 °C)] 97.5 °F (36.4 °C)  Pulse:  [68-90] 81  Resp:  [16-20] 16  SpO2:  [75 %-100 %] 100 %  BP: (126-151)/(63-90) 131/75     Weight: 93.4 kg (205 lb 14.6 oz)  Body mass index is 36.48 kg/m².    Intake/Output Summary (Last 24 hours) at 17 0628  Last data filed at 17 1900   Gross per 24 hour   Intake             1360 ml   Output              925 ml   Net              435 ml     Significant Labs:  Recent Lab Results     None        FHT: reassuring, 150bpm, mod BTB variability, - accels, small variable x2  TOCO: no contractions      Physical Exam:   Constitutional: She is oriented to person, place, and time. She appears well-developed and well-nourished.    HENT:   Head: Normocephalic and atraumatic.     Neck: Normal range of motion.    Cardiovascular: Regular rhythm and normal heart sounds.     Pulmonary/Chest: Effort normal and breath sounds normal. No respiratory distress. She has no wheezes.        Abdominal: Soft. Bowel sounds are normal. She exhibits no distension. There is no tenderness.   Gravid 27w             Musculoskeletal: Normal range of motion and moves all extremeties.   UCHE/SCDs in place       Neurological: She is alert and oriented to person, place, and time. She has normal reflexes.    Skin: Skin is warm and dry. She is not diaphoretic.    Psychiatric: She has a normal mood and affect.       Assessment/Plan:     22 y.o. female  at 27w4d for:    * Pre-eclampsia, severe, antepartum    - Pre-E Labs stable ()- P/C: 5.44, AST: 25>16>11>12, Plt:322>310>348>339, Cr:0.6>0.6  - s/p magnesium  - s/p BMZ series  - BP controlled on Procardia XL 60AM/30PM  - BP: (126-151)/(63-90) 131/75  - Repeat  labs on 9/12          Tachycardia    - EKG: sinus tach otherwise normal  - asymptomatic  - Transient and resolves spontaneously          26 weeks gestation of pregnancy    - PNV  - NST BID  - Glucose screen 169 - will monitor fasting glucose, and 2hpp        History of hepatitis C    - Hep C viral load negative  - hepatic function panel normal        Hyperthyroidism    - no meds  - TSH low, FT4 normal  - thyroid antibodies negative        Hx of chlamydia infection    - GC/CT 8/22 was negative        Intrauterine growth restriction affecting antepartum care of mother in second trimester    - Fetal echo negative for VSD  - Work Up: CMV, parvo, toxoplasmosis -->negative  - Urine tox neg  - NST BID. Patient understands risks of FHR monitoring including early delivery  - Rescue course of BMZ completed (9/5-9/6)  - Growth scan (9/5),  g (<1%) with MVP 3.2 cm. Fluid is overall decreased  - Dopplers on 9/8 with persistent absent end diastolic flow.  No reversal noted        Asthma    - well controlled, no meds  - continue to monitor              Olive Duvall MD  Obstetrics  [facility]

## 2017-09-09 NOTE — ASSESSMENT & PLAN NOTE
- Fetal echo negative for VSD  - Work Up: CMV, parvo, toxoplasmosis -->negative  - Urine tox neg  - NST BID. Patient understands risks of FHR monitoring including early delivery  - Rescue course of BMZ completed (9/5-9/6)  - Growth scan (9/5),  g (<1%) with MVP 3.2 cm. Fluid is overall decreased  - Dopplers on 9/8 with persistent absent end diastolic flow.  No reversal noted

## 2017-09-09 NOTE — TRANSFER OF CARE
"Anesthesia Transfer of Care Note    Patient: Myrtle Valdez    Procedure(s) Performed: Procedure(s) (LRB):  DELIVERY- SECTION (N/A)    Patient location: Labor and Delivery    Anesthesia Type: spinal    Transport from OR: Transported from OR on room air with adequate spontaneous ventilation    Post pain: adequate analgesia    Post assessment: no apparent anesthetic complications and tolerated procedure well    Post vital signs: stable    Level of consciousness: awake, alert and oriented    Nausea/Vomiting: no nausea/vomiting    Complications: none    Transfer of care protocol was followed      Last vitals:   Visit Vitals  /71   Pulse 102   Temp 36.9 °C (98.4 °F) (Temporal)   Resp 16   Ht 5' 3" (1.6 m)   Wt 93 kg (205 lb)   LMP 2017   SpO2 100%   Breastfeeding? No   BMI 36.31 kg/m²     "

## 2017-09-10 PROCEDURE — 72100002 HC LABOR CARE, 1ST 8 HOURS

## 2017-09-10 PROCEDURE — 63600175 PHARM REV CODE 636 W HCPCS: Performed by: ANESTHESIOLOGY

## 2017-09-10 PROCEDURE — 25000003 PHARM REV CODE 250: Performed by: STUDENT IN AN ORGANIZED HEALTH CARE EDUCATION/TRAINING PROGRAM

## 2017-09-10 PROCEDURE — 25000003 PHARM REV CODE 250: Performed by: OBSTETRICS & GYNECOLOGY

## 2017-09-10 PROCEDURE — 11000001 HC ACUTE MED/SURG PRIVATE ROOM

## 2017-09-10 PROCEDURE — 51702 INSERT TEMP BLADDER CATH: CPT

## 2017-09-10 PROCEDURE — 99232 SBSQ HOSP IP/OBS MODERATE 35: CPT | Mod: 25,,, | Performed by: OBSTETRICS & GYNECOLOGY

## 2017-09-10 PROCEDURE — 25000003 PHARM REV CODE 250: Performed by: ANESTHESIOLOGY

## 2017-09-10 PROCEDURE — 63600175 PHARM REV CODE 636 W HCPCS: Performed by: STUDENT IN AN ORGANIZED HEALTH CARE EDUCATION/TRAINING PROGRAM

## 2017-09-10 PROCEDURE — 36000684 HC CESAREAN SECTION, UNSCHEDULED

## 2017-09-10 RX ORDER — OXYCODONE AND ACETAMINOPHEN 5; 325 MG/1; MG/1
1 TABLET ORAL EVERY 4 HOURS PRN
Status: DISCONTINUED | OUTPATIENT
Start: 2017-09-10 | End: 2017-09-13 | Stop reason: HOSPADM

## 2017-09-10 RX ORDER — DOCUSATE SODIUM 100 MG/1
200 CAPSULE, LIQUID FILLED ORAL 2 TIMES DAILY
Status: DISCONTINUED | OUTPATIENT
Start: 2017-09-10 | End: 2017-09-13 | Stop reason: HOSPADM

## 2017-09-10 RX ORDER — IBUPROFEN 600 MG/1
600 TABLET ORAL EVERY 6 HOURS
Status: DISCONTINUED | OUTPATIENT
Start: 2017-09-10 | End: 2017-09-13 | Stop reason: HOSPADM

## 2017-09-10 RX ORDER — METOCLOPRAMIDE HYDROCHLORIDE 5 MG/ML
5 INJECTION INTRAMUSCULAR; INTRAVENOUS EVERY 6 HOURS PRN
Status: DISCONTINUED | OUTPATIENT
Start: 2017-09-10 | End: 2017-09-13 | Stop reason: HOSPADM

## 2017-09-10 RX ORDER — HYDROMORPHONE HYDROCHLORIDE 1 MG/ML
1 INJECTION, SOLUTION INTRAMUSCULAR; INTRAVENOUS; SUBCUTANEOUS EVERY 4 HOURS PRN
Status: DISCONTINUED | OUTPATIENT
Start: 2017-09-10 | End: 2017-09-13 | Stop reason: HOSPADM

## 2017-09-10 RX ORDER — AMOXICILLIN 250 MG
1 CAPSULE ORAL NIGHTLY PRN
Status: DISCONTINUED | OUTPATIENT
Start: 2017-09-10 | End: 2017-09-13 | Stop reason: HOSPADM

## 2017-09-10 RX ORDER — OXYCODONE AND ACETAMINOPHEN 10; 325 MG/1; MG/1
1 TABLET ORAL EVERY 4 HOURS PRN
Status: DISCONTINUED | OUTPATIENT
Start: 2017-09-10 | End: 2017-09-13 | Stop reason: HOSPADM

## 2017-09-10 RX ORDER — DIPHENHYDRAMINE HCL 25 MG
25 CAPSULE ORAL EVERY 4 HOURS PRN
Status: DISCONTINUED | OUTPATIENT
Start: 2017-09-10 | End: 2017-09-13 | Stop reason: HOSPADM

## 2017-09-10 RX ORDER — HYDROCORTISONE 25 MG/G
CREAM TOPICAL 3 TIMES DAILY PRN
Status: DISCONTINUED | OUTPATIENT
Start: 2017-09-10 | End: 2017-09-13 | Stop reason: HOSPADM

## 2017-09-10 RX ORDER — BISACODYL 10 MG
10 SUPPOSITORY, RECTAL RECTAL ONCE AS NEEDED
Status: ACTIVE | OUTPATIENT
Start: 2017-09-10 | End: 2017-09-10

## 2017-09-10 RX ORDER — SIMETHICONE 80 MG
1 TABLET,CHEWABLE ORAL EVERY 6 HOURS PRN
Status: DISCONTINUED | OUTPATIENT
Start: 2017-09-10 | End: 2017-09-13 | Stop reason: HOSPADM

## 2017-09-10 RX ORDER — ONDANSETRON 8 MG/1
8 TABLET, ORALLY DISINTEGRATING ORAL EVERY 8 HOURS PRN
Status: DISCONTINUED | OUTPATIENT
Start: 2017-09-10 | End: 2017-09-13 | Stop reason: HOSPADM

## 2017-09-10 RX ORDER — ADHESIVE BANDAGE
30 BANDAGE TOPICAL 2 TIMES DAILY PRN
Status: DISCONTINUED | OUTPATIENT
Start: 2017-09-10 | End: 2017-09-13 | Stop reason: HOSPADM

## 2017-09-10 RX ADMIN — ACETAMINOPHEN 650 MG: 325 TABLET ORAL at 05:09

## 2017-09-10 RX ADMIN — IBUPROFEN 600 MG: 600 TABLET, FILM COATED ORAL at 05:09

## 2017-09-10 RX ADMIN — KETOROLAC TROMETHAMINE 30 MG: 30 INJECTION, SOLUTION INTRAMUSCULAR at 11:09

## 2017-09-10 RX ADMIN — OXYCODONE HYDROCHLORIDE 10 MG: 5 TABLET ORAL at 07:09

## 2017-09-10 RX ADMIN — DOCUSATE SODIUM 200 MG: 100 CAPSULE, LIQUID FILLED ORAL at 08:09

## 2017-09-10 RX ADMIN — OXYCODONE HYDROCHLORIDE AND ACETAMINOPHEN 1 TABLET: 10; 325 TABLET ORAL at 05:09

## 2017-09-10 RX ADMIN — ACETAMINOPHEN 650 MG: 325 TABLET ORAL at 11:09

## 2017-09-10 RX ADMIN — PRENATAL VIT W/ FE FUMARATE-FA TAB 27-0.8 MG 1 EACH: 27-0.8 TAB at 07:09

## 2017-09-10 RX ADMIN — KETOROLAC TROMETHAMINE 30 MG: 30 INJECTION, SOLUTION INTRAMUSCULAR at 05:09

## 2017-09-10 RX ADMIN — MAGNESIUM SULFATE IN WATER 2 G/HR: 40 INJECTION, SOLUTION INTRAVENOUS at 12:09

## 2017-09-10 RX ADMIN — NIFEDIPINE 60 MG: 30 TABLET, FILM COATED, EXTENDED RELEASE ORAL at 09:09

## 2017-09-10 NOTE — SUBJECTIVE & OBJECTIVE
Myrtle Valdez is a 22 y.o. female POD #1 status post primary classical C/S at 27w5d in a pregnancy complicated by PreE w/ SF (BP), FGR, REDF, hyperechoic fetal bowel, asthma, and h/o chlamydia. Patient is doing well this morning. She denies nausea, vomiting.  Patient reports mild abdominal pain that is adequately relieved by oral pain medications. Lochia is mild. Patient is voiding with mathews in place and is yet to ambulate. She has passed flatus, and has not had BM.  Denies HA/vision changes, CP/SOB, RUQ pain, LE edema.  Patient is currently pumping for infant in the NICU. Desires Depo Provera for contraception.      Temp:  [95.9 °F (35.5 °C)-98.8 °F (37.1 °C)] 97.2 °F (36.2 °C)  Pulse:  [] 95  Resp:  [16-18] 16  SpO2:  [90 %-100 %] 99 %  BP: (112-147)/(65-95) 117/65    Physical Exam:   Constitutional: She appears well-developed and well-nourished. No distress.       Cardiovascular: Normal rate and regular rhythm.     Pulmonary/Chest: Effort normal and breath sounds normal.        Abdominal: Soft. Bowel sounds are normal. She exhibits abdominal incision (bandage in place with minimal shadowing). She exhibits no distension. There is tenderness (appropriate postoperative).             Musculoskeletal: She exhibits no edema.       Neurological: She is alert. She has normal reflexes.    Skin: Skin is warm and dry.    Psychiatric: She has a normal mood and affect. Her behavior is normal. Judgment and thought content normal.       I/O    Intake/Output Summary (Last 24 hours) at 09/10/17 0754  Last data filed at 09/10/17 0600   Gross per 24 hour   Intake             2403 ml   Output             4960 ml   Net            -2557 ml

## 2017-09-10 NOTE — PLAN OF CARE
Problem: Patient Care Overview  Goal: Plan of Care Review  Outcome: Ongoing (interventions implemented as appropriate)  No acute events today.  Magnesium drip, IVF, and mathews catheter all discontinued at 1515.  Pt has been initiating breast pump use more on her own.  Pt was able to visit NICU for the first time this afternoon.  Incisional pain managed with scheduled and PRN meds.  Will continue to monitor.

## 2017-09-10 NOTE — PROGRESS NOTES
"MAG NOTE     Myrtle Valdez is a 22 y.o. female    Patient denies headaches, denies blurry vision and denies right upper quadrant pain. denies CP, denies SOB. Patient reports no nausea/no vomiting.     Objective:       /70   Pulse 94   Temp 96.6 °F (35.9 °C) (Temporal)   Resp 16   Ht 5' 3" (1.6 m)   Wt 93 kg (205 lb)   LMP 02/23/2017   SpO2 99%   Breastfeeding? Unknown   BMI 36.31 kg/m²   Vitals:    09/10/17 1119 09/10/17 1133 09/10/17 1148 09/10/17 1149   BP: 125/73 116/67 118/70 118/70   BP Location:       Patient Position:       Pulse: 85 90 93 94   Resp:   16 16   Temp:    96.6 °F (35.9 °C)   TempSrc:    Temporal   SpO2: 100%  99% 99%   Weight:       Height:             I/O last 3 completed shifts:  In: 2403 [I.V.:2403]  Out: 4960 [Urine:4610; Blood:350]    I/O this shift:  In: 600 [I.V.:600]  Out: 1125 [Urine:1125]      Date 09/10/17 0700 - 09/11/17 0659   Shift 4400-3595 7096-6008 2960-2855 24 Hour Total   I  N  T  A  K  E   I.V.  (mL/kg) 600  (6.5)   600  (6.5)    Shift Total  (mL/kg) 600  (6.5)   600  (6.5)   O  U  T  P  U  T   Urine  (mL/kg/hr) 1125   1125    Shift Total  (mL/kg) 1125  (12.1)   1125  (12.1)   Weight (kg) 93 93 93 93         General:   alert, appears stated age and cooperative   Lungs:   clear to auscultation bilaterally   Heart:   regular rate and rhythm, S1, S2 normal, no murmur, click, rub or gallop   Abdomen:  soft, non-tender; bowel sounds normal; no masses,  no organomegaly   Extremities: peripheral pulses normal, no pedal edema, no clubbing or cyanosis   Reflexes - 2+  bilaterally         Lab Review  Recent Results (from the past 48 hour(s))   CBC auto differential    Collection Time: 09/09/17  2:07 PM   Result Value Ref Range    WBC 23.21 (H) 3.90 - 12.70 K/uL    RBC 4.35 4.00 - 5.40 M/uL    Hemoglobin 12.2 12.0 - 16.0 g/dL    Hematocrit 36.0 (L) 37.0 - 48.5 %    MCV 83 82 - 98 fL    MCH 28.0 27.0 - 31.0 pg    MCHC 33.9 32.0 - 36.0 g/dL    RDW 12.8 11.5 - 14.5 %    " Platelets 361 (H) 150 - 350 K/uL    MPV 10.2 9.2 - 12.9 fL    Gran # 18.5 (H) 1.8 - 7.7 K/uL    Lymph # 2.7 1.0 - 4.8 K/uL    Mono # 1.8 (H) 0.3 - 1.0 K/uL    Eos # 0.1 0.0 - 0.5 K/uL    Baso # 0.02 0.00 - 0.20 K/uL    Gran% 79.4 (H) 38.0 - 73.0 %    Lymph% 11.8 (L) 18.0 - 48.0 %    Mono% 7.6 4.0 - 15.0 %    Eosinophil% 0.6 0.0 - 8.0 %    Basophil% 0.1 0.0 - 1.9 %    Differential Method Automated    Type & Screen    Collection Time: 17  2:08 PM   Result Value Ref Range    Group & Rh B POS     Indirect Joao NEG    ISTAT PROCEDURE    Collection Time: 17  3:57 PM   Result Value Ref Range    POC PH 7.188 (L) 7.35 - 7.45    POC PCO2 61.3 (H) 35 - 45 mmHg    POC PO2 16 (L) 80 - 100 mmHg    POC HCO3 23.3 (L) 24 - 28 mmol/L    POC BE -5 -2 to 2 mmol/L    POC SATURATED O2 14 (L) 95 - 100 %    Sample CRD V     Site Other     Allens Test N/A           Assessment:     22 y.o.  female , on magnesium sulfate    Active Hospital Problems    Diagnosis  POA    *Pre-eclampsia, severe, antepartum [O14.10]  Yes    History of classical  section [Z98.891]  Not Applicable    Status post  section [Z98.891]  Not Applicable    Preeclampsia, severe [O14.10]  Yes    Tachycardia [R00.0]  Yes    Intrauterine growth restriction affecting antepartum care of mother in second trimester [O36.4468]  Yes    Hx of chlamydia infection [Z86.19]  Unknown    Hyperthyroidism [E05.90]  Yes    History of hepatitis C [Z86.19]  Yes    26 weeks gestation of pregnancy [Z3A.26]  Not Applicable    Asthma [J45.909]  Yes      Resolved Hospital Problems    Diagnosis Date Resolved POA    Mild intermittent asthma without complication [J45.20] 2017 Yes    Preeclampsia, severe [O14.10] 2017 Yes        Plan:   1. Continue magnesium sulfate, no signs of toxicity at this time  2. Monitor for s/s of eclampsia  3. Close maternal monitoring including UOP and BP,   4. Recheck in 2-4 hours or PRN

## 2017-09-10 NOTE — ASSESSMENT & PLAN NOTE
- MgSO4 x 24 hours postpartum  - BP controlled on Procardia XL 60AM/30PM  - BP: (112-147)/(65-95) 117/65  - UOP 4610cc/23h

## 2017-09-10 NOTE — ASSESSMENT & PLAN NOTE
- Fetal echo negative for VSD  - Work Up: CMV, parvo, toxoplasmosis -->negative  - Urine tox neg  - Rescue course of BMZ completed (9/5-9/6)  - REDF  - s/p 1CCS

## 2017-09-10 NOTE — PLAN OF CARE
Problem: Patient Care Overview  Goal: Plan of Care Review  Outcome: Ongoing (interventions implemented as appropriate)  PP. Magnesium drip infusing. Incision, vag bleeding and UO SUSANA. No complaints of visual changes, epigastric pain or KAPADIA. BP SUSANA Johnson. No falls or injury this shift.

## 2017-09-10 NOTE — ANESTHESIA POSTPROCEDURE EVALUATION
"Anesthesia Post Evaluation    Patient: Myrtle Valdez    Procedure(s) Performed: Procedure(s) (LRB):  DELIVERY- SECTION (N/A)    Final Anesthesia Type: spinal  Patient location during evaluation: floor  Patient participation: Yes- Able to Participate  Level of consciousness: awake and alert and oriented  Post-procedure vital signs: reviewed and stable  Pain management: adequate  Airway patency: patent  PONV status at discharge: No PONV  Anesthetic complications: no      Cardiovascular status: blood pressure returned to baseline  Respiratory status: unassisted  Hydration status: euvolemic          Visit Vitals  /76   Pulse 98   Temp 35.9 °C (96.6 °F) (Temporal)   Resp 16   Ht 5' 3" (1.6 m)   Wt 93 kg (205 lb)   LMP 2017   SpO2 100%   Breastfeeding? Unknown   BMI 36.31 kg/m²       Pain/Deo Score: Pain Rating Prior to Med Admin: 0 (9/10/2017  1:00 PM)  Pain Rating Post Med Admin: 0 (9/10/2017  1:00 PM)      "

## 2017-09-10 NOTE — PROGRESS NOTES
"MAG NOTE     Myrtle Valdez is a 22 y.o. female    Patient denies headaches, denies blurry vision and denies right upper quadrant pain. denies CP, denies SOB. Patient reports no nausea/no vomiting.     Objective:       /67   Pulse 96   Temp 98.4 °F (36.9 °C)   Resp 16   Ht 5' 3" (1.6 m)   Wt 93 kg (205 lb)   LMP 02/23/2017   SpO2 100%   Breastfeeding? No   BMI 36.31 kg/m²   Vitals:    09/09/17 2353 09/09/17 2358 09/10/17 0100 09/10/17 0200   BP:  114/70 123/69 112/67   BP Location:       Patient Position:       Pulse: 98 97 92 96   Resp:  18 16 16   Temp:       TempSrc:       SpO2: 100% 100% 100% 100%   Weight:       Height:             I/O last 3 completed shifts:  In: 1860 [P.O.:1360; I.V.:500]  Out: 1835 [Urine:1485; Blood:350]    I/O this shift:  In: 1203 [I.V.:1203]  Out: 2475 [Urine:2475]           General:   alert, appears stated age and cooperative   Lungs:   clear to auscultation bilaterally   Heart:   regular rate and rhythm, S1, S2 normal, no murmur, click, rub or gallop   Abdomen:  soft, non-tender; bowel sounds normal; no masses,  no organomegaly       Extremities: peripheral pulses normal, no pedal edema, no clubbing or cyanosis   Reflexes - 2+  bilaterally         Lab Review  Recent Results (from the past 48 hour(s))   CBC auto differential    Collection Time: 09/09/17  2:07 PM   Result Value Ref Range    WBC 23.21 (H) 3.90 - 12.70 K/uL    RBC 4.35 4.00 - 5.40 M/uL    Hemoglobin 12.2 12.0 - 16.0 g/dL    Hematocrit 36.0 (L) 37.0 - 48.5 %    MCV 83 82 - 98 fL    MCH 28.0 27.0 - 31.0 pg    MCHC 33.9 32.0 - 36.0 g/dL    RDW 12.8 11.5 - 14.5 %    Platelets 361 (H) 150 - 350 K/uL    MPV 10.2 9.2 - 12.9 fL    Gran # 18.5 (H) 1.8 - 7.7 K/uL    Lymph # 2.7 1.0 - 4.8 K/uL    Mono # 1.8 (H) 0.3 - 1.0 K/uL    Eos # 0.1 0.0 - 0.5 K/uL    Baso # 0.02 0.00 - 0.20 K/uL    Gran% 79.4 (H) 38.0 - 73.0 %    Lymph% 11.8 (L) 18.0 - 48.0 %    Mono% 7.6 4.0 - 15.0 %    Eosinophil% 0.6 0.0 - 8.0 %    Basophil% " 0.1 0.0 - 1.9 %    Differential Method Automated    Type & Screen    Collection Time: 17  2:08 PM   Result Value Ref Range    Group & Rh B POS     Indirect Joao NEG    ISTAT PROCEDURE    Collection Time: 17  3:57 PM   Result Value Ref Range    POC PH 7.188 (L) 7.35 - 7.45    POC PCO2 61.3 (H) 35 - 45 mmHg    POC PO2 16 (L) 80 - 100 mmHg    POC HCO3 23.3 (L) 24 - 28 mmol/L    POC BE -5 -2 to 2 mmol/L    POC SATURATED O2 14 (L) 95 - 100 %    Sample CRD V     Site Other     Allens Test N/A           Assessment:     22 y.o.  female , on magnesium sulfate    Active Hospital Problems    Diagnosis  POA    *Pre-eclampsia, severe, antepartum [O14.10]  Yes    History of classical  section [Z98.891]  Not Applicable    Status post  section [Z98.891]  Not Applicable    Preeclampsia, severe [O14.10]  Yes    Tachycardia [R00.0]  Yes    Intrauterine growth restriction affecting antepartum care of mother in second trimester [O36.5920]  Yes    Hx of chlamydia infection [Z86.19]  Unknown    Hyperthyroidism [E05.90]  Yes    History of hepatitis C [Z86.19]  Yes    26 weeks gestation of pregnancy [Z3A.26]  Not Applicable    Asthma [J45.909]  Yes      Resolved Hospital Problems    Diagnosis Date Resolved POA    Mild intermittent asthma without complication [J45.20] 2017 Yes    Preeclampsia, severe [O14.10] 2017 Yes        Plan:   1. Continue magnesium sulfate, no signs of toxicity at this time  2. Monitor for s/s of eclampsia  3. Close maternal monitoring including UOP and BP,   4. Recheck in 2-4 hours or PRN

## 2017-09-10 NOTE — PROGRESS NOTES
[facility]  Obstetrics  Antepartum Progress Note    Patient Name: Myrtle Valdez  MRN: [id number]  Admission Date: 2017  Hospital Length of Stay: 19 days  Attending Physician: Martha Mcdermott MD  Primary Care Provider: Primary Doctor No    Subjective:     Principal Problem:Pre-eclampsia, severe, antepartum    HPI:  Myrtle Valdez is a 22 y.o. [id number] at 25w0d presents as a transport from [facility] secondary to PreE w/SF and FGR with AEDF.    This IUP is complicated by newly diagnosed PreE w/SF (severe range BP), fetal growth restriction (1%, AEDF, fetal hyperechoic bowel and possible VSD), asthma (well controlled, no meds), hx hyperthyroidism (no meds), questionable hx of HepC, hx of chlamydia.    Currently patient has no complaints. Patient denies headache, scotoma, RUQ pain, N/V, CP, SOB. Patient denies contractions, vaginal bleeding or discharge, denies LOF.   Fetal Movement: normal.     Hospital Course:  2017 - admitted to antepartum at 25w0d for newly diagnosed PreE w/SF in a pregnancy complicated by FGR (AEDF). BMZ series and Mag started at OSH. BP remained elevated, started on Procardia 30XL.  2017 - Doing well from PreE standpoint, asymptomatic, labs stable, BP now mild range.  2017 - - Continues to be stable from PrE standpoint. Platelets stable, AST stable. Continue FHR dopplers q shift.  HD7-14: BP regimen changed to Procardia XL 60AM/30PM due to elevated PM BP. Labs stable. Umbilical artery flow remains absent on doppler. Ob glucose screen 169. Will order 3hour GTT this week.   HD15-16: Growth scan 420 g, <1%ile. Continue NST BID. Rescue course of BMZ completed.  HD17: BP to 150 overnight, Procardia given early.  HD18: BP in mild range intermittently overnight.  No acute issues.   HD19: NST with new onset variable decelerations. REDF on U/S. CCS 2/2 REDF/NRFHT - uncomplicated.      Myrtle Valdez is a 22 y.o. female POD #1 status post primary  classical C/S at 27w5d in a pregnancy complicated by PreE w/ SF (BP), FGR, REDF, hyperechoic fetal bowel, asthma, and h/o chlamydia. Patient is doing well this morning. She denies nausea, vomiting.  Patient reports mild abdominal pain that is adequately relieved by oral pain medications. Lochia is mild. Patient is voiding with mathews in place and is yet to ambulate. She has passed flatus, and has not had BM.  Denies HA/vision changes, CP/SOB, RUQ pain, LE edema.  Patient is currently pumping for infant in the NICU. Desires Depo Provera for contraception.      Temp:  [95.9 °F (35.5 °C)-98.8 °F (37.1 °C)] 97.2 °F (36.2 °C)  Pulse:  [] 95  Resp:  [16-18] 16  SpO2:  [90 %-100 %] 99 %  BP: (112-147)/(65-95) 117/65    Physical Exam:   Constitutional: She appears well-developed and well-nourished. No distress.       Cardiovascular: Normal rate and regular rhythm.     Pulmonary/Chest: Effort normal and breath sounds normal.        Abdominal: Soft. Bowel sounds are normal. She exhibits abdominal incision (bandage in place with minimal shadowing). She exhibits no distension. There is tenderness (appropriate postoperative).             Musculoskeletal: She exhibits no edema.       Neurological: She is alert. She has normal reflexes.    Skin: Skin is warm and dry.    Psychiatric: She has a normal mood and affect. Her behavior is normal. Judgment and thought content normal.       I/O    Intake/Output Summary (Last 24 hours) at 09/10/17 0754  Last data filed at 09/10/17 0600   Gross per 24 hour   Intake             2403 ml   Output             4960 ml   Net            -2557 ml     Assessment/Plan:     22 y.o. female  at 27w5d for:    * Pre-eclampsia, severe, antepartum    - MgSO4 x 24 hours postpartum  - BP controlled on Procardia XL 60AM/30PM  - BP: (112-147)/(65-95) 117/65  - UOP 4610cc/23h        Status post  section    - Patient doing well. Continue routine management and advances.  - Pain well  controlled.  - Heme: H/h 12.3/36-->p  - Encourage ambulation  - Circumcision: infant in NICU  - Contraception: Depo Provera  - Lactation: pumping for infant in NICU          Tachycardia    - EKG: sinus tach otherwise normal  - asymptomatic  - Transient and resolves spontaneously          26 weeks gestation of pregnancy    - PNV  - NST BID  - Glucose screen 169 - will monitor fasting glucose, and 2hpp        History of hepatitis C    - Hep C viral load negative  - hepatic function panel normal        Hyperthyroidism    - no meds  - TSH low, FT4 normal  - thyroid antibodies negative        Hx of chlamydia infection    - GC/CT 8/22 was negative        Intrauterine growth restriction affecting antepartum care of mother in second trimester    - Fetal echo negative for VSD  - Work Up: CMV, parvo, toxoplasmosis -->negative  - Urine tox neg  - Rescue course of BMZ completed (9/5-9/6)  - REDF  - s/p 1CCS        Asthma    - well controlled, no meds  - continue to monitor              Aspen Olsen MD  Obstetrics  [facility]

## 2017-09-10 NOTE — LACTATION NOTE
This note was copied from a baby's chart.     09/10/17 1600   Maternal Information   Infant Reason for Referral other (see comments)  (NICU admission)   Maternal Medical Surgical History   History of Preexisting Medical Disorder yes   Medical Disorder asthma;hyperthyroidism;other (see comments)  (ADHD; Hepatitis C; severe Pre-E)   Surgical History yes   Surgical Procedure other (see comments)  (hip surgery; Lt tympanoplasty)   Infant Information   Infant's Name Praveen   Infant Assessment   Medical Condition other (see comments)  (Prematurity; IUGR; RDS)   Maternal Infant Feeding   Time Spent (min) 0-15 min   Breastfeeding Education importance of skin-to-skin contact;diet;prenatal vitamins continued   Breastfeeding History   Breastfeeding History no   Equipment Type/Education   Pumping Frequency (times) (encouraged mother to pump 8 or mor times in 24-hours)    Breastfeeding   Breast Pumping Interventions frequent pumping encouraged;other (see comments)  (bedside pumping encouraged - provided supplies)   Lactation Interventions   Attachment Promotion skin-to-skin contact encouraged  (as soon as Praveen able (UVC in place at present))   Maternal Breastfeeding Support encouragement offered;maternal hydration promoted;maternal nutrition promoted     Met mother and maternal grandmother at Praveen's bedside this afternoon; introduced self to family; praised mother for initiating pumping; mother voiced discouragement that she has not gotten any milk yet; acknowledged mother's feelings then explained that it is normal to get drops only the first couple of days; mother reports that her breasts felt heavier during pregnancy; encouraged mother to pump at least 8 or more times in 24-hour period; offered ongoing lactation support/assistance to mother as needed

## 2017-09-10 NOTE — ASSESSMENT & PLAN NOTE
- Patient doing well. Continue routine management and advances.  - Pain well controlled.  - Heme: H/h 12.3/36-->p  - Encourage ambulation  - Circumcision: infant in NICU  - Contraception: Depo Provera  - Lactation: pumping for infant in NICU

## 2017-09-10 NOTE — LACTATION NOTE
Lactation education provided for pumping, packet reviewed. Discussed frequency, duration, cleaning and sterilizing of kit and labeling/transportation of ebm. Pt and mother acknowledged understanding.

## 2017-09-11 PROBLEM — O36.5920 INTRAUTERINE GROWTH RESTRICTION AFFECTING ANTEPARTUM CARE OF MOTHER IN SECOND TRIMESTER: Status: RESOLVED | Noted: 2017-08-22 | Resolved: 2017-09-11

## 2017-09-11 PROBLEM — Z3A.26 26 WEEKS GESTATION OF PREGNANCY: Status: RESOLVED | Noted: 2017-08-22 | Resolved: 2017-09-11

## 2017-09-11 PROBLEM — O14.10 PREECLAMPSIA, SEVERE: Status: RESOLVED | Noted: 2017-09-09 | Resolved: 2017-09-11

## 2017-09-11 LAB
BASOPHILS # BLD AUTO: 0.02 K/UL
BASOPHILS NFR BLD: 0.1 %
DIFFERENTIAL METHOD: ABNORMAL
EOSINOPHIL # BLD AUTO: 0.4 K/UL
EOSINOPHIL NFR BLD: 2.4 %
ERYTHROCYTE [DISTWIDTH] IN BLOOD BY AUTOMATED COUNT: 13.2 %
HBV SURFACE AG SERPL QL IA: NEGATIVE
HCT VFR BLD AUTO: 32.1 %
HGB BLD-MCNC: 10.8 G/DL
LYMPHOCYTES # BLD AUTO: 2.1 K/UL
LYMPHOCYTES NFR BLD: 11.5 %
MCH RBC QN AUTO: 28.4 PG
MCHC RBC AUTO-ENTMCNC: 33.6 G/DL
MCV RBC AUTO: 85 FL
MONOCYTES # BLD AUTO: 1.3 K/UL
MONOCYTES NFR BLD: 7.1 %
NEUTROPHILS # BLD AUTO: 14.2 K/UL
NEUTROPHILS NFR BLD: 78.3 %
PLATELET # BLD AUTO: 323 K/UL
PMV BLD AUTO: 9.6 FL
RBC # BLD AUTO: 3.8 M/UL
RPR SER QL: NORMAL
RUBV IGG SER-ACNC: 26.7 IU/ML
RUBV IGG SER-IMP: REACTIVE
WBC # BLD AUTO: 18.12 K/UL

## 2017-09-11 PROCEDURE — 36415 COLL VENOUS BLD VENIPUNCTURE: CPT

## 2017-09-11 PROCEDURE — 25000003 PHARM REV CODE 250: Performed by: OBSTETRICS & GYNECOLOGY

## 2017-09-11 PROCEDURE — 25000003 PHARM REV CODE 250: Performed by: STUDENT IN AN ORGANIZED HEALTH CARE EDUCATION/TRAINING PROGRAM

## 2017-09-11 PROCEDURE — 11000001 HC ACUTE MED/SURG PRIVATE ROOM

## 2017-09-11 PROCEDURE — 85025 COMPLETE CBC W/AUTO DIFF WBC: CPT

## 2017-09-11 PROCEDURE — 99233 SBSQ HOSP IP/OBS HIGH 50: CPT | Mod: 25,,, | Performed by: OBSTETRICS & GYNECOLOGY

## 2017-09-11 RX ORDER — SODIUM CHLORIDE, SODIUM LACTATE, POTASSIUM CHLORIDE, CALCIUM CHLORIDE 600; 310; 30; 20 MG/100ML; MG/100ML; MG/100ML; MG/100ML
INJECTION, SOLUTION INTRAVENOUS CONTINUOUS
Status: DISCONTINUED | OUTPATIENT
Start: 2017-09-11 | End: 2017-09-11

## 2017-09-11 RX ORDER — OXYTOCIN/RINGER'S LACTATE 20/1000 ML
41.65 PLASTIC BAG, INJECTION (ML) INTRAVENOUS CONTINUOUS
Status: DISCONTINUED | OUTPATIENT
Start: 2017-09-11 | End: 2017-09-11

## 2017-09-11 RX ADMIN — DOCUSATE SODIUM 200 MG: 100 CAPSULE, LIQUID FILLED ORAL at 12:09

## 2017-09-11 RX ADMIN — OXYCODONE HYDROCHLORIDE AND ACETAMINOPHEN 1 TABLET: 10; 325 TABLET ORAL at 07:09

## 2017-09-11 RX ADMIN — IBUPROFEN 600 MG: 600 TABLET, FILM COATED ORAL at 06:09

## 2017-09-11 RX ADMIN — DOCUSATE SODIUM 200 MG: 100 CAPSULE, LIQUID FILLED ORAL at 09:09

## 2017-09-11 RX ADMIN — OXYCODONE HYDROCHLORIDE AND ACETAMINOPHEN 1 TABLET: 10; 325 TABLET ORAL at 05:09

## 2017-09-11 RX ADMIN — NIFEDIPINE 60 MG: 30 TABLET, FILM COATED, EXTENDED RELEASE ORAL at 09:09

## 2017-09-11 RX ADMIN — NIFEDIPINE 30 MG: 30 TABLET, FILM COATED, EXTENDED RELEASE ORAL at 12:09

## 2017-09-11 RX ADMIN — IBUPROFEN 600 MG: 600 TABLET, FILM COATED ORAL at 11:09

## 2017-09-11 RX ADMIN — IBUPROFEN 600 MG: 600 TABLET, FILM COATED ORAL at 12:09

## 2017-09-11 RX ADMIN — NIFEDIPINE 30 MG: 30 TABLET, FILM COATED, EXTENDED RELEASE ORAL at 09:09

## 2017-09-11 RX ADMIN — IBUPROFEN 600 MG: 600 TABLET, FILM COATED ORAL at 05:09

## 2017-09-11 NOTE — SUBJECTIVE & OBJECTIVE
Myrtle Valdez is a 22 y.o. female POD #2 status post primary classical C/S at 27w5d in a pregnancy complicated by PreE w/ SF (BP), FGR, REDF, hyperechoic fetal bowel, asthma, and h/o chlamydia. Patient is doing well this morning. She denies nausea, vomiting.  Patient reports mild abdominal pain that is adequately relieved by oral pain medications. Lochia is mild. She is voiding and ambulating without difficulty. She has passed flatus, and has not had BM.  Denies HA/vision changes, CP/SOB, RUQ pain, LE edema.  Patient is currently pumping for infant in the NICU. Desires Depo Provera for contraception.      Temp:  [95.9 °F (35.5 °C)-98.8 °F (37.1 °C)] 97.2 °F (36.2 °C)  Pulse:  [] 95  Resp:  [16-18] 16  SpO2:  [90 %-100 %] 99 %  BP: (112-147)/(65-95) 117/65    Physical Exam:   Constitutional: She appears well-developed and well-nourished. No distress.       Cardiovascular: Normal rate and regular rhythm.     Pulmonary/Chest: Effort normal and breath sounds normal.        Abdominal: Soft. Bowel sounds are normal. She exhibits abdominal incision (c/d/i). She exhibits no distension. There is tenderness (appropriate postoperative).             Musculoskeletal: She exhibits no edema.       Neurological: She is alert. She has normal reflexes.    Skin: Skin is warm and dry.    Psychiatric: She has a normal mood and affect. Her behavior is normal. Judgment and thought content normal.       I/O    Intake/Output Summary (Last 24 hours) at 09/10/17 0754  Last data filed at 09/10/17 0600   Gross per 24 hour   Intake             2403 ml   Output             4960 ml   Net            -2557 ml

## 2017-09-11 NOTE — ASSESSMENT & PLAN NOTE
- Patient doing well. Continue routine management and advances.  - Pain well controlled.  - Heme: H/h 12.3/36-->10/32  - Encourage ambulation  - Circumcision: infant in NICU  - Contraception: Depo Provera  - Lactation: pumping for infant in NICU

## 2017-09-11 NOTE — LACTATION NOTE
"   09/11/17 1730   Maternal Infant Feeding   Time Spent (min) 0-15 min   Equipment Type/Education   Pump Type Symphony   Breast Pump Type double electric, hospital grade   Pumping Frequency (times) (has pumped once in past 24 hours)   Lactation Referrals   Lactation Consult Knowledge deficit;Pump teaching   Lactation Interventions   Attachment Promotion counseling provided   Breastfeeding Assistance support offered   Maternal Breastfeeding Support encouragement offered;lactation counseling provided   Patient states she has pumped once this morning and that she "isn't getting anything". Discussed supply and demand principles and encouraged pumping 8 or more pumpings/24 hours. Reinforced handwashing, cleaning of pump kit, storage, labeling, and transport of breast milk. Briefly discussed hand expression and encouraged to ask staff for assistance as needed. Declined assistance at this time. Encouraged laso to call [facility] office in the morning for availability of electric breast pump for discharge.   "

## 2017-09-11 NOTE — PLAN OF CARE
Problem: Patient Care Overview  Goal: Plan of Care Review  Outcome: Ongoing (interventions implemented as appropriate)  Patient is following pumping education for baby in NICU

## 2017-09-11 NOTE — PROGRESS NOTES
[facility]  Obstetrics  Postpartum Progress Note    Patient Name: Myrtle Valdez  MRN: [id number]  Admission Date: 8/22/2017  Hospital Length of Stay: 20 days  Attending Physician: Martha Mcdermott MD  Primary Care Provider: Primary Doctor No    Subjective:     Principal Problem:Severe preeclampsia     Myrtle Valdez is a 22 y.o. female POD #2 status post primary classical C/S at 27w5d in a pregnancy complicated by PreE w/ SF (BP), FGR, REDF, hyperechoic fetal bowel, asthma, and h/o chlamydia. Patient is doing well this morning. She denies nausea, vomiting.  Patient reports mild abdominal pain that is adequately relieved by oral pain medications. Lochia is mild. She is voiding and ambulating without difficulty. She has passed flatus, and has not had BM.  Denies HA/vision changes, CP/SOB, RUQ pain, LE edema.  Patient is currently pumping for infant in the NICU. Desires Depo Provera for contraception.      Temp:  [95.9 °F (35.5 °C)-98.8 °F (37.1 °C)] 97.2 °F (36.2 °C)  Pulse:  [] 95  Resp:  [16-18] 16  SpO2:  [90 %-100 %] 99 %  BP: (112-147)/(65-95) 117/65    Physical Exam:   Constitutional: She appears well-developed and well-nourished. No distress.       Cardiovascular: Normal rate and regular rhythm.     Pulmonary/Chest: Effort normal and breath sounds normal.        Abdominal: Soft. Bowel sounds are normal. She exhibits abdominal incision (c/d/i). She exhibits no distension. There is tenderness (appropriate postoperative).             Musculoskeletal: She exhibits no edema.       Neurological: She is alert. She has normal reflexes.    Skin: Skin is warm and dry.    Psychiatric: She has a normal mood and affect. Her behavior is normal. Judgment and thought content normal.       I/O    Intake/Output Summary (Last 24 hours) at 09/10/17 0754  Last data filed at 09/10/17 0600   Gross per 24 hour   Intake             2403 ml   Output             4960 ml   Net            -2557 ml      Assessment/Plan:     22 y.o. female  for:    * Severe preeclampsia    - S/p MgSO4  - BP controlled on Procardia XL 60AM/30PM        Status post  section    - Patient doing well. Continue routine management and advances.  - Pain well controlled.  - Heme: H/h 12.3-->10/32  - Encourage ambulation  - Circumcision: infant in NICU  - Contraception: Depo Provera  - Lactation: pumping for infant in NICU          Tachycardia    - EKG: sinus tach otherwise normal  - asymptomatic  - Transient and resolves spontaneously          History of hepatitis C    - Hep C viral load negative  - hepatic function panel normal        Hyperthyroidism    - no meds  - TSH low, FT4 normal  - thyroid antibodies negative        Hx of chlamydia infection    - GC/CT  was negative        Asthma    - well controlled, no meds  - continue to monitor            Disposition: As patient meets milestones, will plan to discharge POD #4.    Ramiro Del Valle MD  Obstetrics  [facility]

## 2017-09-12 ENCOUNTER — TELEPHONE (OUTPATIENT)
Dept: MATERNAL FETAL MEDICINE | Facility: CLINIC | Age: 22
End: 2017-09-12

## 2017-09-12 PROBLEM — R00.0 TACHYCARDIA: Status: RESOLVED | Noted: 2017-08-23 | Resolved: 2017-09-12

## 2017-09-12 PROCEDURE — 11000001 HC ACUTE MED/SURG PRIVATE ROOM

## 2017-09-12 PROCEDURE — 25000003 PHARM REV CODE 250: Performed by: STUDENT IN AN ORGANIZED HEALTH CARE EDUCATION/TRAINING PROGRAM

## 2017-09-12 PROCEDURE — 99233 SBSQ HOSP IP/OBS HIGH 50: CPT | Mod: 25,,, | Performed by: OBSTETRICS & GYNECOLOGY

## 2017-09-12 RX ORDER — NIFEDIPINE 30 MG/1
30 TABLET, EXTENDED RELEASE ORAL DAILY
Status: DISCONTINUED | OUTPATIENT
Start: 2017-09-12 | End: 2017-09-13 | Stop reason: HOSPADM

## 2017-09-12 RX ADMIN — IBUPROFEN 600 MG: 600 TABLET, FILM COATED ORAL at 07:09

## 2017-09-12 RX ADMIN — DOCUSATE SODIUM 200 MG: 100 CAPSULE, LIQUID FILLED ORAL at 09:09

## 2017-09-12 RX ADMIN — OXYCODONE HYDROCHLORIDE AND ACETAMINOPHEN 1 TABLET: 5; 325 TABLET ORAL at 11:09

## 2017-09-12 RX ADMIN — NIFEDIPINE 30 MG: 30 TABLET, FILM COATED, EXTENDED RELEASE ORAL at 09:09

## 2017-09-12 RX ADMIN — IBUPROFEN 600 MG: 600 TABLET, FILM COATED ORAL at 11:09

## 2017-09-12 RX ADMIN — DOCUSATE SODIUM 200 MG: 100 CAPSULE, LIQUID FILLED ORAL at 07:09

## 2017-09-12 RX ADMIN — IBUPROFEN 600 MG: 600 TABLET, FILM COATED ORAL at 06:09

## 2017-09-12 NOTE — LACTATION NOTE
09/12/17 1215   Maternal Infant Assessment   Breast Shape Bilateral:;pendulous   Breast Density soft;Bilateral:   Nipple(s) Bilateral:;flat   Lactation Interventions   Breastfeeding Assistance support offered;milk expression/pumping   Maternal Breastfeeding Support lactation counseling provided   assisted pt with pumping and hand expression. reviewed pumping education. Questions answered.

## 2017-09-12 NOTE — PLAN OF CARE
SW was alerted that pt will need transportation home tomorrow. Pt informed that sw would be making reservation with Medicaid transportation. Sw contacted [facility] ([phone]). Spoke to Dwight. Reservation set for tomorrow pending pt d/c. Confirmation #[id number]. Where's my ride ([phone]) can be contacted tomorrow to check the status of pt reservation. Pt and MD were notified of reservation.       Will cont to f/u.      JERRY [facility] [place]  [email or web address]    (phone) [phone] or  [place] [phone]  (fax) [phone]

## 2017-09-12 NOTE — PROGRESS NOTES
[facility]  Obstetrics  Antepartum Progress Note    Patient Name: Myrtle Valdez  MRN: [id number]  Admission Date: 2017  Hospital Length of Stay: 21 days  Attending Physician: Martha Mcdermott MD  Primary Care Provider: Primary Doctor No    Subjective:     Principal Problem:Severe preeclampsia    HPI:  Myrtle Valdez is a 22 y.o. [id number] at 25w0d presents as a transport from [facility] secondary to PreE w/SF and FGR with AEDF.    This IUP is complicated by newly diagnosed PreE w/SF (severe range BP), fetal growth restriction (1%, AEDF, fetal hyperechoic bowel and possible VSD), asthma (well controlled, no meds), hx hyperthyroidism (no meds), questionable hx of HepC, hx of chlamydia.    Currently patient has no complaints. Patient denies headache, scotoma, RUQ pain, N/V, CP, SOB. Patient denies contractions, vaginal bleeding or discharge, denies LOF.   Fetal Movement: normal.     Hospital Course:  2017 - admitted to antepartum at 25w0d for newly diagnosed PreE w/SF in a pregnancy complicated by FGR (AEDF). BMZ series and Mag started at OSH. BP remained elevated, started on Procardia 30XL.  2017 - Doing well from PreE standpoint, asymptomatic, labs stable, BP now mild range.  2017 - - Continues to be stable from PrE standpoint. Platelets stable, AST stable. Continue FHR dopplers q shift.  HD7-14: BP regimen changed to Procardia XL 60AM/30PM due to elevated PM BP. Labs stable. Umbilical artery flow remains absent on doppler. Ob glucose screen 169. Will order 3hour GTT this week.   HD15-16: Growth scan 420 g, <1%ile. Continue NST BID. Rescue course of BMZ completed.  HD17: BP to 150 overnight, Procardia given early.  HD18: BP in mild range intermittently overnight.  No acute issues.   HD19: NST with new onset variable decelerations. REDF on U/S. CCS 2/2 REDF/NRFHT - uncomplicated.   HD 20-21: POD #2-3 s/p LTCS. Patient doing well with no complaints. Denies SF.  Procardia decreased to 30 XL daily.     Myrtle Valdez is a 22 y.o. female POD #2 status post primary classical C/S at 27w5d in a pregnancy complicated by PreE w/ SF (BP), FGR, REDF, hyperechoic fetal bowel, asthma, and h/o chlamydia. Patient is doing well this morning. She denies nausea, vomiting.  Patient reports mild abdominal pain that is adequately relieved by oral pain medications. Lochia is mild. She is voiding and ambulating without difficulty. She has passed flatus, and has not had BM.  Denies HA/vision changes, CP/SOB, RUQ pain, LE edema.  Patient is currently pumping for infant in the NICU. Desires Depo Provera for contraception.      Temp:  [95.9 °F (35.5 °C)-98.8 °F (37.1 °C)] 97.2 °F (36.2 °C)  Pulse:  [] 95  Resp:  [16-18] 16  SpO2:  [90 %-100 %] 99 %  BP: (112-147)/(65-95) 117/65    Physical Exam:   Constitutional: She appears well-developed and well-nourished. No distress.       Cardiovascular: Normal rate and regular rhythm.     Pulmonary/Chest: Effort normal and breath sounds normal.        Abdominal: Soft. Bowel sounds are normal. She exhibits abdominal incision (c/d/i). She exhibits no distension. There is tenderness (appropriate postoperative).             Musculoskeletal: She exhibits no edema.       Neurological: She is alert. She has normal reflexes.    Skin: Skin is warm and dry.    Psychiatric: She has a normal mood and affect. Her behavior is normal. Judgment and thought content normal.       I/O    Intake/Output Summary (Last 24 hours) at 09/10/17 0754  Last data filed at 09/10/17 0600   Gross per 24 hour   Intake             2403 ml   Output             4960 ml   Net            -2557 ml     Assessment/Plan:     22 y.o. female  at 27w4d for:    * Severe preeclampsia    - S/p MgSO4  - BP controlled on Procardia XL 60AM/30PM. Decrease today to 30XL daily.        Status post  section    - Patient doing well. Continue routine management and advances.  - Pain well  controlled.  - Heme: H/h 12.3/36-->10/32  - Encourage ambulation  - Circumcision: infant in NICU  - Contraception: Depo Provera  - Lactation: pumping for infant in NICU          History of hepatitis C    - Hep C viral load negative  - hepatic function panel normal        Hyperthyroidism    - no meds  - TSH low, FT4 normal  - thyroid antibodies negative        Hx of chlamydia infection    - GC/CT 8/22 was negative        Asthma    - well controlled, no meds  - continue to monitor              Ramiro Del Valle MD  Obstetrics  [facility]

## 2017-09-12 NOTE — TELEPHONE ENCOUNTER
Receipt via fax from JORGE LUIS Tomlinson with [facility] of patient's [id number] report, which was Non-Reportable.    Dr. Tierney notified and aware.    Results scanned in to patient's EPIC Chart.    Patient delivered 09/09/2017 at 27 weeks 4 days.

## 2017-09-12 NOTE — PLAN OF CARE
Problem: Patient Care Overview  Goal: Plan of Care Review  VSS. No significant events overnight. Incision CDI. Fundus firm and midline; lochia WNL. Pain controlled with PO medication. Pt using breast pump this shift without complications, yet no production. Ambulates to bathroom; voiding without difficulty. Pt tolerating PO. Plan of care reviewed with pt, pt verbalized understanding, all questions answered; no needs at this time.

## 2017-09-13 VITALS
WEIGHT: 205 LBS | OXYGEN SATURATION: 100 % | TEMPERATURE: 96 F | SYSTOLIC BLOOD PRESSURE: 144 MMHG | RESPIRATION RATE: 18 BRPM | HEART RATE: 80 BPM | DIASTOLIC BLOOD PRESSURE: 88 MMHG | HEIGHT: 63 IN | BODY MASS INDEX: 36.32 KG/M2

## 2017-09-13 PROCEDURE — 90471 IMMUNIZATION ADMIN: CPT | Performed by: STUDENT IN AN ORGANIZED HEALTH CARE EDUCATION/TRAINING PROGRAM

## 2017-09-13 PROCEDURE — 63600175 PHARM REV CODE 636 W HCPCS: Performed by: STUDENT IN AN ORGANIZED HEALTH CARE EDUCATION/TRAINING PROGRAM

## 2017-09-13 PROCEDURE — 25000003 PHARM REV CODE 250: Performed by: STUDENT IN AN ORGANIZED HEALTH CARE EDUCATION/TRAINING PROGRAM

## 2017-09-13 PROCEDURE — 99239 HOSP IP/OBS DSCHRG MGMT >30: CPT | Mod: ,,, | Performed by: OBSTETRICS & GYNECOLOGY

## 2017-09-13 PROCEDURE — 90715 TDAP VACCINE 7 YRS/> IM: CPT | Performed by: STUDENT IN AN ORGANIZED HEALTH CARE EDUCATION/TRAINING PROGRAM

## 2017-09-13 RX ORDER — CETIRIZINE HYDROCHLORIDE 5 MG/1
10 TABLET ORAL DAILY
Status: DISCONTINUED | OUTPATIENT
Start: 2017-09-13 | End: 2017-09-13 | Stop reason: HOSPADM

## 2017-09-13 RX ORDER — NIFEDIPINE 30 MG/1
30 TABLET, EXTENDED RELEASE ORAL DAILY
Qty: 90 TABLET | Refills: 1 | Status: SHIPPED | OUTPATIENT
Start: 2017-09-14 | End: 2020-10-06

## 2017-09-13 RX ORDER — OXYCODONE AND ACETAMINOPHEN 5; 325 MG/1; MG/1
1 TABLET ORAL EVERY 4 HOURS PRN
Qty: 20 TABLET | Refills: 0 | OUTPATIENT
Start: 2017-09-13 | End: 2020-07-13

## 2017-09-13 RX ORDER — IBUPROFEN 600 MG/1
600 TABLET ORAL EVERY 6 HOURS
Qty: 60 TABLET | Refills: 0 | OUTPATIENT
Start: 2017-09-13 | End: 2020-07-13

## 2017-09-13 RX ADMIN — IBUPROFEN 600 MG: 600 TABLET, FILM COATED ORAL at 01:09

## 2017-09-13 RX ADMIN — CLOSTRIDIUM TETANI TOXOID ANTIGEN (FORMALDEHYDE INACTIVATED), CORYNEBACTERIUM DIPHTHERIAE TOXOID ANTIGEN (FORMALDEHYDE INACTIVATED), BORDETELLA PERTUSSIS TOXOID ANTIGEN (GLUTARALDEHYDE INACTIVATED), BORDETELLA PERTUSSIS FILAMENTOUS HEMAGGLUTININ ANTIGEN (FORMALDEHYDE INACTIVATED), BORDETELLA PERTUSSIS PERTACTIN ANTIGEN, AND BORDETELLA PERTUSSIS FIMBRIAE 2/3 ANTIGEN 0.5 ML: 5; 2; 2.5; 5; 3; 5 INJECTION, SUSPENSION INTRAMUSCULAR at 01:09

## 2017-09-13 RX ADMIN — CETIRIZINE HYDROCHLORIDE 10 MG: 5 TABLET, FILM COATED ORAL at 01:09

## 2017-09-13 RX ADMIN — DOCUSATE SODIUM 200 MG: 100 CAPSULE, LIQUID FILLED ORAL at 08:09

## 2017-09-13 RX ADMIN — IBUPROFEN 600 MG: 600 TABLET, FILM COATED ORAL at 08:09

## 2017-09-13 RX ADMIN — NIFEDIPINE 30 MG: 30 TABLET, FILM COATED, EXTENDED RELEASE ORAL at 08:09

## 2017-09-13 NOTE — DISCHARGE SUMMARY
[facility]  Obstetrics  Discharge Summary      Patient Name: Myrtle Valdez  MRN: [id number]  Admission Date: 2017  Hospital Length of Stay: 22 days  Discharge Date and Time:  2017 9:05 AM  Attending Physician: Martha Mcdermott MD   Discharging Provider: Ramiro Del Valle MD  Primary Care Provider: Primary Doctor No    HPI: Myrtle Valdez is a 22 y.o. [id number] at 25w0d presents as a transport from [facility] secondary to PreE w/SF and FGR with AEDF.    This IUP is complicated by newly diagnosed PreE w/SF (severe range BP), fetal growth restriction (1%, AEDF, fetal hyperechoic bowel and possible VSD), asthma (well controlled, no meds), hx hyperthyroidism (no meds), questionable hx of HepC, hx of chlamydia.    Currently patient has no complaints. Patient denies headache, scotoma, RUQ pain, N/V, CP, SOB. Patient denies contractions, vaginal bleeding or discharge, denies LOF.   Fetal Movement: normal.     Procedure(s) (LRB):  DELIVERY- SECTION (N/A)     Hospital Course:   2017 - admitted to antepartum at 25w0d for newly diagnosed PreE w/SF in a pregnancy complicated by FGR (AEDF). BMZ series and Mag started at OSH. BP remained elevated, started on Procardia 30XL.  2017 - Doing well from PreE standpoint, asymptomatic, labs stable, BP now mild range.  2017 - - Continues to be stable from PrE standpoint. Platelets stable, AST stable. Continue FHR dopplers q shift.  HD7-14: BP regimen changed to Procardia XL 60AM/30PM due to elevated PM BP. Labs stable. Umbilical artery flow remains absent on doppler. Ob glucose screen 169. Will order 3hour GTT this week.   HD15-16: Growth scan 420 g, <1%ile. Continue NST BID. Rescue course of BMZ completed.  HD17: BP to 150 overnight, Procardia given early.  HD18: BP in mild range intermittently overnight.  No acute issues.   HD19: NST with new onset variable decelerations. REDF on U/S. CCS 2/2 REDF/NRFHT - uncomplicated.    HD 20-22: POD #2-4 s/p LTCS. Patient doing well with no complaints. Denies SF. Procardia decreased to 30 XL daily with BP remaining in normal to mild range. Patient stable for discharge on HD#22/POD#4.    Consults         Status Ordering Provider     Consult to Lactation  Use PRN     Provider:  (Not yet assigned)    Acknowledged CUAUHTEMOC ALEXANDRE     Inpatient consult to Neonatology  Once     Provider:  (Not yet assigned)    Completed BRET PAUL     Inpatient consult to Pediatric Cardiology  Once     Provider:  (Not yet assigned)    Completed YORDAN SALINAS     Inpatient consult to Social Work  Once     Provider:  (Not yet assigned)    Completed BRET PAUL          Final Active Diagnoses:    Diagnosis Date Noted POA    PRINCIPAL PROBLEM:  Severe preeclampsia [O14.10] 2017 Yes    History of classical  section [Z98.891] 2017 Not Applicable    Status post  section [Z98.891] 2017 Not Applicable    Hx of chlamydia infection [Z86.19] 2017 Unknown    Hyperthyroidism [E05.90] 2017 Yes    History of hepatitis C [Z86.19] 2017 Yes    Asthma [J45.909] 2014 Yes      Problems Resolved During this Admission:    Diagnosis Date Noted Date Resolved POA    Preeclampsia, severe [O14.10] 2017 Yes    Mild intermittent asthma without complication [J45.20] 2017 Yes    Tachycardia [R00.0] 2017 Yes    Intrauterine growth restriction affecting antepartum care of mother in second trimester [O36.5920] 2017 Yes    Preeclampsia, severe [O14.10] 2017 Yes    26 weeks gestation of pregnancy [Z3A.26] 2017 Not Applicable        Labs: BMP: No results for input(s): GLU, NA, K, CL, CO2, BUN, CREATININE, CALCIUM, MG in the last 48 hours., CMP No results for input(s): NA, K, CL, CO2, GLU, BUN, CREATININE, CALCIUM, PROT, ALBUMIN, BILITOT, ALKPHOS, AST, ALT, ANIONGAP,  ESTGFRAFRICA, EGFRNONAA in the last 48 hours. and CBC No results for input(s): WBC, HGB, HCT, PLT in the last 48 hours.  Specimen (12h ago through future)    None          Feeding Method: breast    Immunizations     Date Immunization Status Dose Route/Site Given by    09/10/17 0442 MMR Incomplete 0.5 mL Subcutaneous/Left deltoid     09/10/17 0442 Tdap Incomplete 0.5 mL Intramuscular/Left deltoid           Delivery:    Episiotomy: None   Lacerations: None   Repair suture: None   Repair # of packets:     Blood loss (ml): 0     Birth information:  YOB: 2016   Time of birth: 2:57 PM   Sex: male   Delivery type: , Classical   Gestational Age: 27w4d    Delivery Clinician:      Other providers:       Additional  information:  Forceps:    Vacuum:    Breech:    Observed anomalies      Living?:           APGARS  One minute Five minutes Ten minutes   Skin color:         Heart rate:         Grimace:         Muscle tone:         Breathing:         Totals: 2  9        Placenta: Delivered:       appearance    Pending Diagnostic Studies:     None          Discharged Condition: good    Disposition: Home or Self Care    Follow Up:  Follow-up Information     Elier Sanchez MD. Schedule an appointment as soon as possible for a visit in 1 week.    Specialty:  Obstetrics and Gynecology  Why:  BP check  Contact information:  [address] [place] [id number] [phone]                 Patient Instructions:     Diet general     Activity as tolerated     Call MD for:  temperature >100.4     Call MD for:  persistent nausea and vomiting or diarrhea     Call MD for:  severe uncontrolled pain     Call MD for:  redness, tenderness, or signs of infection (pain, swelling, redness, odor or green/yellow discharge around incision site)     Call MD for:  difficulty breathing or increased cough     Call MD for:  severe persistent headache     Call MD for:  persistent dizziness, light-headedness, or visual  disturbances     Call MD for:  increased confusion or weakness       Medications:  Current Discharge Medication List      START taking these medications    Details   ibuprofen (ADVIL,MOTRIN) 600 MG tablet Take 1 tablet (600 mg total) by mouth every 6 (six) hours.  Qty: 60 tablet, Refills: 0      nifedipine (PROCARDIA-XL) 30 MG (OSM) 24 hr tablet Take 1 tablet (30 mg total) by mouth once daily.  Qty: 90 tablet, Refills: 1      oxycodone-acetaminophen (PERCOCET) 5-325 mg per tablet Take 1 tablet by mouth every 4 (four) hours as needed.  Qty: 20 tablet, Refills: 0         CONTINUE these medications which have NOT CHANGED    Details   neomycin-polymyxin-hydrocortisone (CORTISPORIN) 3.5-10,000-1 mg/mL-unit/mL-% otic suspension Place 4 drops into the left ear 3 (three) times daily.  Qty: 10 mL, Refills: 0      PRENATAL VIT CALC,IRON,FOLIC (PRENATAL VITAMIN ORAL) Take by mouth.         STOP taking these medications       desonide 0.05% (DESOWEN) 0.05 % Oint Comments:   Reason for Stopping:               Ramiro Del Valle MD  Obstetrics  [facility]

## 2017-09-13 NOTE — PLAN OF CARE
Problem: Patient Care Overview  Goal: Plan of Care Review  Outcome: Ongoing (interventions implemented as appropriate)  Provided NICU lactation discharge education; developed the following breastfeeding plan of care with patient: Preparation and Hygiene:    1. Shower daily.  2. Wear a clean bra each day and wash daily in warm soapy water.  3. Change wet or moist breast pads frequently.  Moist pads can promote growth of germs.  4. Actively wash your hands, paying close attention to the area around and under your fingernails, thoroughly with soap and water for 15 seconds before pumping or handling your milk.  Re-wash your hands if you touch anything (scratching your nose, answering the phone, etc) while pumping or handling your milk.   5. Before pumping your breasts, assemble the pump collection kit and have ready the sterile container and labels.  Place these items on a clean surface next to the breastpump.  6. Each time after you have finished pumping, take apart all of the parts of the breastpump collection kit and place them in a separate cleaning container (do not place them in the sink).  Be sure to remove the yellow valve from the breastshield and separate the white membrane from the yellow valve.  Rinse all of these parts with cool water.  Then use a new sponge and/or bottle brush and dishwashing detergent to clean the parts.  Rinse off the soapy water with cool water and air dry on a clean towel covered with a clean cloth.  All parts may also be washed after each use in the top rack of a .  7. Once each day, sterilize all of the parts of the breastpump collection kit.  This can be done by boiling the kit parts for 10 minutes or by using a Quick Clean Micro-Steam Bag made by [employer]  8. If condensation appears in the tubing, continue to run the pump with the tubing attached for 1-2 minutes or until the tubing is dry.   9. Notify your babys nurse or doctor if you become ill or need to take any  medication, even over-the-counter medicines.        Collection and Storage of Expressed Breastmilk:         1. Pump your breasts at least 8-10 times every 24 hours.  Double pump (both breasts at  the same time) for at least 15-20 minutes using the most suction that is comfortable.    2. Write the date and time of pumping and the name of any medications you are takingon the babys pre-printed hospital identification label.   3. Place your babys pre-printed hospital identification label on each container of breastmilk.  Additional pre-printed labels can be obtained from your babys nurse.  If your expressed breastmilk is not correctly labeled, the nurse cannot feed the milk to the baby.       4. Place a brightly colored sticker on the top of each container of milk pumped during the first 30 days.  This identifies the milk as special and having higher levels of nutrients and anti-infective properties that are so important for your baby.  Additional stickers can be obtained from the lactation consultants or your babys nurse.  5.   Do not touch the inside of the storage containers or lids.  6.      Pour the amount of expressed milk needed for 1 of your babys feedings into each   storage container. Use a new container(s) for each pumping.  Additional storage   containers can be obtained from your babys nurse.        7.       Tightly screw the lid onto the container and place immediately into the       refrigerator fordaily transportation to the hospital.   Do not freeze your milk      unless asked to do so by your babys nurse.  However, if you are not able to      visit your baby each day, place the expressed breastmilk in the freezer.  8.   Expressed breastmilk should be refrigerated or frozen within 1 hour of      pumping.  9.      Do not store expressed breastmilk on the door of your refrigerator or freezer where the temperature is warmer.         Transportation of Expressed Breastmilk:    1. Refrigerated  breastmilk or frozen milk should be packed tightly together in a cooler with frozen, blue gel-packs to keep the milk frozen.  DO NOT USE ICE CUBES (WET ICE) TO TRANSPORT FROZEN MILK.   A clean towel can be used to fill any extra space between containers of frozen milk.  2.    Bring your expressed milk from home each time you visit the baby.

## 2017-09-13 NOTE — PLAN OF CARE
"Problem: Patient Care Overview  Goal: Plan of Care Review  Outcome: Ongoing (interventions implemented as appropriate)  Patient stated "I think I'm coming down with a cold this morning". Dr Del Valle notified, orders given for Zyrtec today. Pain well controled, patient ambulating, voiding with out trouble, and BM's noted since delivery.     Problem: Hypertensive Disorders in Pregnancy (Adult,Obstetrics,Pediatric)  Goal: Signs and Symptoms of Listed Potential Problems Will be Absent, Minimized or Managed (Hypertensive Disorders in Pregnancy)  Signs and symptoms of listed potential problems will be absent, minimized or managed by discharge/transition of care (reference Hypertensive Disorders in Pregnancy (Adult,Obstetrics,Pediatric) CPG).   Outcome: Ongoing (interventions implemented as appropriate)  BP elevated but WNL. MD aware, may consider chnaging PO Procardia dosage today.     Problem: Postpartum ( Delivery) (Adult,Obstetrics,Pediatric)  Goal: Signs and Symptoms of Listed Potential Problems Will be Absent, Minimized or Managed (Postpartum)  Signs and symptoms of listed potential problems will be absent, minimized or managed by discharge/transition of care (reference Postpartum ( Delivery) (Adult,Obstetrics,Pediatric) CPG).   Outcome: Ongoing (interventions implemented as appropriate)  Incision healing well. No drainage or dressing. Lochia light. Fundus firm. Abdominal binder at bedside, patient is utilizing binder when ambulating.     Problem: Breastfeeding (Adult,Obstetrics,Pediatric)  Goal: Signs and Symptoms of Listed Potential Problems Will be Absent, Minimized or Managed (Breastfeeding)  Signs and symptoms of listed potential problems will be absent, minimized or managed by discharge/transition of care (reference Breastfeeding (Adult,Obstetrics,Pediatric) CPG).   Outcome: Ongoing (interventions implemented as appropriate)  patient pumped at least once this shift. More frequent pumping " encouraged. Educated on how to clean breast and pump parts.

## 2017-09-13 NOTE — PLAN OF CARE
Problem: Patient Care Overview  Goal: Plan of Care Review  Vital signs stable, afebrile; BP WDL with procardia, incision healing; continues to breastpump; visited baby in NICU several times; discharge instructions given and discharged to home c mother via medicaid transport

## 2017-09-13 NOTE — LACTATION NOTE
09/13/17 0840   Maternal Infant Assessment   Breast Density soft;filling;Bilateral:   Areola elastic;Bilateral:   Nipple(s) everted;Bilateral:   Pain/Comfort Assessments   Pain Assessment Performed Yes       Number Scale   Presence of Pain denies   Location - Side Bilateral   Location nipple(s)   Pain Rating: Activity 0   Maternal Infant Feeding   Maternal Preparation hand hygiene   Maternal Emotional State assist needed   Comfort Measures Before/During Feeding (offered, patient declined)   Time Spent (min) 30-60 min   Comfort Measures Following Feeding expressed milk applied   Breastfeeding Education diet;label/storage of breast milk;medication effects;milk expression, electric pump;milk expression, hand;milk expression, manual pump;prenatal vitamins continued   Equipment Type/Education   Pump Type Symphony;Other (Comment)  (manual breastpump)   Breast Pump Type double electric, hospital grade;manual   Breast Pump Flange Type hard   Breast Pump Flange Size 24 mm   Breast Pumping Bilateral Breasts:;milk labeled/stored;pumped until emptied   Pumping Frequency (times) (patient has used breastpump 4 times in past 24 hours)   Lactation Referrals   Lactation Consult Follow up;Knowledge deficit;Pump teaching   Lactation Referrals outpatient lactation program   Lactation Interventions   Attachment Promotion counseling provided;family involvement promoted;infant-mother separation minimized;privacy provided;role responsibility promoted  (use breastpump at baby's bedside)   Breastfeeding Assistance electric breast pump used;hand held breast pump used;milk expression/pumping;support offered   Maternal Breastfeeding Support diary/feeding log utilized;encouragement offered;infant-mother separation minimized;lactation counseling provided;maternal hydration promoted;maternal nutrition promoted;maternal rest encouraged   Praised patient for providing breastmilk for her baby; with her permission assisted with use of Symphony  breastpump, double set up, and manual breastpump, single and double set up; assisted with hand expression after use of breastpump; patient then independently expressed several gtts of colostrum into storage container; she has used breastpump  4 times  in past 24 hours; provided NICU lactation education; reviewed NICU lactation folder; patient has [facility] appointment to receive breastpump at 0800 tomorrow;

## 2017-09-13 NOTE — CHAPLAIN
visited pt and patient's infant son together in NICU. This note is copied from infant's chart:    Pt mother and gm known to this  from antepartum hospital stay.  explored patient concerns and provided reflective listening, spiritual support and prayer.    affirmed to mom that Spiritual Care would continue to provide support and prayer for infant and family during infant's stay in NICU. Pt mother voiced understanding and appreciation.     Meghan RAGSDALE   [id number]

## 2017-09-13 NOTE — PROGRESS NOTES
[facility]  Obstetrics  Postpartum Progress Note    Patient Name: Myrtle Valdez  MRN: [id number]  Admission Date: 8/22/2017  Hospital Length of Stay: 22 days  Attending Physician: Martha Mcdermott MD  Primary Care Provider: Primary Doctor No    Subjective:     Principal Problem:Severe preeclampsia     Myrtle Valdez is a 22 y.o. female POD #4 status post primary classical C/S at 27w5d in a pregnancy complicated by PreE w/ SF (BP), FGR, REDF, hyperechoic fetal bowel, asthma, and h/o chlamydia. Patient is doing well this morning. She denies nausea, vomiting.  Patient reports mild abdominal pain that is adequately relieved by oral pain medications. Lochia is mild. She is voiding and ambulating without difficulty. She has passed flatus, and has not had BM.  Denies HA/vision changes, CP/SOB, RUQ pain, LE edema.  Patient is currently pumping for infant in the NICU. Desires Depo Provera for contraception.      Temp:  [95.9 °F (35.5 °C)-98.8 °F (37.1 °C)] 97.2 °F (36.2 °C)  Pulse:  [] 95  Resp:  [16-18] 16  SpO2:  [90 %-100 %] 99 %  BP: (112-147)/(65-95) 117/65    Physical Exam:   Constitutional: She appears well-developed and well-nourished. No distress.       Cardiovascular: Normal rate and regular rhythm.     Pulmonary/Chest: Effort normal and breath sounds normal.        Abdominal: Soft. Bowel sounds are normal. She exhibits abdominal incision (c/d/i). She exhibits no distension. There is tenderness (appropriate postoperative).             Musculoskeletal: She exhibits no edema.       Neurological: She is alert. She has normal reflexes.    Skin: Skin is warm and dry.    Psychiatric: She has a normal mood and affect. Her behavior is normal. Judgment and thought content normal.       I/O    Intake/Output Summary (Last 24 hours) at 09/10/17 0754  Last data filed at 09/10/17 0600   Gross per 24 hour   Intake             2403 ml   Output             4960 ml   Net            -2557 ml      Assessment/Plan:     22 y.o. female  for:    * Severe preeclampsia    - S/p MgSO4  - Continue Procardia 30XL daily.        Status post  section    - Patient doing well. Continue routine management and advances.  - Pain well controlled.  - Heme: H/h 12.3/36-->10/32  - Encourage ambulation  - Circumcision: infant in NICU  - Contraception: Depo Provera  - Lactation: pumping for infant in NICU          History of hepatitis C    - Hep C viral load negative  - hepatic function panel normal        Hyperthyroidism    - no meds  - TSH low, FT4 normal  - thyroid antibodies negative        Hx of chlamydia infection    - GC/CT  was negative        Asthma    - well controlled, no meds  - continue to monitor            Disposition: As patient meets milestones, will plan to discharge POD #4.    Ramiro Del Valle MD  Obstetrics  [facility]

## 2017-09-13 NOTE — DISCHARGE INSTRUCTIONS
Preparation and Hygiene:    1. Shower daily.  2. Wear a clean bra each day and wash daily in warm soapy water.  3. Change wet or moist breast pads frequently.  Moist pads can promote growth of germs.  4. Actively wash your hands, paying close attention to the area around and under your fingernails, thoroughly with soap and water for 15 seconds before pumping or handling your milk.  Re-wash your hands if you touch anything (scratching your nose, answering the phone, etc) while pumping or handling your milk.   5. Before pumping your breasts, assemble the pump collection kit and have ready the sterile container and labels.  Place these items on a clean surface next to the breastpump.  6. Each time after you have finished pumping, take apart all of the parts of the breastpump collection kit and place them in a separate cleaning container (do not place them in the sink).  Be sure to remove the yellow valve from the breastshield and separate the white membrane from the yellow valve.  Rinse all of these parts with cool water.  Then use a new sponge and/or bottle brush and dishwashing detergent to clean the parts.  Rinse off the soapy water with cool water and air dry on a clean towel covered with a clean cloth.  All parts may also be washed after each use in the top rack of a .  7. Once each day, sterilize all of the parts of the breastpump collection kit.  This can be done by boiling the kit parts for 10 minutes or by using a Quick Clean Micro-Steam Bag made by [employer]  8. If condensation appears in the tubing, continue to run the pump with the tubing attached for 1-2 minutes or until the tubing is dry.   9. Notify your babys nurse or doctor if you become ill or need to take any medication, even over-the-counter medicines.        Collection and Storage of Expressed Breastmilk:         1. Pump your breasts at least 8-10 times every 24 hours.  Double pump (both breasts at  the same time) for at least 15-20  minutes using the most suction that is comfortable.    2. Write the date and time of pumping and the name of any medications you are takingon the babys pre-printed hospital identification label.   3. Place your babys pre-printed hospital identification label on each container of breastmilk.  Additional pre-printed labels can be obtained from your babys nurse.  If your expressed breastmilk is not correctly labeled, the nurse cannot feed the milk to the baby.       4. Place a brightly colored sticker on the top of each container of milk pumped during the first 30 days.  This identifies the milk as special and having higher levels of nutrients and anti-infective properties that are so important for your baby.  Additional stickers can be obtained from the lactation consultants or your babys nurse.  5.   Do not touch the inside of the storage containers or lids.  6.      Pour the amount of expressed milk needed for 1 of your babys feedings into each   storage container. Use a new container(s) for each pumping.  Additional storage   containers can be obtained from your babys nurse.        7.       Tightly screw the lid onto the container and place immediately into the       refrigerator fordaily transportation to the hospital.   Do not freeze your milk      unless asked to do so by your babys nurse.  However, if you are not able to      visit your baby each day, place the expressed breastmilk in the freezer.  8.   Expressed breastmilk should be refrigerated or frozen within 1 hour of      pumping.  9.      Do not store expressed breastmilk on the door of your refrigerator or freezer where the temperature is warmer.         Transportation of Expressed Breastmilk:    1. Refrigerated breastmilk or frozen milk should be packed tightly together in a cooler with frozen, blue gel-packs to keep the milk frozen.  DO NOT USE ICE CUBES (WET ICE) TO TRANSPORT FROZEN MILK.   A clean towel can be used to fill any extra space  between containers of frozen milk.  2.    Bring your expressed milk from home each time you visit the baby.          Primary Engorgement    If the milk is flowing, use wet or dry heat applied to the breasts for approximately 10min prior to each breastpumping as a comfort measure to facilitate the milk ejection reflex    Follow heat treatment with breast massage to soften hard/lumpy areas of the breast    Hand express or pump breasts to the point of comfort as needed    Use cold treatments in the form of ice packs/gel packs/ frozen vegetables wrapped in a soft thin cloth and applied to the breasts for approximately 20min after each breastpuming until engorgement is resolved    Wear comfortable, supportive bra    If needed, take pain medicine as ordered by your doctor    If needed, take anti-inflammatory medications as ordered by your doctor          Call your doctor to be seen for temp, bleeding more than 1 pad/hour or passing clots, pain not relieved with pain medication, weakness, passing out, shortness of breath, signs of infection in your incision/foul-smelling vaginal discharge  Also call for signs of preeclampsia--headache that is severe or is not relieved with pain meds, seeing spots/flashes of light, pain under your right breast  Be sure you see your doctor for all followup appointments.  Take your meds as ordered

## 2018-06-12 NOTE — PLAN OF CARE
Problem: Patient Care Overview  Goal: Plan of Care Review  Outcome: Ongoing (interventions implemented as appropriate)  Pt doing well.  VS stable. No acute changes this shift. Pt denies LOF, vaginal bleeding,  and contractions.  Pt reports positive fetal movement.  Pt denies headaches, blurry vision, and epigastric pain. Will cont to monitor.        no

## 2020-12-22 PROBLEM — R73.01 ELEVATED FASTING BLOOD SUGAR: Status: ACTIVE | Noted: 2020-12-22

## 2021-01-16 NOTE — PROGRESS NOTES
Per residents, patient met with neonatology. Patient amenable to current plan of FHT doptones only at this point.   no back pain, no gout, no musculoskeletal pain, no neck pain, and no weakness.

## 2021-05-06 ENCOUNTER — PATIENT MESSAGE (OUTPATIENT)
Dept: RESEARCH | Facility: HOSPITAL | Age: 26
End: 2021-05-06

## 2023-01-05 NOTE — ASSESSMENT & PLAN NOTE
- well controlled, no meds  - continue to monitor   Statement Selected with sedation without sedation

## 2023-08-30 ENCOUNTER — PATIENT OUTREACH (OUTPATIENT)
Dept: ADMINISTRATIVE | Facility: HOSPITAL | Age: 28
End: 2023-08-30
Payer: MEDICAID

## 2023-09-15 ENCOUNTER — PATIENT OUTREACH (OUTPATIENT)
Dept: ADMINISTRATIVE | Facility: HOSPITAL | Age: 28
End: 2023-09-15
Payer: MEDICAID

## 2023-11-07 NOTE — PLAN OF CARE
Problem: Patient Care Overview  Goal: Plan of Care Review  Outcome: Ongoing (interventions implemented as appropriate)  Pt doing well.  VS stable. No acute changes this shift. Pt denies LOF, vaginal bleeding,  and contractions.  Pt reports positive fetal movement.    Pt denies headaches, visual disturbance, and RUQ pain. Urinary output and reflexes WNL.           err

## 2023-11-27 ENCOUNTER — PATIENT OUTREACH (OUTPATIENT)
Dept: ADMINISTRATIVE | Facility: HOSPITAL | Age: 28
End: 2023-11-27
Payer: MEDICAID

## 2024-04-10 ENCOUNTER — PATIENT OUTREACH (OUTPATIENT)
Dept: ADMINISTRATIVE | Facility: HOSPITAL | Age: 29
End: 2024-04-10
Payer: MEDICAID

## 2024-04-10 LAB — PAP RECOMMENDATION EXT: NORMAL

## 2024-04-18 ENCOUNTER — PATIENT OUTREACH (OUTPATIENT)
Dept: ADMINISTRATIVE | Facility: HOSPITAL | Age: 29
End: 2024-04-18
Payer: MEDICAID

## 2024-11-13 LAB — PAP RECOMMENDATION EXT: NORMAL

## 2025-03-25 ENCOUNTER — TELEPHONE (OUTPATIENT)
Dept: MATERNAL FETAL MEDICINE | Facility: CLINIC | Age: 30
End: 2025-03-25
Payer: MEDICAID

## 2025-03-25 ENCOUNTER — PATIENT MESSAGE (OUTPATIENT)
Dept: MATERNAL FETAL MEDICINE | Facility: CLINIC | Age: 30
End: 2025-03-25

## 2025-03-25 DIAGNOSIS — E05.90 SUBCLINICAL HYPERTHYROIDISM: ICD-10-CM

## 2025-03-25 DIAGNOSIS — Z36.89 ENCOUNTER FOR ULTRASOUND TO ASSESS FETAL GROWTH: ICD-10-CM

## 2025-03-25 DIAGNOSIS — O24.119 TYPE 2 DIABETES MELLITUS AFFECTING PREGNANCY, ANTEPARTUM: Primary | ICD-10-CM

## 2025-04-14 ENCOUNTER — CLINICAL SUPPORT (OUTPATIENT)
Dept: PEDIATRIC CARDIOLOGY | Facility: CLINIC | Age: 30
End: 2025-04-14
Payer: MEDICAID

## 2025-04-14 ENCOUNTER — OFFICE VISIT (OUTPATIENT)
Dept: MATERNAL FETAL MEDICINE | Facility: CLINIC | Age: 30
End: 2025-04-14
Attending: OBSTETRICS & GYNECOLOGY
Payer: MEDICAID

## 2025-04-14 ENCOUNTER — OFFICE VISIT (OUTPATIENT)
Dept: PEDIATRIC CARDIOLOGY | Facility: CLINIC | Age: 30
End: 2025-04-14
Attending: PEDIATRICS
Payer: MEDICAID

## 2025-04-14 ENCOUNTER — PROCEDURE VISIT (OUTPATIENT)
Dept: MATERNAL FETAL MEDICINE | Facility: CLINIC | Age: 30
End: 2025-04-14
Payer: MEDICAID

## 2025-04-14 VITALS
SYSTOLIC BLOOD PRESSURE: 124 MMHG | HEIGHT: 63 IN | BODY MASS INDEX: 38.98 KG/M2 | DIASTOLIC BLOOD PRESSURE: 71 MMHG | WEIGHT: 220 LBS

## 2025-04-14 VITALS
HEIGHT: 63 IN | DIASTOLIC BLOOD PRESSURE: 71 MMHG | WEIGHT: 220 LBS | BODY MASS INDEX: 38.98 KG/M2 | HEART RATE: 118 BPM | SYSTOLIC BLOOD PRESSURE: 124 MMHG

## 2025-04-14 DIAGNOSIS — Z3A.27 27 WEEKS GESTATION OF PREGNANCY: Primary | ICD-10-CM

## 2025-04-14 DIAGNOSIS — E05.90 SUBCLINICAL HYPERTHYROIDISM: ICD-10-CM

## 2025-04-14 DIAGNOSIS — Z36.83 ENCOUNTER FOR SCREENING FOR CONGENITAL CARDIAC ABNORMALITIES IN FETUS: ICD-10-CM

## 2025-04-14 DIAGNOSIS — Z36.89 ENCOUNTER FOR ULTRASOUND TO ASSESS FETAL GROWTH: ICD-10-CM

## 2025-04-14 DIAGNOSIS — Z98.891 HISTORY OF CLASSICAL CESAREAN SECTION: ICD-10-CM

## 2025-04-14 DIAGNOSIS — O24.119 TYPE 2 DIABETES MELLITUS AFFECTING PREGNANCY, ANTEPARTUM: ICD-10-CM

## 2025-04-14 DIAGNOSIS — O24.012 PRE-EXISTING TYPE 1 DIABETES MELLITUS DURING PREGNANCY IN SECOND TRIMESTER: ICD-10-CM

## 2025-04-14 DIAGNOSIS — O24.012 PRE-EXISTING TYPE 1 DIABETES MELLITUS DURING PREGNANCY IN SECOND TRIMESTER: Primary | ICD-10-CM

## 2025-04-14 PROCEDURE — 76825 ECHO EXAM OF FETAL HEART: CPT | Mod: 26,S$PBB,, | Performed by: PEDIATRICS

## 2025-04-14 PROCEDURE — 1159F MED LIST DOCD IN RCRD: CPT | Mod: CPTII,,, | Performed by: OBSTETRICS & GYNECOLOGY

## 2025-04-14 PROCEDURE — 3078F DIAST BP <80 MM HG: CPT | Mod: CPTII,,, | Performed by: OBSTETRICS & GYNECOLOGY

## 2025-04-14 PROCEDURE — 76827 ECHO EXAM OF FETAL HEART: CPT | Mod: 26,S$PBB,, | Performed by: PEDIATRICS

## 2025-04-14 PROCEDURE — 99999 PR PBB SHADOW E&M-EST. PATIENT-LVL III: CPT | Mod: PBBFAC,,, | Performed by: OBSTETRICS & GYNECOLOGY

## 2025-04-14 PROCEDURE — 1159F MED LIST DOCD IN RCRD: CPT | Mod: CPTII,,, | Performed by: PEDIATRICS

## 2025-04-14 PROCEDURE — 1160F RVW MEDS BY RX/DR IN RCRD: CPT | Mod: CPTII,,, | Performed by: OBSTETRICS & GYNECOLOGY

## 2025-04-14 PROCEDURE — 99999 PR PBB SHADOW E&M-EST. PATIENT-LVL III: CPT | Mod: PBBFAC,,, | Performed by: PEDIATRICS

## 2025-04-14 PROCEDURE — 99213 OFFICE O/P EST LOW 20 MIN: CPT | Mod: PBBFAC,25 | Performed by: PEDIATRICS

## 2025-04-14 PROCEDURE — 76827 ECHO EXAM OF FETAL HEART: CPT | Mod: PBBFAC | Performed by: PEDIATRICS

## 2025-04-14 PROCEDURE — 3078F DIAST BP <80 MM HG: CPT | Mod: CPTII,,, | Performed by: PEDIATRICS

## 2025-04-14 PROCEDURE — 3074F SYST BP LT 130 MM HG: CPT | Mod: CPTII,,, | Performed by: PEDIATRICS

## 2025-04-14 PROCEDURE — 76816 OB US FOLLOW-UP PER FETUS: CPT | Mod: PBBFAC | Performed by: OBSTETRICS & GYNECOLOGY

## 2025-04-14 PROCEDURE — 93325 DOPPLER ECHO COLOR FLOW MAPG: CPT | Mod: PBBFAC | Performed by: PEDIATRICS

## 2025-04-14 PROCEDURE — 76816 OB US FOLLOW-UP PER FETUS: CPT | Mod: 26,S$PBB,, | Performed by: OBSTETRICS & GYNECOLOGY

## 2025-04-14 PROCEDURE — 3074F SYST BP LT 130 MM HG: CPT | Mod: CPTII,,, | Performed by: OBSTETRICS & GYNECOLOGY

## 2025-04-14 PROCEDURE — 93325 DOPPLER ECHO COLOR FLOW MAPG: CPT | Mod: 26,S$PBB,, | Performed by: PEDIATRICS

## 2025-04-14 PROCEDURE — 99214 OFFICE O/P EST MOD 30 MIN: CPT | Mod: S$PBB,TH,, | Performed by: OBSTETRICS & GYNECOLOGY

## 2025-04-14 PROCEDURE — 99999 PR PBB SHADOW E&M-EST. PATIENT-LVL I: CPT | Mod: PBBFAC,,,

## 2025-04-14 PROCEDURE — 3008F BODY MASS INDEX DOCD: CPT | Mod: CPTII,,, | Performed by: OBSTETRICS & GYNECOLOGY

## 2025-04-14 PROCEDURE — 76825 ECHO EXAM OF FETAL HEART: CPT | Mod: PBBFAC | Performed by: PEDIATRICS

## 2025-04-14 PROCEDURE — 99203 OFFICE O/P NEW LOW 30 MIN: CPT | Mod: 25,S$PBB,, | Performed by: PEDIATRICS

## 2025-04-14 PROCEDURE — 99213 OFFICE O/P EST LOW 20 MIN: CPT | Mod: PBBFAC,27,TH | Performed by: OBSTETRICS & GYNECOLOGY

## 2025-04-14 PROCEDURE — 99211 OFF/OP EST MAY X REQ PHY/QHP: CPT | Mod: PBBFAC,27

## 2025-04-14 PROCEDURE — 1160F RVW MEDS BY RX/DR IN RCRD: CPT | Mod: CPTII,,, | Performed by: PEDIATRICS

## 2025-04-14 PROCEDURE — 3008F BODY MASS INDEX DOCD: CPT | Mod: CPTII,,, | Performed by: PEDIATRICS

## 2025-04-14 RX ORDER — ASPIRIN 81 MG/1
81 TABLET ORAL
COMMUNITY

## 2025-04-14 NOTE — PROGRESS NOTES
"Maternal Fetal Medicine follow up consult    SUBJECTIVE:     Myrtle Valdez is a 29 y.o.  female with IUP at 27w3d who is seen in follow up consultation by [facility].  Pregnancy complications include:   Diabetes Mellitus in pregnancy  Subclinical hyperthyroidism  Hx of preeclampsia (early onset with  demise): APLS work up negative   Childhood asthma     Previous notes reviewed.   No changes to medical, surgical, family, social, or obstetric history.    Interval history since last M visit: No new issues    Medications reviewed.    Care team members:  Dr. Freeman - Primary OB       OBJECTIVE:   /71 (BP Location: Left arm, Patient Position: Sitting)   Ht 5' 2.99" (1.6 m)   Wt 99.8 kg (220 lb 0.3 oz)   LMP 10/04/2024 (Exact Date)   BMI 38.99 kg/m²     Ultrasound performed. See viewpoint for full ultrasound report.  The fetal anatomic survey was completed today, and no fetal structurInterval fetal growth has been normal, and the AFV is normal.   Fetal size is AGA with the EFW at the 33% and the AC at the 15%. The EFW is 977 g.  AFV is normal.     ASSESSMENT/PLAN:     29 y.o.  female with IUP at 27w3d    I discussed the findings on today's ultrasound.  Fetal growth is appropriate with no signs of growth restriction in the fetus.  Anatomy was completed today.  She also had a fetal echocardiogram which is normal.  Diabetes is controlled by endocrinology.  The patient was given an opportunity to ask questions about management and the diease process.  She expressed an understanding of and agreement to the above impression and plan. All questions were answered to her satisfaction.  She was given contact information to the [facility] clinic to address further concerns.      "

## 2025-04-14 NOTE — PROGRESS NOTES
I had the pleasure of evaluating Myrtle Valdez who is now 29 y.o.  and carrying her 3rd pregnancy at 27 3/7 weeks gestation. She was referred for evaluation of the fetal heart due to increased risks for congenital heart disease associated with maternal insulin-dependent diabetes.  Of note, maternal cell free DNA study is unremarkable.      Current Medications[1]  Review of patient's allergies indicates:  No Known Allergies    Maternal factors increasing risk for congenital heart disease:  First pregnancy with early fetal loss. Second pregnancy with preeclampsia and early  demise.  Paternal factors increasing risk for congenital heart disease:  None identified.    FETAL ECHOCARDIOGRAM (preliminary):  Normal fetal cardiac connections and function for estimated gestational age.  (Full report in electronic medical record)    I reviewed the strengths and weaknesses of fetal echocardiography including the insufficient sensitivity to rule out many septal defects, anomalies of pulmonary and systemic veins, arch anomalies, and some valvar abnormalities. I also discussed that  resolution of the ductus arteriosus and foramen ovale (normal fetal structures) cannot be assured by fetal evaluation. Finally, I reviewed today's echocardiogram that demonstrates normal anatomical connections and function for the estimated gestational age. Within the limitations imposed by fetal physiology, I would expect a high probability that this infant will be born with a normal heart.    I reviewed the concerns related to maternal diabetes. With normal fetal anatomy and function at this stage of development, the primary concern is the possibility of developing hypertrophic cardiomyopathy if there is difficulty controlling the maternal glucose and insulin. I carefully reviewed the possibility of this problem in the  period, the problems that it could cause and the high probability that it would regress after the  infant is no longer exposed to the maternal glucose and insulin. I also reinforced the need to regulate the insulin and glucose carefully during the remainder of the pregnancy to minimize the risk for this complication.     Ms. Valdez at a previous fetal echocardiogram performed when she was admitted with preeclampsia.  She recalls that meeting and discussion related to fetal cardiac development in the limitations of fetal echocardiography.  I did review this information with her again and I answered all of her questions. I provided an information sheet reviewing the fetal physiology and how this compares to normal  physiology. This includes a reiteration of the limitations of fetal echocardiography that I have discussed today.  I also suggested that she review the information related to fetal echocardiography available at the [employer] website and provided directions for accessing this site.  I encouraged her to call any additional questions.  I have not scheduled another evaluation; however, if questions arise later during gestation or after delivery, I would be happy to assist in any way. Ms. Valdez is comfortable with the plan.        RECOMMENDATIONS:  Cardiac evaluation of the infant after delivery if the clinical exam is abnormal.                 30 minutes of total time spent on the encounter, which includes face to face time and non-face to face time preparing to see the patient (eg, review of tests), obtaining and/or reviewing separately obtained history, documenting clinical information in the electronic or other health record, independently interpreting results (not separately reported) and communicating results to the patient/family/caregiver, or care coordination (not separately reported).           [1]   Current Outpatient Medications:     aspirin (ECOTRIN) 81 MG EC tablet, Take 81 mg by mouth., Disp: , Rfl:     blood sugar diagnostic (TRUE METRIX GLUCOSE TEST STRIP) Strp, 1  "strip by Misc.(Non-Drug; Combo Route) route 4 (four) times daily., Disp: 200 each, Rfl: 11    blood-glucose meter (BLOOD GLUCOSE MONITORING) kit, Use as instructed, Disp: 1 each, Rfl: 0    blood-glucose meter,continuous (DEXCOM G6 ) Misc, 1 each by Misc.(Non-Drug; Combo Route) route every 14 (fourteen) days., Disp: 1 each, Rfl: 3    blood-glucose sensor (DEXCOM G6 SENSOR) Nola, 1 Device by Misc.(Non-Drug; Combo Route) route every 10 days., Disp: 3 each, Rfl: 3    blood-glucose transmitter (DEXCOM G6 TRANSMITTER) Nola, 1 each by Misc.(Non-Drug; Combo Route) route once daily., Disp: 1 each, Rfl: 3    insulin glargine U-100, Lantus, (LANTUS SOLOSTAR U-100 INSULIN) 100 unit/mL (3 mL) InPn pen, Inject 8 Units into the skin every evening., Disp: 3 mL, Rfl: 5    lancets 30 gauge Misc, 100 lancets by Misc.(Non-Drug; Combo Route) route before meals as needed (3 times daily)., Disp: 100 each, Rfl: 3    pen needle, diabetic 31 gauge x 3/16" Ndle, Inject 1 each into the skin every evening., Disp: 50 each, Rfl: 5    metroNIDAZOLE (METROGEL) 0.75 % (37.5mg/5 gram) vaginal gel, Place 70 applicators vaginally once daily. (Patient not taking: Reported on 4/14/2025), Disp: , Rfl:     nitrofurantoin, macrocrystal-monohydrate, (MACROBID) 100 MG capsule, Take 100 mg by mouth 2 (two) times daily. (Patient not taking: Reported on 4/14/2025), Disp: , Rfl:     "

## 2025-04-14 NOTE — LETTER
April 14, 2025        Gayle Freeman MD  [address]  [place] [address]             [facility] - Maternal Fetal Medicine  [address] [facility]  [place] [address]  Phone: [phone]  Fax: [phone]   Patient: Myrtle Valdez   MR Number: [id number]   YOB: 1995   Date of Visit: 4/14/2025       Dear Dr. Freeman:    Thank you for referring Myrtle Valdez to me for evaluation. Below are the relevant portions of my assessment and plan of care.            If you have questions, please do not hesitate to call me. I look forward to following Myrtle along with you.    Sincerely,      Bi Goldsmith MD           CC  No Recipients

## 2025-05-11 PROBLEM — O24.913 DIABETES MELLITUS AFFECTING PREGNANCY IN THIRD TRIMESTER: Status: ACTIVE | Noted: 2025-05-11

## 2025-05-11 NOTE — ASSESSMENT & PLAN NOTE
Previously counseled, please reference prior consultations for full breadth of recommendations.    Insulin regimen:  AM: NPH 40 units   Pre breakfast: Regular 26 units   Pre dinner: Regular 16 units   PM: NPH 20 units    Log review today:  Fastin/7 above goal (range )  Postprandial:  Breakfast:  3/7  above goal (range )  Lunch: 2/7  above goal (range )  Dinner: 0/7 above goal (range )      Recommendations for Primary OB Management:  Baseline evaluation (to be ordered by primary OB provider):  24 hour urine protein or urine P/C ratio, CBC, CMP: CBC/CMP completed but I do not see a P/Cr ratio   Maternal EKG; echocardiogram if BMI > 30 or EKG is abnormal  Maternal ophthalmic exam (if hasn't been performed in last year) and podiatry exam  Hemoglobin A1C every trimester  Diabetic education referral and counseling re: goal blood sugars (< 95 mg/dl for fasting, < 120 mg/dl for 2 hour postprandial)  Patient is being managed by Endocrinology - Dr. Hammond  Continue to check blood sugars 4x daily  Fasting and 2-hour postprandial glucose monitoring.   Goals of fasting levels below 95 mg/dL and postprandial levels below 120 mg/dL.   Management per Endocrine (Dr. Hammond)  Medications:   Recommend  low dose aspirin 81 mg daily for preeclampsia risk reduction  1 mg folic acid daily  Insulin  Ultrasounds:  Serial growth ultrasounds about every  4-6 weeks at 26-28 weeks:  Fetal echocardiogram - completed, WNL 25  Prenatal testing  Twice weekly BPP/ NST + AFV starting at 32 weeks. (Should be ordered and arranged by the patient's primary OB provider).     Delivery timing: Ongoing discussion. Today we reviewed growth profile is on the smaller end. Anticipate delivery timing decision with next growth. Anticipate 37 0/7 - 38 0/7 for delivery timing.  38 0/7 to 38 and 6/7 weeks if under good control without comorbidities  37 0/7 to 37 and 6/7 weeks if longstanding diabetes or poorly controlled,  polyhydramnios, EFW>90th percentile, or BMI >= 40  36 0/7 to 37 and 6/7 weeks if vascular complications, prior stillbirth or other complicating conditions.  Recommend consideration of earlier delivery if IUGR, HTN, or other complications  Recommend offering  for delivery is EFW is 4500g or more near the time of delivery    Insulin management during labor and delivery - see original consult for details  Postpartum management - see original consult for details   0

## 2025-05-12 ENCOUNTER — PROCEDURE VISIT (OUTPATIENT)
Dept: MATERNAL FETAL MEDICINE | Facility: CLINIC | Age: 30
End: 2025-05-12
Payer: MEDICAID

## 2025-05-12 ENCOUNTER — OFFICE VISIT (OUTPATIENT)
Dept: MATERNAL FETAL MEDICINE | Facility: CLINIC | Age: 30
End: 2025-05-12
Payer: MEDICAID

## 2025-05-12 VITALS
BODY MASS INDEX: 39.54 KG/M2 | DIASTOLIC BLOOD PRESSURE: 81 MMHG | SYSTOLIC BLOOD PRESSURE: 129 MMHG | HEIGHT: 63 IN | WEIGHT: 223.13 LBS

## 2025-05-12 DIAGNOSIS — Z36.89 ENCOUNTER FOR ULTRASOUND TO ASSESS FETAL GROWTH: ICD-10-CM

## 2025-05-12 DIAGNOSIS — O24.913 DIABETES MELLITUS AFFECTING PREGNANCY IN THIRD TRIMESTER: Primary | ICD-10-CM

## 2025-05-12 PROCEDURE — 99999 PR PBB SHADOW E&M-EST. PATIENT-LVL III: CPT | Mod: PBBFAC,,, | Performed by: STUDENT IN AN ORGANIZED HEALTH CARE EDUCATION/TRAINING PROGRAM

## 2025-05-12 PROCEDURE — 1159F MED LIST DOCD IN RCRD: CPT | Mod: CPTII,,, | Performed by: STUDENT IN AN ORGANIZED HEALTH CARE EDUCATION/TRAINING PROGRAM

## 2025-05-12 PROCEDURE — 76816 OB US FOLLOW-UP PER FETUS: CPT | Mod: PBBFAC | Performed by: STUDENT IN AN ORGANIZED HEALTH CARE EDUCATION/TRAINING PROGRAM

## 2025-05-12 PROCEDURE — 3079F DIAST BP 80-89 MM HG: CPT | Mod: CPTII,,, | Performed by: STUDENT IN AN ORGANIZED HEALTH CARE EDUCATION/TRAINING PROGRAM

## 2025-05-12 PROCEDURE — 3074F SYST BP LT 130 MM HG: CPT | Mod: CPTII,,, | Performed by: STUDENT IN AN ORGANIZED HEALTH CARE EDUCATION/TRAINING PROGRAM

## 2025-05-12 PROCEDURE — 99213 OFFICE O/P EST LOW 20 MIN: CPT | Mod: PBBFAC,TH | Performed by: STUDENT IN AN ORGANIZED HEALTH CARE EDUCATION/TRAINING PROGRAM

## 2025-05-12 PROCEDURE — 3008F BODY MASS INDEX DOCD: CPT | Mod: CPTII,,, | Performed by: STUDENT IN AN ORGANIZED HEALTH CARE EDUCATION/TRAINING PROGRAM

## 2025-05-12 PROCEDURE — 99213 OFFICE O/P EST LOW 20 MIN: CPT | Mod: S$PBB,TH,, | Performed by: STUDENT IN AN ORGANIZED HEALTH CARE EDUCATION/TRAINING PROGRAM

## 2025-05-12 NOTE — PROGRESS NOTES
"Maternal Fetal Medicine follow up consult    SUBJECTIVE:     Myrtle Valdez is a 29 y.o.  female with IUP at 31w3d who is seen in follow up consultation by MFM.  Pregnancy complications include:   Problem   Diabetes Mellitus Affecting Pregnancy in Third Trimester       Previous notes reviewed.   No changes to medical, surgical, family, social, or obstetric history.    Interval history since last MFM visit: No complaints. Saw her endocrinologist last Tuesday with follow up planned for . No recent adjustments to her insulin regimen. Log reviewed. Denies decreased fetal movement, vaginal bleeding/spotting, loss of fluid or pain/contractions increasing in intensity and frequency. Currently scheduled for  .    Medications reviewed.    Care team members:  Primary OB - Gayle Freeman MD     OBJECTIVE:   /81 (BP Location: Left arm, Patient Position: Sitting)   Ht 5' 2.99" (1.6 m)   Wt 101.2 kg (223 lb 1.7 oz)   LMP 10/04/2024 (Exact Date)   BMI 39.53 kg/m²      Physical Exam   WNWD female appearing stated age, in NAD   No conversational dyspnea  Gravid abdomen    Ultrasound performed. See viewpoint for full ultrasound report.  [place] live IUP  Fetal size is appropriate for gestational age, with the EFW (1541 g) plotting at the 17% and the AC plotting at the 13%.   A limited repeat fetal anatomic survey appears normal.   The MVP is normal.  Presentation is cephalic     Significant labs/imaging:  Lab Results   Component Value Date    HGBA1C 6.7 (H) 2024       ASSESSMENT/PLAN:     29 y.o.  female with IUP at 31w3d    The following were addressed today.     Diabetes mellitus affecting pregnancy in third trimester  Previously counseled, please reference prior consultations for full breadth of recommendations.    Insulin regimen:  AM: NPH 40 units   Pre breakfast: Regular 26 units   Pre dinner: Regular 16 units   PM: NPH 20 units    Log review today:  Fastin/7 above goal " (range )  Postprandial:  Breakfast:  3/7  above goal (range )  Lunch: 2/7  above goal (range )  Dinner: 0/7 above goal (range )      Recommendations for Primary OB Management:  Baseline evaluation (to be ordered by primary OB provider):  24 hour urine protein or urine P/C ratio, CBC, CMP: CBC/CMP completed but I do not see a P/Cr ratio   Maternal EKG; echocardiogram if BMI > 30 or EKG is abnormal  Maternal ophthalmic exam (if hasn't been performed in last year) and podiatry exam  Hemoglobin A1C every trimester  Diabetic education referral and counseling re: goal blood sugars (< 95 mg/dl for fasting, < 120 mg/dl for 2 hour postprandial)  Patient is being managed by Endocrinology - Dr. Hammond  Continue to check blood sugars 4x daily  Fasting and 2-hour postprandial glucose monitoring.   Goals of fasting levels below 95 mg/dL and postprandial levels below 120 mg/dL.   Management per Endocrine (Dr. Hammond)  Medications:   Recommend  low dose aspirin 81 mg daily for preeclampsia risk reduction  1 mg folic acid daily  Insulin  Ultrasounds:  Serial growth ultrasounds about every  4-6 weeks at 26-28 weeks:  Fetal echocardiogram - completed, WNL 25  Prenatal testing  Twice weekly BPP/ NST + AFV starting at 32 weeks. (Should be ordered and arranged by the patient's primary OB provider).     Delivery timing: Ongoing discussion. Today we reviewed growth profile is on the smaller end. Anticipate delivery timing decision with next growth. Anticipate 37 0/7 - 38 0/7 for delivery timing.  38 0/7 to 38 and 6/7 weeks if under good control without comorbidities  37 0/7 to 37 and 6/7 weeks if longstanding diabetes or poorly controlled, polyhydramnios, EFW>90th percentile, or BMI >= 40  36 0/7 to 37 and 6/7 weeks if vascular complications, prior stillbirth or other complicating conditions.  Recommend consideration of earlier delivery if IUGR, HTN, or other complications  Recommend offering  for  delivery is EFW is 4500g or more near the time of delivery    Insulin management during labor and delivery - see original consult for details  Postpartum management - see original consult for details    Please see original MFM consultation for full details regarding management recommendations of these and other obstetric co-morbidities.    FOLLOW UP  F/u in 3 weeks for US  F/u in 3 weeks for [facility] visit    25 minutes of total time spent on the encounter, which includes face to face time and non-face to face time preparing to see the patient (eg, review of tests), obtaining and/or reviewing separately obtained history, documenting clinical information in the electronic or other health record, independently interpreting results (not separately reported) and communicating results to the patient/family/caregiver, or care coordination (not separately reported).    Marco Mcintosh MD  Maternal Fetal Medicine

## 2025-05-16 ENCOUNTER — PATIENT OUTREACH (OUTPATIENT)
Dept: ADMINISTRATIVE | Facility: HOSPITAL | Age: 30
End: 2025-05-16
Payer: MEDICAID

## 2025-06-06 ENCOUNTER — OFFICE VISIT (OUTPATIENT)
Dept: MATERNAL FETAL MEDICINE | Facility: CLINIC | Age: 30
End: 2025-06-06
Payer: MEDICAID

## 2025-06-06 ENCOUNTER — PROCEDURE VISIT (OUTPATIENT)
Dept: MATERNAL FETAL MEDICINE | Facility: CLINIC | Age: 30
End: 2025-06-06
Payer: MEDICAID

## 2025-06-06 VITALS — BODY MASS INDEX: 39.53 KG/M2 | DIASTOLIC BLOOD PRESSURE: 82 MMHG | SYSTOLIC BLOOD PRESSURE: 129 MMHG | HEIGHT: 63 IN

## 2025-06-06 DIAGNOSIS — Z36.89 ENCOUNTER FOR ULTRASOUND TO ASSESS FETAL GROWTH: ICD-10-CM

## 2025-06-06 DIAGNOSIS — O24.913 DIABETES MELLITUS AFFECTING PREGNANCY IN THIRD TRIMESTER: Primary | ICD-10-CM

## 2025-06-06 DIAGNOSIS — Z98.891 HISTORY OF CLASSICAL CESAREAN SECTION: ICD-10-CM

## 2025-06-06 PROCEDURE — 99213 OFFICE O/P EST LOW 20 MIN: CPT | Mod: PBBFAC,TH | Performed by: STUDENT IN AN ORGANIZED HEALTH CARE EDUCATION/TRAINING PROGRAM

## 2025-06-06 PROCEDURE — 76819 FETAL BIOPHYS PROFIL W/O NST: CPT | Mod: PBBFAC | Performed by: STUDENT IN AN ORGANIZED HEALTH CARE EDUCATION/TRAINING PROGRAM

## 2025-06-06 PROCEDURE — 99999 PR PBB SHADOW E&M-EST. PATIENT-LVL III: CPT | Mod: PBBFAC,,, | Performed by: STUDENT IN AN ORGANIZED HEALTH CARE EDUCATION/TRAINING PROGRAM

## 2025-06-06 PROCEDURE — 99213 OFFICE O/P EST LOW 20 MIN: CPT | Mod: PBBFAC,TH,25 | Performed by: STUDENT IN AN ORGANIZED HEALTH CARE EDUCATION/TRAINING PROGRAM

## 2025-06-06 PROCEDURE — 76816 OB US FOLLOW-UP PER FETUS: CPT | Mod: 26,S$PBB,, | Performed by: STUDENT IN AN ORGANIZED HEALTH CARE EDUCATION/TRAINING PROGRAM

## 2025-06-17 PROBLEM — Z98.891 STATUS POST CESAREAN SECTION: Status: RESOLVED | Noted: 2017-09-09 | Resolved: 2025-06-17

## 2025-06-17 PROBLEM — O24.012 PRE-EXISTING TYPE 1 DIABETES MELLITUS DURING PREGNANCY IN SECOND TRIMESTER: Status: RESOLVED | Noted: 2025-04-14 | Resolved: 2025-06-17

## 2025-06-17 PROBLEM — O14.10 SEVERE PREECLAMPSIA: Status: RESOLVED | Noted: 2017-08-22 | Resolved: 2025-06-17

## 2025-06-17 PROBLEM — Z3A.27 27 WEEKS GESTATION OF PREGNANCY: Status: RESOLVED | Noted: 2025-04-14 | Resolved: 2025-06-17

## 2025-06-17 PROBLEM — R73.01 ELEVATED FASTING BLOOD SUGAR: Status: RESOLVED | Noted: 2020-12-22 | Resolved: 2025-06-17

## 2025-06-17 PROBLEM — Z36.83 ENCOUNTER FOR SCREENING FOR CONGENITAL CARDIAC ABNORMALITIES IN FETUS: Status: RESOLVED | Noted: 2025-04-14 | Resolved: 2025-06-17

## 2025-06-17 PROBLEM — O24.113 TYPE 2 DIABETES MELLITUS AFFECTING PREGNANCY IN THIRD TRIMESTER, ANTEPARTUM: Status: ACTIVE | Noted: 2025-06-17

## 2025-06-18 DIAGNOSIS — E11.9 TYPE 2 DIABETES MELLITUS WITHOUT COMPLICATION: ICD-10-CM

## 2025-07-08 ENCOUNTER — PATIENT MESSAGE (OUTPATIENT)
Dept: MATERNAL FETAL MEDICINE | Facility: CLINIC | Age: 30
End: 2025-07-08
Payer: MEDICAID